# Patient Record
Sex: MALE | Race: BLACK OR AFRICAN AMERICAN | NOT HISPANIC OR LATINO | Employment: OTHER | URBAN - NONMETROPOLITAN AREA
[De-identification: names, ages, dates, MRNs, and addresses within clinical notes are randomized per-mention and may not be internally consistent; named-entity substitution may affect disease eponyms.]

---

## 2018-03-13 ENCOUNTER — OFFICE VISIT CONVERTED (OUTPATIENT)
Dept: FAMILY MEDICINE CLINIC | Age: 63
End: 2018-03-13
Attending: NURSE PRACTITIONER

## 2018-05-30 ENCOUNTER — OFFICE VISIT CONVERTED (OUTPATIENT)
Dept: FAMILY MEDICINE CLINIC | Age: 63
End: 2018-05-30
Attending: NURSE PRACTITIONER

## 2018-05-31 LAB
ALP SERPL-CCNC: 74 IU/L
ANA SER QL: NEGATIVE
ASO AB SERPL-ACNC: 73.5 IU/ML
CALCIUM SERPL-MCNC: 10.1 MG/DL
CRP SERPL-MCNC: 1.3 MG/L
PHOSPHATE SERPL-MCNC: 3.2 MG/DL
RA LATEX TURBID: <10 IU/ML
URATE SERPL-MCNC: 4.1 MG/DL

## 2018-06-12 ENCOUNTER — OFFICE VISIT CONVERTED (OUTPATIENT)
Dept: FAMILY MEDICINE CLINIC | Age: 63
End: 2018-06-12
Attending: NURSE PRACTITIONER

## 2018-06-19 ENCOUNTER — OFFICE VISIT CONVERTED (OUTPATIENT)
Dept: FAMILY MEDICINE CLINIC | Age: 63
End: 2018-06-19
Attending: NURSE PRACTITIONER

## 2018-09-04 ENCOUNTER — OFFICE VISIT CONVERTED (OUTPATIENT)
Dept: FAMILY MEDICINE CLINIC | Age: 63
End: 2018-09-04
Attending: NURSE PRACTITIONER

## 2018-12-14 ENCOUNTER — OFFICE VISIT (OUTPATIENT)
Dept: URBAN - METROPOLITAN AREA CLINIC 76 | Facility: CLINIC | Age: 63
End: 2018-12-14
Payer: COMMERCIAL

## 2018-12-14 PROCEDURE — 92020 GONIOSCOPY: CPT | Performed by: OPHTHALMOLOGY

## 2018-12-14 PROCEDURE — 99204 OFFICE O/P NEW MOD 45 MIN: CPT | Performed by: OPHTHALMOLOGY

## 2018-12-14 ASSESSMENT — INTRAOCULAR PRESSURE
OD: 10
OS: 12

## 2018-12-14 NOTE — IMPRESSION/PLAN
Impression: Open angle with borderline findings, high risk, bilateral: H40.023. OU. IOP good OU today. Reviewed previous records. Plan: Discussed condition. Continue Latanoprost QHS OU. Needs updated tests.

## 2019-02-06 ENCOUNTER — OFFICE VISIT (OUTPATIENT)
Dept: URBAN - METROPOLITAN AREA CLINIC 76 | Facility: CLINIC | Age: 64
End: 2019-02-06
Payer: COMMERCIAL

## 2019-02-06 PROCEDURE — 92083 EXTENDED VISUAL FIELD XM: CPT | Performed by: OPHTHALMOLOGY

## 2019-02-06 PROCEDURE — 76514 ECHO EXAM OF EYE THICKNESS: CPT | Performed by: OPHTHALMOLOGY

## 2019-02-06 PROCEDURE — 92133 CPTRZD OPH DX IMG PST SGM ON: CPT | Performed by: OPHTHALMOLOGY

## 2019-02-06 PROCEDURE — 99214 OFFICE O/P EST MOD 30 MIN: CPT | Performed by: OPHTHALMOLOGY

## 2019-02-06 RX ORDER — LATANOPROST 50 UG/ML
0.005 % SOLUTION OPHTHALMIC
Qty: 1 | Refills: 6 | Status: INACTIVE
Start: 2019-02-06 | End: 2019-10-21

## 2019-02-06 ASSESSMENT — INTRAOCULAR PRESSURE
OD: 15
OS: 16

## 2019-02-06 NOTE — IMPRESSION/PLAN
Impression: Open angle with borderline findings, high risk, bilateral: H40.023. OU. vs early OAG
IOP OU higher today, been off latanoprost QHS OU. 
VF and OCT reviewed today Target: Mid teens Plan: Discussed condition. Continue Latanoprost QHS OU. Advised medication compliance. 
will transfer care to Dr Theresa Singleton

## 2019-03-18 ENCOUNTER — HOSPITAL ENCOUNTER (OUTPATIENT)
Dept: OTHER | Facility: HOSPITAL | Age: 64
Discharge: HOME | End: 2019-03-18
Attending: NURSE PRACTITIONER

## 2019-03-18 ENCOUNTER — OFFICE VISIT CONVERTED (OUTPATIENT)
Dept: FAMILY MEDICINE CLINIC | Age: 64
End: 2019-03-18
Attending: NURSE PRACTITIONER

## 2019-03-18 LAB
ALBUMIN SERPL-MCNC: 4.3 G/DL (ref 3.5–5)
ALBUMIN/GLOB SERPL: 1.3 {RATIO} (ref 1.4–2.6)
ALP SERPL-CCNC: 69 U/L (ref 56–155)
ALT SERPL-CCNC: 20 U/L (ref 10–40)
ANION GAP SERPL CALC-SCNC: 21 MMOL/L (ref 8–19)
AST SERPL-CCNC: 20 U/L (ref 15–50)
BILIRUB SERPL-MCNC: 0.67 MG/DL (ref 0.2–1.3)
BUN SERPL-MCNC: 13 MG/DL (ref 5–25)
BUN/CREAT SERPL: 14 {RATIO} (ref 6–20)
CALCIUM SERPL-MCNC: 9.5 MG/DL (ref 8.7–10.4)
CHLORIDE SERPL-SCNC: 99 MMOL/L (ref 99–111)
CHOLEST SERPL-MCNC: 145 MG/DL (ref 107–200)
CHOLEST/HDLC SERPL: 2.7 {RATIO} (ref 3–6)
CONV CO2: 24 MMOL/L (ref 22–32)
CONV CREATININE URINE, RANDOM: 105.2 MG/DL (ref 10–300)
CONV MICROALBUM.,U,RANDOM: <12 MG/L (ref 0–20)
CONV TOTAL PROTEIN: 7.7 G/DL (ref 6.3–8.2)
CREAT UR-MCNC: 0.9 MG/DL (ref 0.7–1.2)
EST. AVERAGE GLUCOSE BLD GHB EST-MCNC: 171 MG/DL
GFR SERPLBLD BASED ON 1.73 SQ M-ARVRAT: >60 ML/MIN/{1.73_M2}
GLOBULIN UR ELPH-MCNC: 3.4 G/DL (ref 2–3.5)
GLUCOSE SERPL-MCNC: 179 MG/DL (ref 70–99)
HBA1C MFR BLD: 7.6 % (ref 3.5–5.7)
HDLC SERPL-MCNC: 54 MG/DL (ref 40–60)
LDLC SERPL CALC-MCNC: 62 MG/DL (ref 70–100)
MICROALBUMIN/CREAT UR: 11.4 MG/G{CRE} (ref 0–25)
OSMOLALITY SERPL CALC.SUM OF ELEC: 295 MOSM/KG (ref 273–304)
POTASSIUM SERPL-SCNC: 3.5 MMOL/L (ref 3.5–5.3)
PSA SERPL-MCNC: 0.46 NG/ML (ref 0–4)
SODIUM SERPL-SCNC: 140 MMOL/L (ref 135–147)
TRIGL SERPL-MCNC: 144 MG/DL (ref 40–150)
VLDLC SERPL-MCNC: 29 MG/DL (ref 5–37)

## 2019-04-13 ENCOUNTER — OFFICE VISIT CONVERTED (OUTPATIENT)
Dept: FAMILY MEDICINE CLINIC | Age: 64
End: 2019-04-13
Attending: FAMILY MEDICINE

## 2019-05-03 ENCOUNTER — HOSPITAL ENCOUNTER (OUTPATIENT)
Dept: OTHER | Facility: HOSPITAL | Age: 64
Discharge: HOME | End: 2019-05-03

## 2019-05-03 ENCOUNTER — OFFICE VISIT CONVERTED (OUTPATIENT)
Dept: FAMILY MEDICINE CLINIC | Age: 64
End: 2019-05-03
Attending: NURSE PRACTITIONER

## 2019-05-20 ENCOUNTER — OFFICE VISIT (OUTPATIENT)
Dept: URBAN - METROPOLITAN AREA CLINIC 71 | Facility: CLINIC | Age: 64
End: 2019-05-20
Payer: COMMERCIAL

## 2019-05-20 DIAGNOSIS — H02.051 TRICHIASIS WITHOUT ENTROPIAN RIGHT UPPER EYELID: ICD-10-CM

## 2019-05-20 PROCEDURE — 67820 REVISE EYELASHES: CPT | Performed by: OPTOMETRIST

## 2019-05-20 PROCEDURE — 99213 OFFICE O/P EST LOW 20 MIN: CPT | Performed by: OPTOMETRIST

## 2019-05-20 ASSESSMENT — INTRAOCULAR PRESSURE
OS: 11
OD: 12

## 2019-05-20 NOTE — IMPRESSION/PLAN
Impression: Open angle with borderline findings, high risk, bilateral: H40.023. OU. vs early OAG
IOP OU higher today, been off latanoprost QHS OU. Target: Mid teens Plan: Discussed condition. Continue Latanoprost QHS OU. Advised medication compliance.

## 2019-05-23 ENCOUNTER — OFFICE VISIT CONVERTED (OUTPATIENT)
Dept: FAMILY MEDICINE CLINIC | Age: 64
End: 2019-05-23
Attending: NURSE PRACTITIONER

## 2019-08-19 ENCOUNTER — HOSPITAL ENCOUNTER (OUTPATIENT)
Dept: OTHER | Facility: HOSPITAL | Age: 64
Discharge: HOME | End: 2019-08-19
Attending: NURSE PRACTITIONER

## 2019-08-19 ENCOUNTER — OFFICE VISIT CONVERTED (OUTPATIENT)
Dept: FAMILY MEDICINE CLINIC | Age: 64
End: 2019-08-19
Attending: NURSE PRACTITIONER

## 2019-08-19 ENCOUNTER — OFFICE VISIT (OUTPATIENT)
Dept: URBAN - METROPOLITAN AREA CLINIC 71 | Facility: CLINIC | Age: 64
End: 2019-08-19
Payer: COMMERCIAL

## 2019-08-19 DIAGNOSIS — H40.023 OPEN ANGLE WITH BORDERLINE FINDINGS, HIGH RISK, BILATERAL: Primary | ICD-10-CM

## 2019-08-19 LAB
ALBUMIN SERPL-MCNC: 4.5 G/DL (ref 3.5–5)
ALBUMIN/GLOB SERPL: 1.4 {RATIO} (ref 1.4–2.6)
ALP SERPL-CCNC: 73 U/L (ref 56–155)
ALT SERPL-CCNC: 18 U/L (ref 10–40)
ANION GAP SERPL CALC-SCNC: 20 MMOL/L (ref 8–19)
AST SERPL-CCNC: 17 U/L (ref 15–50)
BILIRUB SERPL-MCNC: 0.71 MG/DL (ref 0.2–1.3)
BUN SERPL-MCNC: 13 MG/DL (ref 5–25)
BUN/CREAT SERPL: 14 {RATIO} (ref 6–20)
CALCIUM SERPL-MCNC: 9.6 MG/DL (ref 8.7–10.4)
CHLORIDE SERPL-SCNC: 99 MMOL/L (ref 99–111)
CHOLEST SERPL-MCNC: 131 MG/DL (ref 107–200)
CHOLEST/HDLC SERPL: 2.5 {RATIO} (ref 3–6)
CONV CO2: 25 MMOL/L (ref 22–32)
CONV TOTAL PROTEIN: 7.7 G/DL (ref 6.3–8.2)
CREAT UR-MCNC: 0.93 MG/DL (ref 0.7–1.2)
EST. AVERAGE GLUCOSE BLD GHB EST-MCNC: 200 MG/DL
GFR SERPLBLD BASED ON 1.73 SQ M-ARVRAT: >60 ML/MIN/{1.73_M2}
GLOBULIN UR ELPH-MCNC: 3.2 G/DL (ref 2–3.5)
GLUCOSE SERPL-MCNC: 213 MG/DL (ref 70–99)
HBA1C MFR BLD: 8.6 % (ref 3.5–5.7)
HDLC SERPL-MCNC: 52 MG/DL (ref 40–60)
LDLC SERPL CALC-MCNC: 52 MG/DL (ref 70–100)
OSMOLALITY SERPL CALC.SUM OF ELEC: 296 MOSM/KG (ref 273–304)
POTASSIUM SERPL-SCNC: 3.9 MMOL/L (ref 3.5–5.3)
SODIUM SERPL-SCNC: 140 MMOL/L (ref 135–147)
TRIGL SERPL-MCNC: 137 MG/DL (ref 40–150)
VLDLC SERPL-MCNC: 27 MG/DL (ref 5–37)

## 2019-08-19 PROCEDURE — 99213 OFFICE O/P EST LOW 20 MIN: CPT | Performed by: OPTOMETRIST

## 2019-08-19 ASSESSMENT — INTRAOCULAR PRESSURE
OS: 12
OD: 13

## 2019-08-19 NOTE — IMPRESSION/PLAN
Impression: Open angle with borderline findings, high risk, bilateral: H40.023. OU. vs early OAG
IOP OU good today, been off latanoprost QHS OU. Target: Mid teens Plan: Discussed. Continue Latanoprost QHS OU. Advised medication compliance.  OCT/VF 2/2020

## 2019-09-10 ENCOUNTER — OFFICE VISIT CONVERTED (OUTPATIENT)
Dept: FAMILY MEDICINE CLINIC | Age: 64
End: 2019-09-10
Attending: NURSE PRACTITIONER

## 2019-11-22 ENCOUNTER — OFFICE VISIT (OUTPATIENT)
Dept: URBAN - METROPOLITAN AREA CLINIC 71 | Facility: CLINIC | Age: 64
End: 2019-11-22
Payer: COMMERCIAL

## 2019-11-22 DIAGNOSIS — H16.223 KERATOCONJUNCTIVITIS SICCA, NOT SPECIFIED AS SJÖGREN'S, BILATERAL: ICD-10-CM

## 2019-11-22 PROCEDURE — 99213 OFFICE O/P EST LOW 20 MIN: CPT | Performed by: OPTOMETRIST

## 2019-11-22 ASSESSMENT — INTRAOCULAR PRESSURE
OD: 12
OS: 13

## 2019-11-22 NOTE — IMPRESSION/PLAN
Impression: discussed. Open angle with borderline findings, high risk, bilateral: H40.023. OU. vs early OAG
IOP OU good today. Target: Mid teens Plan: Discussed. Continue Latanoprost QHS OU. Advised medication compliance.  OCT/VF 2/2020

## 2019-12-11 ENCOUNTER — OFFICE VISIT CONVERTED (OUTPATIENT)
Dept: FAMILY MEDICINE CLINIC | Age: 64
End: 2019-12-11
Attending: NURSE PRACTITIONER

## 2019-12-11 ENCOUNTER — HOSPITAL ENCOUNTER (OUTPATIENT)
Dept: OTHER | Facility: HOSPITAL | Age: 64
Discharge: HOME | End: 2019-12-11
Attending: NURSE PRACTITIONER

## 2019-12-11 LAB
ALBUMIN SERPL-MCNC: 4.6 G/DL (ref 3.5–5)
ALBUMIN/GLOB SERPL: 1.2 {RATIO} (ref 1.4–2.6)
ALP SERPL-CCNC: 63 U/L (ref 56–155)
ALT SERPL-CCNC: 13 U/L (ref 10–40)
ANION GAP SERPL CALC-SCNC: 18 MMOL/L (ref 8–19)
AST SERPL-CCNC: 20 U/L (ref 15–50)
BILIRUB SERPL-MCNC: 1.09 MG/DL (ref 0.2–1.3)
BUN SERPL-MCNC: 12 MG/DL (ref 5–25)
BUN/CREAT SERPL: 11 {RATIO} (ref 6–20)
CALCIUM SERPL-MCNC: 10.1 MG/DL (ref 8.7–10.4)
CHLORIDE SERPL-SCNC: 97 MMOL/L (ref 99–111)
CHOLEST SERPL-MCNC: 124 MG/DL (ref 107–200)
CHOLEST/HDLC SERPL: 3.2 {RATIO} (ref 3–6)
CONV CO2: 28 MMOL/L (ref 22–32)
CONV TOTAL PROTEIN: 8.3 G/DL (ref 6.3–8.2)
CREAT UR-MCNC: 1.14 MG/DL (ref 0.7–1.2)
EST. AVERAGE GLUCOSE BLD GHB EST-MCNC: 157 MG/DL
GFR SERPLBLD BASED ON 1.73 SQ M-ARVRAT: >60 ML/MIN/{1.73_M2}
GLOBULIN UR ELPH-MCNC: 3.7 G/DL (ref 2–3.5)
GLUCOSE SERPL-MCNC: 135 MG/DL (ref 70–99)
HBA1C MFR BLD: 7.1 % (ref 3.5–5.7)
HDLC SERPL-MCNC: 39 MG/DL (ref 40–60)
LDLC SERPL CALC-MCNC: 62 MG/DL (ref 70–100)
OSMOLALITY SERPL CALC.SUM OF ELEC: 292 MOSM/KG (ref 273–304)
POTASSIUM SERPL-SCNC: 3.2 MMOL/L (ref 3.5–5.3)
SODIUM SERPL-SCNC: 140 MMOL/L (ref 135–147)
TRIGL SERPL-MCNC: 116 MG/DL (ref 40–150)
VLDLC SERPL-MCNC: 23 MG/DL (ref 5–37)

## 2019-12-19 ENCOUNTER — HOSPITAL ENCOUNTER (OUTPATIENT)
Dept: OTHER | Facility: HOSPITAL | Age: 64
Discharge: HOME | End: 2019-12-19
Attending: NURSE PRACTITIONER

## 2019-12-19 LAB
ANION GAP SERPL CALC-SCNC: 16 MMOL/L (ref 8–19)
BUN SERPL-MCNC: 10 MG/DL (ref 5–25)
BUN/CREAT SERPL: 10 {RATIO} (ref 6–20)
CALCIUM SERPL-MCNC: 10.1 MG/DL (ref 8.7–10.4)
CHLORIDE SERPL-SCNC: 98 MMOL/L (ref 99–111)
CONV CO2: 30 MMOL/L (ref 22–32)
CREAT UR-MCNC: 0.97 MG/DL (ref 0.7–1.2)
GFR SERPLBLD BASED ON 1.73 SQ M-ARVRAT: >60 ML/MIN/{1.73_M2}
GLUCOSE SERPL-MCNC: 113 MG/DL (ref 70–99)
OSMOLALITY SERPL CALC.SUM OF ELEC: 292 MOSM/KG (ref 273–304)
POTASSIUM SERPL-SCNC: 3.3 MMOL/L (ref 3.5–5.3)
SODIUM SERPL-SCNC: 141 MMOL/L (ref 135–147)

## 2020-04-29 ENCOUNTER — OFFICE VISIT CONVERTED (OUTPATIENT)
Dept: FAMILY MEDICINE CLINIC | Age: 65
End: 2020-04-29
Attending: NURSE PRACTITIONER

## 2020-05-07 ENCOUNTER — HOSPITAL ENCOUNTER (OUTPATIENT)
Dept: OTHER | Facility: HOSPITAL | Age: 65
Discharge: HOME | End: 2020-05-07
Attending: NURSE PRACTITIONER

## 2020-05-07 LAB
ALBUMIN SERPL-MCNC: 4.3 G/DL (ref 3.5–5)
ALBUMIN/GLOB SERPL: 1.2 {RATIO} (ref 1.4–2.6)
ALP SERPL-CCNC: 63 U/L (ref 56–155)
ALT SERPL-CCNC: 14 U/L (ref 10–40)
ANION GAP SERPL CALC-SCNC: 17 MMOL/L (ref 8–19)
AST SERPL-CCNC: 16 U/L (ref 15–50)
BILIRUB SERPL-MCNC: 0.54 MG/DL (ref 0.2–1.3)
BUN SERPL-MCNC: 9 MG/DL (ref 5–25)
BUN/CREAT SERPL: 9 {RATIO} (ref 6–20)
CALCIUM SERPL-MCNC: 10.2 MG/DL (ref 8.7–10.4)
CHLORIDE SERPL-SCNC: 99 MMOL/L (ref 99–111)
CHOLEST SERPL-MCNC: 109 MG/DL (ref 107–200)
CHOLEST/HDLC SERPL: 2.2 {RATIO} (ref 3–6)
CONV CO2: 27 MMOL/L (ref 22–32)
CONV CREATININE URINE, RANDOM: 181.7 MG/DL (ref 10–300)
CONV MICROALBUM.,U,RANDOM: <12 MG/L (ref 0–20)
CONV TOTAL PROTEIN: 7.8 G/DL (ref 6.3–8.2)
CREAT UR-MCNC: 1.04 MG/DL (ref 0.7–1.2)
EST. AVERAGE GLUCOSE BLD GHB EST-MCNC: 151 MG/DL
GFR SERPLBLD BASED ON 1.73 SQ M-ARVRAT: >60 ML/MIN/{1.73_M2}
GLOBULIN UR ELPH-MCNC: 3.5 G/DL (ref 2–3.5)
GLUCOSE SERPL-MCNC: 138 MG/DL (ref 70–99)
HBA1C MFR BLD: 6.9 % (ref 3.5–5.7)
HDLC SERPL-MCNC: 49 MG/DL (ref 40–60)
LDLC SERPL CALC-MCNC: 44 MG/DL (ref 70–100)
MICROALBUMIN/CREAT UR: 6.6 MG/G{CRE} (ref 0–25)
OSMOLALITY SERPL CALC.SUM OF ELEC: 289 MOSM/KG (ref 273–304)
POTASSIUM SERPL-SCNC: 4.1 MMOL/L (ref 3.5–5.3)
SODIUM SERPL-SCNC: 139 MMOL/L (ref 135–147)
TRIGL SERPL-MCNC: 79 MG/DL (ref 40–150)
VLDLC SERPL-MCNC: 16 MG/DL (ref 5–37)

## 2020-11-10 ENCOUNTER — HOSPITAL ENCOUNTER (OUTPATIENT)
Dept: OTHER | Facility: HOSPITAL | Age: 65
Discharge: HOME | End: 2020-11-10
Attending: NURSE PRACTITIONER

## 2020-11-10 ENCOUNTER — OFFICE VISIT CONVERTED (OUTPATIENT)
Dept: FAMILY MEDICINE CLINIC | Age: 65
End: 2020-11-10
Attending: NURSE PRACTITIONER

## 2020-11-10 LAB
ALBUMIN SERPL-MCNC: 4.4 G/DL (ref 3.5–5)
ALBUMIN/GLOB SERPL: 1.5 {RATIO} (ref 1.4–2.6)
ALP SERPL-CCNC: 73 U/L (ref 56–155)
ALT SERPL-CCNC: 18 U/L (ref 10–40)
ANION GAP SERPL CALC-SCNC: 17 MMOL/L (ref 8–19)
AST SERPL-CCNC: 19 U/L (ref 15–50)
BILIRUB SERPL-MCNC: 0.73 MG/DL (ref 0.2–1.3)
BUN SERPL-MCNC: 14 MG/DL (ref 5–25)
BUN/CREAT SERPL: 13 {RATIO} (ref 6–20)
CALCIUM SERPL-MCNC: 10 MG/DL (ref 8.7–10.4)
CHLORIDE SERPL-SCNC: 99 MMOL/L (ref 99–111)
CHOLEST SERPL-MCNC: 127 MG/DL (ref 107–200)
CHOLEST/HDLC SERPL: 2.8 {RATIO} (ref 3–6)
CONV CO2: 26 MMOL/L (ref 22–32)
CONV TOTAL PROTEIN: 7.3 G/DL (ref 6.3–8.2)
CREAT UR-MCNC: 1.1 MG/DL (ref 0.7–1.2)
EST. AVERAGE GLUCOSE BLD GHB EST-MCNC: 275 MG/DL
GFR SERPLBLD BASED ON 1.73 SQ M-ARVRAT: >60 ML/MIN/{1.73_M2}
GLOBULIN UR ELPH-MCNC: 2.9 G/DL (ref 2–3.5)
GLUCOSE SERPL-MCNC: 235 MG/DL (ref 70–99)
HBA1C MFR BLD: 11.2 % (ref 3.5–5.7)
HDLC SERPL-MCNC: 45 MG/DL (ref 40–60)
LDLC SERPL CALC-MCNC: 52 MG/DL (ref 70–100)
OSMOLALITY SERPL CALC.SUM OF ELEC: 294 MOSM/KG (ref 273–304)
POTASSIUM SERPL-SCNC: 3.8 MMOL/L (ref 3.5–5.3)
SODIUM SERPL-SCNC: 138 MMOL/L (ref 135–147)
TRIGL SERPL-MCNC: 148 MG/DL (ref 40–150)
VLDLC SERPL-MCNC: 30 MG/DL (ref 5–37)

## 2021-02-01 ENCOUNTER — OFFICE VISIT (OUTPATIENT)
Dept: URBAN - METROPOLITAN AREA CLINIC 71 | Facility: CLINIC | Age: 66
End: 2021-02-01
Payer: COMMERCIAL

## 2021-02-01 DIAGNOSIS — H43.813 VITREOUS DEGENERATION, BILATERAL: ICD-10-CM

## 2021-02-01 PROCEDURE — 92014 COMPRE OPH EXAM EST PT 1/>: CPT | Performed by: OPTOMETRIST

## 2021-02-01 PROCEDURE — 92133 CPTRZD OPH DX IMG PST SGM ON: CPT | Performed by: OPTOMETRIST

## 2021-02-01 RX ORDER — LATANOPROST 50 UG/ML
0.005 % SOLUTION OPHTHALMIC
Qty: 2.5 | Refills: 2 | Status: INACTIVE
Start: 2021-02-01 | End: 2021-05-17

## 2021-02-01 ASSESSMENT — INTRAOCULAR PRESSURE
OS: 13
OD: 12

## 2021-02-01 NOTE — IMPRESSION/PLAN
Impression: discussed. Open angle with borderline findings, high risk, bilateral: H40.023. OU. vs early OAG. OCT 02/01/21: maybe worse superiorly OU. IOP OU good today. Target: Mid teens. Pt did not return for testing as advised (COVID) Plan: Discussed. Continue Latanoprost QHS OU. Next OCT due 02/2022. VF 24-2 next available.

## 2021-03-24 ENCOUNTER — HOSPITAL ENCOUNTER (OUTPATIENT)
Dept: OTHER | Facility: HOSPITAL | Age: 66
Discharge: HOME | End: 2021-03-24
Attending: NURSE PRACTITIONER

## 2021-03-24 ENCOUNTER — OFFICE VISIT CONVERTED (OUTPATIENT)
Dept: FAMILY MEDICINE CLINIC | Age: 66
End: 2021-03-24
Attending: NURSE PRACTITIONER

## 2021-03-24 LAB
ALBUMIN SERPL-MCNC: 4.4 G/DL (ref 3.5–5)
ALBUMIN/GLOB SERPL: 1.4 {RATIO} (ref 1.4–2.6)
ALP SERPL-CCNC: 73 U/L (ref 56–155)
ALT SERPL-CCNC: 22 U/L (ref 10–40)
ANION GAP SERPL CALC-SCNC: 22 MMOL/L (ref 8–19)
AST SERPL-CCNC: 19 U/L (ref 15–50)
BILIRUB SERPL-MCNC: 0.52 MG/DL (ref 0.2–1.3)
BUN SERPL-MCNC: 12 MG/DL (ref 5–25)
BUN/CREAT SERPL: 11 {RATIO} (ref 6–20)
CALCIUM SERPL-MCNC: 9.9 MG/DL (ref 8.7–10.4)
CHLORIDE SERPL-SCNC: 97 MMOL/L (ref 99–111)
CHOLEST SERPL-MCNC: 122 MG/DL (ref 107–200)
CHOLEST/HDLC SERPL: 3.1 {RATIO} (ref 3–6)
CONV CO2: 24 MMOL/L (ref 22–32)
CONV TOTAL PROTEIN: 7.5 G/DL (ref 6.3–8.2)
CREAT UR-MCNC: 1.06 MG/DL (ref 0.7–1.2)
EST. AVERAGE GLUCOSE BLD GHB EST-MCNC: 283 MG/DL
GFR SERPLBLD BASED ON 1.73 SQ M-ARVRAT: >60 ML/MIN/{1.73_M2}
GLOBULIN UR ELPH-MCNC: 3.1 G/DL (ref 2–3.5)
GLUCOSE SERPL-MCNC: 287 MG/DL (ref 70–99)
HBA1C MFR BLD: 11.5 % (ref 3.5–5.7)
HDLC SERPL-MCNC: 40 MG/DL (ref 40–60)
LDLC SERPL CALC-MCNC: 49 MG/DL (ref 70–100)
OSMOLALITY SERPL CALC.SUM OF ELEC: 298 MOSM/KG (ref 273–304)
POTASSIUM SERPL-SCNC: 4 MMOL/L (ref 3.5–5.3)
PSA SERPL-MCNC: 0.4 NG/ML (ref 0–4)
SODIUM SERPL-SCNC: 139 MMOL/L (ref 135–147)
TRIGL SERPL-MCNC: 163 MG/DL (ref 40–150)
VLDLC SERPL-MCNC: 33 MG/DL (ref 5–37)

## 2021-03-26 ENCOUNTER — OFFICE VISIT (OUTPATIENT)
Dept: URBAN - METROPOLITAN AREA CLINIC 71 | Facility: CLINIC | Age: 66
End: 2021-03-26
Payer: COMMERCIAL

## 2021-03-26 PROCEDURE — 99213 OFFICE O/P EST LOW 20 MIN: CPT | Performed by: OPTOMETRIST

## 2021-03-26 PROCEDURE — 92083 EXTENDED VISUAL FIELD XM: CPT | Performed by: OPTOMETRIST

## 2021-03-26 ASSESSMENT — INTRAOCULAR PRESSURE
OS: 11
OD: 18
OS: 17
OD: 13

## 2021-03-26 NOTE — IMPRESSION/PLAN
Impression: discussed. Open angle with borderline findings, high risk, bilateral: H40.023. OU. vs early OAG. OCT 02/01/21: maybe worse superiorly OU. VF 03/26/21: WNL/stable OU. IOP OU good today. Target: Mid teens. Plan: Discussed. Continue Latanoprost QHS OU. Next VF/OCT due 03/2022.

## 2021-04-02 ENCOUNTER — HOSPITAL ENCOUNTER (OUTPATIENT)
Dept: OTHER | Facility: HOSPITAL | Age: 66
Discharge: HOME | End: 2021-04-02
Attending: NURSE PRACTITIONER

## 2021-05-18 NOTE — PROGRESS NOTES
Maksim Acosta 1955     Office/Outpatient Visit    Visit Date: Tue, Jun 19, 2018 11:57 am    Provider: Yolanda Marie N.P. (Assistant: Velma Govea MA)    Location: [facility]        Electronically signed by Yolanda Marie N.P. on  06/19/2018 12:48:31 PM                             SUBJECTIVE:        CC:     Mr. Acosta is a 62 year old Black or  male.  Check up going to  and has a CT of his head Friday and derm appt on june 28         HPI:         Mr. Acosta presents with type 2 diabetes.  Current meds include an oral hypoglycemic ( Glucophage ( 1000mg bid ) and januvia ).  He reports home blood glucose readings have been fairly good, with average fasting glucoses running in the 120-150 mg/dL range.  Most recent lab results include HDL:  50 (mg/dL) (03/13/2018), Triglycerides:  62 (mg/dL) (11/13/2017),  162 (mg/dL) (03/13/2018), LDL:  50 (mg/dL) (03/13/2018), Hemoglobin A1c:  6.7 (%) (11/13/2017), Microalbuminuria:  6.6 (mg/g creat) (11/13/2017), Dilated Eye Exam by date:  02/23/2018 (03/13/2018), Foot Exam (Annual):  11/13/2017 (11/13/2017).          Concerning hyperlipidemia, current treatment includes Lipitor.  Compliance with treatment has been good; he takes his medication as directed.  He denies experiencing any hypercholesterolemia related symptoms.          Dx with hypertension; his current cardiac medication regimen includes a calcium channel blocker, an adrenergic inhibitor ( Catapres ), and a combination medication ( Zestoretic ).      ROS:     CONSTITUTIONAL:  Negative for chills, fatigue, fever, and weight change.      CARDIOVASCULAR:  Negative for chest pain, palpitations, tachycardia, orthopnea, and edema.      RESPIRATORY:  Negative for cough, dyspnea, and hemoptysis.      NEUROLOGICAL:  Negative for dizziness, headaches, paresthesias, and weakness.          PMH/FMH/SH:     Last Reviewed on 6/19/2018 12:08 PM by Yolanda Marie    Past Medical History:          Hyperlipidemia    Hypertension     Sleep Apnea: (+) sleep study; uses CPAP;     Chronic Pain: affecting the low back;     Type 2 Diabetes: dx'd in ;     Glaucoma: dx'd in ;         PAST MEDICAL HISTORY                 ADVANCED DIRECTIVES: None         PREVENTIVE HEALTH MAINTENANCE             COLORECTAL CANCER SCREENING:; 2014         Surgical History:         Procedures:    Colonoscopy ( 3-/Dany (has had polyps)due follow up in 2019 )         Family History:     Father:  at age 83;  Dementia;  Type 2 Diabetes     Mother:  at age 51; Cause of death was CVA     Brother(s): 3 brother(s) total; 2 ;  Myocardial Infarction (  71 );  cancer of pharynx     Sister(s): 2 sister(s) total; 2 ;   at birth         Social History:     Occupation: Disabled (due to low back pain)     Marital Status:      Children: 2 children         Tobacco/Alcohol/Supplements:     Last Reviewed on 2018 12:00 PM by eVlma Govea    Tobacco: He has never smoked.  Non-drinker             Immunizations:     Fluarix pf 9/10/2013     Fluarix pf 2015     Fluvirin (4 + years dose) 2016     Fluvirin (4 + years dose) 2017     Fluzone (3 + years dose) 9/10/2012     Fluzone pf (3+ years dose) 10/13/2011     PNEUMOVAX 23 (Pneumococcal PPV23) 10/13/2011     Zostavax (Zoster) 2015         Allergies:     Last Reviewed on 2018 12:00 PM by Velma Govea      No Known Drug Allergies.         Current Medications:     Last Reviewed on 2018 12:01 PM by Velma Govea    FreeStyle Lancets  Lancet Check BS 1time daily DX :E11.9     Glucose Reagent Blood Test Strips  Reagent Strips Freestyle Lite check BS QD     Trazodone HCl 100mg Tablet 1 tab HS     Amlodipine  10mg Tablet 1 tab daily     Clonidine HCl 0.2mg Tablets Take 1 tablet(s) by mouth bid     Januvia 100mg Tablet Take 1 tablet(s) by mouth daily     Lisinopril/Hydrochlorothiazide 20mg/12.5mg Tablet 1 bid      Metformin HCl 1,000mg Tablet One PO BID.     Potassium Chloride 10mEq Tablets, Extended Release Take 1 tablet(s) by mouth daily     Keflex 500mg Capsules Take 1 capsule 2 times daily x 10 days     Mobic 15mg Tablet 1 tab daily     Hydroxyzine HCl 25mg Tablet Take 1 tablet(s) by mouth tid PRN itching     Combigan 0.2%/0.5% Ophthalmic Solution 1 drop each eye BID     CPAP machine         OBJECTIVE:        Vitals:         Current: 6/19/2018 12:00:23 PM    Ht:  5 ft, 11 in;  Wt: 264 lbs;  BMI: 36.8    T: 97 F (oral);  BP: 139/97 mm Hg (right arm, sitting);  P: 81 bpm (right arm (BP Cuff), sitting);  sCr: 0.94 mg/dL;  GFR: 93.98        Repeat:     12:29:32 PM     BP:   136/88mm Hg (right arm, sitting)         Exams:     PHYSICAL EXAM:     GENERAL: Vitals recorded well developed, well nourished;  well groomed;  no apparent distress;     NECK: carotid exam reveals no bruits;     RESPIRATORY: normal respiratory rate and pattern with no distress; normal breath sounds with no rales, rhonchi, wheezes or rubs;     CARDIOVASCULAR: normal rate; rhythm is regular;  no systolic murmur; no edema;     PSYCHIATRIC:  appropriate affect and demeanor; normal speech pattern; grossly normal memory;         Procedures:     Vaccination against viral hepatitis     1. Hepatitis A (adult): 1.0 ml given IM in the left upper arm; administered by kh             ASSESSMENT           250.00   E11.9  Type 2 diabetes              DDx:     272.4   E78.2  Hyperlipidemia              DDx:     401.1   I10  Hypertension              DDx:     V05.3   Z23  Vaccination against viral hepatitis              DDx:         ORDERS:         Meds Prescribed:       Refill of: Januvia (Sitagliptin Phosphate) 100mg Tablet Take 1 tablet(s) by mouth daily  #90 (Ninety) tablet(s) Refills: 1       Refill of: Lisinopril/Hydrochlorothiazide 20mg/12.5mg Tablet 1 bid  #180 (One [place] and Eighty) tablet(s) Refills: 1       Refill of: Metformin HCl 1,000mg Tablet One PO BID.   #180 (One [place] and Eighty) tablet(s) Refills: 1       Refill of: Lipitor (Atorvastatin Calcium)  20mg Tablet 1 tab daily  #90 (Ninety) tablet(s) Refills: 1       Refill of: Amlodipine  10mg Tablet 1 tab daily  #90 (Ninety) tablet(s) Refills: 1       Refill of: Clonidine HCl 0.2mg Tablets Take 1 tablet(s) by mouth bid  #180 (One [place] and [place]y) tablet(s) Refills: 1       Refill of: Potassium Chloride 10mEq Tablets, Extended Release Take 1 tablet(s) by mouth daily  #90 (Ninety) tablet(s) Refills: 1         Lab Orders:       [id number]  DIAB - [facility] LIPID,CMP, A1C: 67371, 91313, 81447  (Send-Out)           Procedures Ordered:       [id number]  Hepatitis A vaccine, adult dosage, for intramuscular use  (In-House)         68250  Immunization administration; one vaccine  (In-House)                   PLAN:          Type 2 diabetes reviewed diabetes flow sheet, send for last eye exam done by Dr. Bowling     LABORATORY:  Labs ordered to be performed today include Diabetes Panel 1; CMP, Lipid, A1C.            Prescriptions:       Refill of: Januvia (Sitagliptin Phosphate) 100mg Tablet Take 1 tablet(s) by mouth daily  #90 (Ninety) tablet(s) Refills: 1       Refill of: Metformin HCl 1,000mg Tablet One PO BID.  #180 (One [place] and [place]y) tablet(s) Refills: 1           Orders:       [id number]  DIAB - [facility] LIPID,CMP, A1C: 01828, 63295, 32442  (Send-Out)            Hyperlipidemia           Prescriptions:       Refill of: Lipitor (Atorvastatin Calcium)  20mg Tablet 1 tab daily  #90 (Ninety) tablet(s) Refills: 1          Hypertension         RECOMMENDATIONS given include: perform routine monitoring of blood pressure with home blood pressure cuff and reduction of dietary salt intake.      FOLLOW-UP: Schedule a follow-up visit in 6 months.            Prescriptions:       Refill of: Lisinopril/Hydrochlorothiazide 20mg/12.5mg Tablet 1 bid  #180 (One [place] and [place]y) tablet(s) Refills: 1       Refill of: Amlodipine  10mg Tablet 1 tab daily   #90 (Ninety) tablet(s) Refills: 1       Refill of: Clonidine HCl 0.2mg Tablets Take 1 tablet(s) by mouth bid  #180 (One [place] and Eighty) tablet(s) Refills: 1       Refill of: Potassium Chloride 10mEq Tablets, Extended Release Take 1 tablet(s) by mouth daily  #90 (Ninety) tablet(s) Refills: 1          Vaccination against viral hepatitis             FOLLOW-UP: 6 months for second hep a vaccine           Orders:       [id number]  Hepatitis A vaccine, adult dosage, for intramuscular use  (In-House)         38841  Immunization administration; one vaccine  (In-House)             Patient Education Handouts:       Hepatitis A              Patient Recommendations:        For  Hypertension:     Begin monitoring your blood pressure by brief nurse visits at our office, a home blood pressure monitor, or by checking on the machines in pharmacies or stores.  Keep a log of the readings. Reduce the amount of salt in your diet.  Schedule a follow-up visit in 6 months.          For  Vaccination against viral hepatitis:                 FOLLOW-UP:             CHARGE CAPTURE           **Please note: ICD descriptions below are intended for billing purposes only and may not represent clinical diagnoses**        Primary Diagnosis:         250.00 Type 2 diabetes            E11.9    Type 2 diabetes mellitus without complications              Orders:          [id number]   Office/outpatient visit; established patient, level 4  (In-House)           272.4 Hyperlipidemia            E78.2    Mixed hyperlipidemia    401.1 Hypertension            I10    Essential (primary) hypertension    V05.3 Vaccination against viral hepatitis            Z23    Encounter for immunization              Orders:          [id number]   Hepatitis A vaccine, adult dosage, for intramuscular use  (In-House)             28824   Immunization administration; one vaccine  (In-House)

## 2021-05-18 NOTE — PROGRESS NOTES
Maksim Acosta 1955     Office/Outpatient Visit    Visit Date: Fri, May 3, 2019 01:58 pm    Provider: Obdulia Hoyos N.P. (Assistant: Melissa Cade MA)    Location: [facility]        Electronically signed by Obdulia Hoyos N.P. on  05/06/2019 06:59:58 AM                             SUBJECTIVE:        CC:     Mr. Acosta is a 63 year old Black or  male.  presents today due to complaints of cough and congestion X 1 month         HPI:         Patient complains of cough.  Pt states productive cough with brown musous. States blowing nose with clear mucous. Headaches, sore throat, feels he is coughing so much his lung hurt. Drinking water, using cough drops, robitussin with no relief.  He was seen 2 weeks ago with meds not helping at all.     ROS:     CONSTITUTIONAL:  Negative for chills, fatigue and fever.      CARDIOVASCULAR:  Negative for chest pain, orthopnea, paroxysmal nocturnal dyspnea and pedal edema.      RESPIRATORY:  Positive for recent cough.   Negative for dyspnea or cough.      GASTROINTESTINAL:  Negative for abdominal pain, heartburn, constipation, diarrhea, and stool changes.      MUSCULOSKELETAL:  Negative for arthralgias, back pain, and myalgias.      NEUROLOGICAL:  Negative for dizziness, headaches, paresthesias, and weakness.      PSYCHIATRIC:  Negative for anxiety and depression.          PMH/FMH/SH:     Last Reviewed on 5/03/2019 02:19 PM by Obdulia Hoyos    Past Medical History:         Hyperlipidemia    Hypertension     Sleep Apnea: (+) sleep study; uses CPAP;     Chronic Pain: affecting the low back;     Type 2 Diabetes: dx'd in 2010;     Glaucoma: dx'd in 2009;         PAST MEDICAL HISTORY                 ADVANCED DIRECTIVES: None         PREVENTIVE HEALTH MAINTENANCE             COLORECTAL CANCER SCREENING:; 03-, pt. has consultation scheduled in May     Hepatitis C Medicare Screening: was last done 5-16-17; negative     PSA: was last done  2018 with normal results         PAST MEDICAL HISTORY             CURRENT MEDICAL PROVIDERS:    Dentist: Dr. Jacobo    Ophthalmologist: Dr. Bowling    Urologist: Dr. Wiley         Surgical History:         Procedures:    Colonoscopy ( 3-/Dayn (has had polyps)due follow up in 2019 )         Family History:     Father:  at age 83;  Dementia;  Type 2 Diabetes     Mother:  at age 51; Cause of death was CVA     Brother(s): 3 brother(s) total; 2 ;  Myocardial Infarction (  71 );  cancer of pharynx     Sister(s): 2 sister(s) total; 2 ;   at birth     Daughter(s): 2 daughter(s) total         Social History:     Occupation: Disabled (due to low back pain)     Marital Status:      Children: 2 children         Tobacco/Alcohol/Supplements:     Last Reviewed on 2019 02:19 PM by Obdulia Hoyos    Tobacco: He has never smoked.  Non-drinker         Substance Abuse History:     Last Reviewed on 2019 02:19 PM by Obdulia Hoyos        Marijuana: Current use. couple times  a month         Mental Health History:     Last Reviewed on 2019 02:19 PM by Obdulia Hoyos        Communicable Diseases (eg STDs):     Last Reviewed on 2019 02:19 PM by Obdulia Hoyos            Current Problems:     Last Reviewed on 2019 02:19 PM by Obdulia Hoyos    History of colonic polyps     Joint pain, other specified site     Memory loss NOS     Psoriasis     Acanthosis nigricans     Low back pain     Hyperlipidemia     Hypertension     Type 2 diabetes     Cough     Tinea cruris     URI     Screening for prostate cancer     Screening for depression     Insomnia         Immunizations:     Havrix -adult dose (HepA) 2018     Hep A, adult dose 3/19/2019     Fluarix pf 9/10/2013     Fluarix pf 2015     Fluvirin (4 + years dose) 2016     Fluvirin (4 + years dose) 2017     Fluzone (3 + years dose) 9/10/2012     Fluzone (3 + years dose) 2018      Fluzone pf (3+ years dose) 10/13/2011     PNEUMOVAX 23 (Pneumococcal PPV23) 10/13/2011     Adacel (Tdap) 3/19/2019     Shingrix (Zoster recombinant) 3/19/2019     Zostavax (Zoster live) 9/19/2015         Allergies:     Last Reviewed on 5/03/2019 02:19 PM by Obdulia Hoyos      No Known Drug Allergies.         Current Medications:     Last Reviewed on 5/03/2019 02:19 PM by Obdulia Hoyos    Clonidine HCl 0.2mg Tablets Take 1 tablet(s) by mouth bid     Ventolin HFA 90mcg/1actuation Oral Inhaler Inhale 2 puff(s) by mouth 4 times a day as needed     Atorvastatin Calcium 20mg Tablet 1 tab daily     Trazodone HCl 100mg Tablet 1 tab HS     FreeStyle Lancets  Lancet Check BS 1time daily DX :E11.9     Glucose Reagent Blood Test Strips  Reagent Strips Freestyle Lite check BS QD     Amlodipine  10mg Tablet 1 tab daily     Januvia 100mg Tablet Take 1 tablet(s) by mouth daily     Lisinopril/Hydrochlorothiazide 20mg/12.5mg Tablet 1 bid     Metformin HCl 1,000mg Tablet One PO BID.     Potassium Chloride 10mEq Tablets, Extended Release Take 1 tablet(s) by mouth daily     Mobic 15mg Tablet 1 tab daily     Combigan 0.2%/0.5% Ophthalmic Solution 1 drop each eye BID     CPAP machine         OBJECTIVE:        Vitals:         Current: 5/3/2019 2:03:12 PM    Ht:  5 ft, 11 in;  Wt: 251.4 lbs;  BMI: 35.1    T: 98.2 F (oral);  BP: 120/79 mm Hg (right arm, sitting);  P: 90 bpm (right arm (BP Cuff), sitting);  sCr: 0.9 mg/dL;  GFR: 94.94        Exams:     PHYSICAL EXAM:     GENERAL: Vitals recorded well developed, well nourished;  well groomed;  no apparent distress;     EYES: lids and lacrimal system are normal in appearance; extraocular movements intact; conjunctiva and cornea are normal; PERRLA;     E/N/T:  normal EACs, TMs, nasal/oral mucosa, teeth, gingiva, and oropharynx;     NECK:  supple, full ROM; no thyromegaly; no carotid bruits;     RESPIRATORY: normal respiratory rate and pattern with no distress; decreased breath sounds  throughout;     CARDIOVASCULAR: normal rate; rhythm is regular;  normal S1; normal S2; no systolic murmur; no cyanosis; no edema;     GASTROINTESTINAL: nontender, nondistended; no hepatosplenomegaly or masses; no bruits;     SKIN:  no significant rashes or lesions; no suspicious moles;     MUSCULOSKELETAL:  Normal range of motion, strength and tone;     NEUROLOGICAL:  cranial nerves, motor and sensory function, reflexes, gait and coordination are all intact;     PSYCHIATRIC:  appropriate affect and demeanor; normal speech pattern; grossly normal memory;         ASSESSMENT:           466.0   J20.8  Acute bronchitis              DDx:         ORDERS:         Meds Prescribed:       Levaquin (Levofloxacin ) 500mg Tablet Take 1 tablet(s) by mouth daily for 10 days  #10 (Ten) tablet(s) Refills: 0       Prednisone 20mg Tablet 2 PO daily x 5 days  #10 (Ten) tablet(s) Refills: 0         Radiology/Test Orders:       [id number]  Radiologic exam chest 2 views  (Send-Out)                   PLAN:          Acute bronchitis Monitor BS as discussed.         RADIOLOGY:  I have ordered a chest x-ray (PA and lateral) to be done today.            Prescriptions:       Levaquin (Levofloxacin ) 500mg Tablet Take 1 tablet(s) by mouth daily for 10 days  #10 (Ten) tablet(s) Refills: 0       Prednisone 20mg Tablet 2 PO daily x 5 days  #10 (Ten) tablet(s) Refills: 0           Orders:       [id number]  Radiologic exam chest 2 views  (Send-Out)               CHARGE CAPTURE:           Primary Diagnosis:     466.0 Acute bronchitis            J20.8    Acute bronchitis due to other specified organisms              Orders:          [id number]   Office/outpatient visit; established patient, level 3  (In-House)

## 2021-05-18 NOTE — PROGRESS NOTES
Maksim Acosta 1955     Office/Outpatient Visit    Visit Date: Mon, Mar 18, 2019 08:36 am    Provider: Yolanda Marie N.P. (Assistant: Cathy Ramirez LPN)    Location: [facility]        Electronically signed by Yolanda Marie N.P. on  03/18/2019 10:10:52 AM                             SUBJECTIVE:        CC:     Mr. Acosta is a 63 year old Black or  male.  He is here for Medicare wellness.          HPI:         Mr. Acosta is here for a Medicare wellness visit.  ADVANCED DIRECTIVES: None     Returning to health checkup, the required HRA questions are integrated within this visit note. Family medical history and individual medical/surgical history were reviewed and updated.  A current height, weight, BMI, blood pressure, and pulse were recorded in the vitals section of the note and have been reviewed. Patient's medications, including supplements, were recorded in the chart and reviewed.  Current providers and suppliers were reviewed and updated.          Self-Assessment of Health: He rates his health as good. He rates his confidence of being able to control/manage most of his health problems as very confident. His physical/emotional health has limited his social activites not at all.  A review of cognitive impairment was performed, including ability to drive a car, manage finances, and any memory changes, and was found to be negative.  A review of functional ability, including bathing, dressing, walking, and urine/bowel continence as well as level of safety was performed and was found to be negative.  Falls Risk: Has not had any falls or only one fall without injury in the past year.  He denies having trouble hearing the TV/radio when others do not, having to strain to hear or understand conversations and wearing hearing aid(s).  Concerning home safety, he reports that at home he DOES have adequate lighting, grab bars in the bath, handrails on stairs and functioning smoke alarms,  but not a skid resistant shower/tub or absence of throw rugs.  Physical Activity: He exercises for at least 20 minutes 3 or more days/week.; Type of diet patient normally eats is described as well-balanced with fruits and vegetables Tobacco: He has never smoked.  Preventative Health updated today.          PHQ-9 Depression Screening: Completed form scanned and in chart; Total Score 6 Alcohol Consumption Screening: Completed form scanned and in chart; Total Score 0         Concerning type 2 diabetes, current meds include an oral hypoglycemic ( Glucophage ( 1000mg bid ) and januvia ).  He reports home blood glucose readings have averaged fasting readings in the 140-150's mg/dL range.  Most recent lab results include Hemoglobin A1c:  8.1 (09/28/2018), LDL:  58 (mg/dL) (06/19/2018), HDL:  48 (mg/dL) (06/19/2018), Triglycerides:  122 (mg/dL) (06/19/2018), Microalbuminuria:  6.6 (mg/g creat) (11/13/2017), Dilated Eye Exam by date:  02/23/2018 (03/13/2018), Foot Exam (Annual):  09/04/2018 (09/04/2018).      ROS:     CONSTITUTIONAL:  Negative for chills and fever.      EYES:  Negative for blurred vision.      E/N/T:  Negative for nasal congestion and sore throat.      CARDIOVASCULAR:  Negative for chest pain and palpitations.      RESPIRATORY:  Negative for recent cough and dyspnea.      GASTROINTESTINAL:  Negative for abdominal pain, nausea and vomiting.      MUSCULOSKELETAL:  Negative for myalgias.      INTEGUMENTARY:  Negative for atypical mole(s) and rash.      NEUROLOGICAL:  Positive for dizziness ( occ ).   Negative for paresthesias or weakness.      PSYCHIATRIC:  Negative for anxiety and depression.          PMH/FMH/SH:     Last Reviewed on 3/18/2019 10:04 AM by Yolanda Marie    Past Medical History:         Hyperlipidemia    Hypertension     Sleep Apnea: (+) sleep study; uses CPAP;     Chronic Pain: affecting the low back;     Type 2 Diabetes: dx'd in 2010;     Glaucoma: dx'd in 2009;         PAST MEDICAL  HISTORY                 ADVANCED DIRECTIVES: None         PREVENTIVE HEALTH MAINTENANCE             COLORECTAL CANCER SCREENING:; 2014, pt. has consultation scheduled in May     Hepatitis C Medicare Screening: was last done 17; negative     PSA: was last done 2018 with normal results         PAST MEDICAL HISTORY             CURRENT MEDICAL PROVIDERS:    Dentist: Dr. Jacobo    Ophthalmologist: Dr. Bowling    Urologist: Dr. Wiley         Surgical History:         Procedures:    Colonoscopy ( 3-/Saenz (has had polyps)due follow up in  )         Family History:     Father:  at age 83;  Dementia;  Type 2 Diabetes     Mother:  at age 51; Cause of death was CVA     Brother(s): 3 brother(s) total; 2 ;  Myocardial Infarction (  71 );  cancer of pharynx     Sister(s): 2 sister(s) total; 2 ;   at birth     Daughter(s): 2 daughter(s) total         Social History:     Occupation: Disabled (due to low back pain)     Marital Status:      Children: 2 children         Tobacco/Alcohol/Supplements:     Last Reviewed on 3/18/2019 08:48 AM by Cathy Ramirez    Tobacco: He has never smoked.  Non-drinker             Immunizations:     Havrix -adult dose (HepA) 2018     Fluarix pf 9/10/2013     Fluarix pf 2015     Fluvirin (4 + years dose) 2016     Fluvirin (4 + years dose) 2017     Fluzone (3 + years dose) 9/10/2012     Fluzone pf (3+ years dose) 10/13/2011     PNEUMOVAX 23 (Pneumococcal PPV23) 10/13/2011     Zostavax (Zoster live) 2015         Allergies:     Last Reviewed on 3/18/2019 08:48 AM by Cathy Ramirez      No Known Drug Allergies.         Current Medications:     Last Reviewed on 3/18/2019 08:51 AM by Cathy Ramirez    Trazodone HCl 100mg Tablet 1 tab HS     FreeStyle Lancets  Lancet Check BS 1time daily DX :E11.9     Glucose Reagent Blood Test Strips  Reagent Strips Freestyle Lite check BS QD     Amlodipine  10mg Tablet 1 tab daily     Clonidine  HCl 0.2mg Tablets Take 1 tablet(s) by mouth bid     Januvia 100mg Tablet Take 1 tablet(s) by mouth daily     Lisinopril/Hydrochlorothiazide 20mg/12.5mg Tablet 1 bid     Metformin HCl 1,000mg Tablet One PO BID.     Potassium Chloride 10mEq Tablets, Extended Release Take 1 tablet(s) by mouth daily     Mobic 15mg Tablet 1 tab daily     Atorvastatin Calcium 20mg Tablet 1 tab daily     Combigan 0.2%/0.5% Ophthalmic Solution 1 drop each eye BID     CPAP machine         OBJECTIVE:        Vitals:         Current: 3/18/2019 8:47:29 AM    Ht:  5 ft, 11 in;  Wt: 259.4 lbs;  BMI: 36.2    T: 97.8 F (oral);  BP: 142/95 mm Hg (right arm, sitting);  P: 89 bpm (right arm (BP Cuff), sitting);  sCr: 1.02 mg/dL;  GFR: 84.89    VA: 20/30 OD, 20/40 OS (near, with correction)        Repeat:     8:47:45 AM     BP:   147/98mm Hg (left arm, sitting)     9:38:55 AM     BP:   117/84mm Hg (left arm, sitting)     8:48:00 AM     P:   84bpm (left arm (BP Cuff), sitting)         Exams:     PHYSICAL EXAM:     GENERAL: Vitals recorded well developed, well nourished;  well groomed;  no apparent distress;     EYES: lids and lacrimal system are normal in appearance; extraocular movements intact; conjunctiva and cornea are normal; PERRLA;     E/N/T:  normal EACs, TMs, nasal/oral mucosa, teeth, gingiva, and oropharynx;     NECK:  supple, full ROM; no thyromegaly; no carotid bruits;     RESPIRATORY: normal respiratory rate and pattern with no distress; normal breath sounds with no rales, rhonchi, wheezes or rubs;     CARDIOVASCULAR: normal rate; rhythm is regular;  no systolic murmur; no edema;     GASTROINTESTINAL: nontender; normal bowel sounds; no organomegaly;     LYMPHATICS:  no adenopathy in cervical, supraclavicular, axillary, or inguinal regions;     SKIN: skin of feet intact;     MUSCULOSKELETAL:  Normal range of motion, strength and tone;     NEUROLOGIC: sensation intact to feet bilaterally; GROSSLY INTACT     PSYCHIATRIC:  appropriate affect and  demeanor; normal speech pattern; grossly normal memory;         ASSESSMENT           V70.0   Z00.01  Health checkup              DDx:     V79.0   Z13.89  Screening for depression              DDx:     250.00   E11.9  Type 2 diabetes              DDx:     V76.44   Z12.5  Screening for prostate cancer              DDx:     401.1   I10  Hypertension              DDx:     V12.72   Z86.010  History of colonic polyps              DDx:         ORDERS:         Lab Orders:       [id number]  DIAB2 - [facility] CMP A1C LIPID AND MICRO ALBUM CR RATIO: [id number]  (Send-Out)         *  PRSAS Medicare screening PSA  (Send-Out)           Procedures Ordered:         Annual wellness visit, includes a PPPS, subsequent visit  (In-House)           Other Orders:         Negative EtOH screen  (In-House)           Calculated BMI above the upper parameter and a follow-up plan was documented in the medical record  (In-House)                   PLAN:          Health checkup recommend adacel, new shingles and second hep a vaccines, prevnar 13 at 65     MIPS     BMI Elevated - Follow-Up Plan: He was provided education on weight loss strategies     COUNSELING was provided today regarding the following topics: healthy eating habits, low salt diet, regular exercise, use of seat belts, Advanced Directives information given, ADVISED TO SEE AN EYE DOCTOR AND A DENTIST REGULARLY, and Given Home Safety Handout.            Orders:         Calculated BMI above the upper parameter and a follow-up plan was documented in the medical record  (In-House)           Annual wellness visit, includes a PPPS, subsequent visit  (In-House)             Patient Education Handouts:       Physical Exam 60+ year, Male           Screening for depression     MIPS PHQ-9 Depression Screening Completed form scanned and in chart; Total Score 6 Negative alcohol screen           Orders:         Negative EtOH screen  (In-House)             Type 2 diabetes updated diabetes flow sheet, send for last eye exam     LABORATORY:  Labs ordered to be performed today include Diabetes Panel 2;CMP, A1C, Lipid, Microalbumin:Creatinine Ratio.            Orders:       [id number]  DIAB - [facility] CMP A1C LIPID AND MICRO ALBUM CR RATIO: [id number]  (Send-Out)            Screening for prostate cancer     LABORATORY:  Labs ordered to be performed today include PSA Screening Medicare patients.            Orders:       *  PRSAS Medicare screening PSA  (Send-Out)            Hypertension         RECOMMENDATIONS given include: perform routine monitoring of blood pressure with home blood pressure cuff and reduction of dietary salt intake.           History of colonic polyps keep appt in May for his follow up colon screening             Patient Recommendations:        For  Health checkup:     Limit dietary intake of fat (especially saturated fat) and cholesterol.  Eat a variety of foods, including plenty of fruits, vegetables, and grain containg fiber, limit fat intake to 30% of total calories. Balance caloric intake with energy expended. Maintaining regular physical activity is advised to help prevent heart disease, hypertension, diabetes, and obesity. Always use shoulder/lap restraints when driving or riding in a vehicle, even those equipped with air bags.          For  Hypertension:     Begin monitoring your blood pressure by brief nurse visits at our office, a home blood pressure monitor, or by checking on the machines in pharmacies or stores.  Keep a log of the readings. Reduce the amount of salt in your diet.              CHARGE CAPTURE           **Please note: ICD descriptions below are intended for billing purposes only and may not represent clinical diagnoses**        Primary Diagnosis:         V70.0 Health checkup            Z00.01    Encounter for general adult medical exam w abnormal findings              Orders:             Calculated BMI above  the upper parameter and a follow-up plan was documented in the medical record  (In-House)                Annual wellness visit, includes a PPPS, subsequent visit  (In-House)           V79.0 Screening for depression            Z13.89    Encounter for screening for other disorder              Orders:             Negative EtOH screen  (In-House)           250.00 Type 2 diabetes            E11.9    Type 2 diabetes mellitus without complications    V76.44 Screening for prostate cancer            Z12.5    Encounter for screening for malignant neoplasm of prostate    401.1 Hypertension            I10    Essential (primary) hypertension    V12.72 History of colonic polyps            Z86.010    Personal history of colonic polyps
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
## 2021-05-18 NOTE — PROGRESS NOTES
Maksim Acosta  1955     Office/Outpatient Visit    Visit Date: Tue, Nov 10, 2020 08:34 am    Provider: Yolanda Marie N.P. (Assistant: Alejandra Rodriguez RN)    Location: [facility]        Electronically signed by Yolanda Marie N.P. on  11/10/2020 10:33:39 AM                             Subjective:        CC: Mr. Acosta is a 65 year old Black or  male.  This is a follow-up visit.  Medication refills         HPI:           PHQ-9 Depression Screening: Completed form scanned and in chart; Total Score enter total score:  5           With regard to the type 2 diabetes mellitus without complications, current meds include an oral hypoglycemic ( Glucophage ( 1000mg bid ) and Januvia ( 100 mg QD ) ) and insulin/injectable ( Trulicity- 1.5 ).  He reports home blood glucose readings have averaged fasting readings in the 140-190 mg/dL range.  Most recent lab results include Hemoglobin A1c:  6.9 (%) (05/07/2020), HDL:  49 (mg/dL) (05/07/2020), Triglycerides:  79 (mg/dL) (05/07/2020), LDL:  44 (mg/dL) (05/07/2020), Weight (lb):  249.8 (05/07/2020), Microalbuminuria:  6.6 (mg/g creat) (05/07/2020), Dilated Eye Exam by date:  11/20/2019 (09/10/2019), Foot Exam (Annual):  03/18/2019 (03/18/2019).            Hyperlipidemia, unspecified details; current treatment includes Lipitor.  Compliance with treatment has been good; he takes his medication as directed.  He denies experiencing any hypercholesterolemia related symptoms.            Dx with insomnia, unspecified; better with Trazodone           In regard to the essential (primary) hypertension, his current cardiac medication regimen includes a calcium channel blocker ( Norvasc ), an adrenergic inhibitor ( Catapres ), and a combination medication ( Zestoretic ).  Review of his blood pressure log reveals systolics in the 117-138 and diastolics in the 76-91.  He is tolerating the medication well without side effects.  Compliance  with treatment has been good; he takes his medication as directed.            Additionally, he presents with history of low back pain.  he notes some pain relief with Mobic.      ROS:     CONSTITUTIONAL:  Negative for fever.  not eating healthy and not exercising     CARDIOVASCULAR:  Negative for chest pain, palpitations, tachycardia, orthopnea, and edema.      RESPIRATORY:  Negative for recent cough and dyspnea.      MUSCULOSKELETAL:  Positive for neck and knee pain.      NEUROLOGICAL:  Negative for dizziness, headaches, paresthesias, and weakness.          Past Medical History / Family History / Social History:         Last Reviewed on 11/10/2020 08:54 AM by Yolanda Marie    Past Medical History:         Hyperlipidemia    Hypertension     Sleep Apnea: (+) sleep study; uses CPAP;     Chronic Pain: affecting the low back;     Type 2 Diabetes: dx'd in ;     Glaucoma: dx'd in ;         PREVENTIVE HEALTH MAINTENANCE             COLORECTAL CANCER SCREENING: Up to date (colonoscopy q10y; sigmoidoscopy q5y; Cologuard q3y) was last done 19, Results are in chart; colonoscopy with the following abnormalities noted-- Diverticulosis; The next colonoscopy is due  ; 2014, pt. has consultation scheduled in May     EYE EXAM: Diabetic Eye Exam during this calendar year and results are in chart was last done 19     Hepatitis C Medicare Screening: was last done 17; negative     PSA: was last done 3/18/19 with normal results         PAST MEDICAL HISTORY             CURRENT MEDICAL PROVIDERS:    Dentist: Dr. Cabrera    Neurologist: Sushila Barclay    Ophthalmologist: Dr. Bowling    Pulmonologist: Dr. Fischer - [place]             ADVANCED DIRECTIVES: None         Surgical History:         Procedures:    Colonoscopy         Family History:     Father:  at age 83;  Dementia;  Type 2 Diabetes     Mother:  at age 51; Cause of death was CVA     Brother(s): 3 brother(s) total; 2 ;   Myocardial Infarction (  71 );  cancer of pharynx     Sister(s): 2 sister(s) total; 2 ;   at birth     Daughter(s): 2 daughter(s) total         Social History:     Occupation: Disabled (due to low back pain)     Marital Status:      Children: 2 children         Tobacco/Alcohol/Supplements:     Last Reviewed on 11/10/2020 08:34 AM by Alejandra Rodriguez    Tobacco: He has a past history of cigarette smoking; quit date:  .  Non-drinker         Immunizations:     influenza, trivalent, adjuvanted 2020    Havrix -adult dose (HepA) 2018    Hep A, adult dose 3/19/2019    Fluarix pf 9/10/2013    Fluarix pf 2015    Fluarix pf 2019    Fluvirin (4 + years dose) 2016    Fluvirin (4 + years dose) 2017    Fluzone (3 + years dose) 9/10/2012    Fluzone (3 + years dose) 2018    Fluzone pf (3+ years dose) 10/13/2011    PNEUMOVAX 23 (Pneumococcal PPV23) 10/13/2011    Adacel (Tdap) 3/19/2019    Shingrix (Zoster recombinant) 3/19/2019    Shingrix (Zoster recombinant) 2019    Zostavax (Zoster live) 2015        Allergies:     Last Reviewed on 11/10/2020 08:34 AM by Alejandra Rodriguez    No Known Allergies.        Current Medications:     Last Reviewed on 11/10/2020 08:55 AM by Yolanda Marie    CPAP machine     lisinopriL-hydrochlorothiazide 20-12.5 mg oral tablet [Take 1 tablet by mouth twice daily]    amLODIPine 10 mg oral tablet [Take 1 tablet by mouth once daily]    metFORMIN 1,000 mg oral tablet [One PO BID.]    traZODone 100 mg oral tablet [TAKE 1 TABLET BY MOUTH AT BEDTIME]    Januvia 100 mg oral tablet [Take 1 tablet by mouth once daily]    FreeStyle Lancets  Lancet [Check BS 1time daily DX :E11.9]    Combigan 0.2-0.5 % ophthalmic (eye) Drops [1 drop each eye BID]    Mobic 15 mg oral tablet [Take 1 tablet by mouth once daily with food]    cloNIDine HCl 0.2 mg oral tablet [Take 1 tablet by mouth twice daily]    aspirin 81 mg oral tablet, delayed release  (enteric coated) [Take 1 tablet by mouth once daily]    blood sugar diagnostic strips  [USE TO CHECK GLUCOSE ONCE OR TWICE DAILY  DX: E11.9]    potassium chloride 10 mEq oral tablet, extended release [Take 1 tablet by mouth twice daily]    atorvastatin 20 mg oral tablet [Take 1 tablet by mouth once daily]    multivitamin     Trulicity 1.5 mg/0.5 mL subcutaneous Pen Injector [INJECT 1.5MG (0.5ML) SUBCUTANEOUSLY ONCE WEEKLY]    lancets  [Check blood sugar one to two times daily. Dx: E11.9]        Objective:        Vitals:         Current: 11/10/2020 8:38:02 AM    Ht:  5 ft, 11 in;  Wt: 261.4 lbs;  BMI: 36.5T: 97.5 F (oral);  BP: 118/84 mm Hg (right arm, sitting);  P: 88 bpm (right arm (BP Cuff), sitting);  sCr: 1.04 mg/dL;  GFR: 81.43        Exams:     PHYSICAL EXAM:     GENERAL: Vitals recorded well developed, well nourished;  well groomed;  no apparent distress;     NECK: carotid exam reveals no bruits;     RESPIRATORY: normal respiratory rate and pattern with no distress; normal breath sounds with no rales, rhonchi, wheezes or rubs;     CARDIOVASCULAR: normal rate; rhythm is regular;  no systolic murmur;    Peripheral Pulses: posterior tibial: equal bilaterally;  trace pedal edema;     SKIN: skin of feet and toenails intact bilaterally;     NEUROLOGIC: sensation intact to monofilament bilaterally;     PSYCHIATRIC:  appropriate affect and demeanor; normal speech pattern; grossly normal memory;         Assessment:         Z13.31   Encounter for screening for depression       E11.9   Type 2 diabetes mellitus without complications       E78.5   Hyperlipidemia, unspecified       G47.00   Insomnia, unspecified       I10   Essential (primary) hypertension       M54.5   Low back pain           ORDERS:         Meds Prescribed:       [Refilled] traZODone 100 mg oral tablet [TAKE 1 TABLET BY MOUTH AT BEDTIME], #90 (ninety) each, Refills: 1 (one)       [Refilled] aspirin 81 mg oral tablet, delayed release (enteric coated)  [Take 1 tablet by mouth once daily], #90 (ninety) tablets, Refills: 1 (one)       [Refilled] cloNIDine HCl 0.2 mg oral tablet [Take 1 tablet by mouth twice daily], #180 (one hundred and eighty) tablets, Refills: 0 (zero)       [Refilled] lisinopriL-hydrochlorothiazide 20-12.5 mg oral tablet [Take 1 tablet by mouth twice daily], #180 (one hundred and eighty) tablets, Refills: 1 (one)         Lab Orders:       [id number]  DIAB1 - [facility] LIPID,CMP, A1C: 05957, 12119, [id number]  (Send-Out)              Other Orders:         Depression screen negative  (In-House)                      Plan:         Encounter for screening for depression    MIPS PHQ-9 Depression Screening: Completed form scanned and in chart; Total Score 0; Negative Depression Screen           Orders:         Depression screen negative  (In-House)              Type 2 diabetes mellitus without complicationsupdated diabetes flow sheet/ send for recent eye exam from [facility] /let me know when he needs his refills    LABORATORY:  Labs ordered to be performed today include Diabetes Panel 1; CMP, Lipid, A1C.      RECOMMENDATIONS: continue to monitor sugars, work on diet and exercise, take rx as directed.      FOLLOW-UP: pending labs           Orders:       [id number]  DIAB - [facility] LIPID,CMP, A1C: 47327, 10726, [id number]  (Send-Out)              Hyperlipidemia, unspecifiedcontinue statin         Insomnia, unspecified          Prescriptions:       [Refilled] traZODone 100 mg oral tablet [TAKE 1 TABLET BY MOUTH AT BEDTIME], #90 (ninety) each, Refills: 1 (one)         Essential (primary) hypertensionhe has not GI issues, takes his ASA low dose daily and wants to continue taking         RECOMMENDATIONS given include: perform routine monitoring of blood pressure with home blood pressure cuff.            Prescriptions:       [Refilled] aspirin 81 mg oral tablet, delayed release (enteric coated) [Take 1 tablet by mouth once daily], #90 (ninety) tablets, Refills: 1 (one)        [Refilled] cloNIDine HCl 0.2 mg oral tablet [Take 1 tablet by mouth twice daily], #180 (one hundred and eighty) tablets, Refills: 0 (zero)       [Refilled] lisinopriL-hydrochlorothiazide 20-12.5 mg oral tablet [Take 1 tablet by mouth twice daily], #180 (one hundred and eighty) tablets, Refills: 1 (one)         Low back paincontinue mobic             Patient Recommendations:        For  Essential (primary) hypertension:    Begin monitoring your blood pressure by brief nurse visits at our office, a home blood pressure monitor, or by checking on the machines in pharmacies or stores.  Keep a log of the readings.              Charge Capture:         Primary Diagnosis:     Z13.31  Encounter for screening for depression           Orders:      [id number]  Office/outpatient visit; established patient, level 4  (In-House)              Depression screen negative  (In-House)              E11.9  Type 2 diabetes mellitus without complications     E78.5  Hyperlipidemia, unspecified     G47.00  Insomnia, unspecified     I10  Essential (primary) hypertension     M54.5  Low back pain
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
## 2021-05-18 NOTE — PROGRESS NOTES
Maksim Acosta 1955     Office/Outpatient Visit    Visit Date: Mon, Aug 19, 2019 09:54 am    Provider: Yolanda Marie N.P. (Assistant: Sherri Stephen MA)    Location: [facility]        Electronically signed by Yolanda Marie N.P. on  08/19/2019 12:17:44 PM                             SUBJECTIVE:        CC:     Mr. Acosta is a 64 year old Black or  male.  This is a follow-up visit.  He had his flu and second hep a vaccine already this year at [facility].          HPI:         Patient to be evaluated for type 2 diabetes.  Current meds include an oral hypoglycemic ( Glucophage ( 1000mg bid ) and Januvia ).  He does not perform home blood glucose monitoring.  Most recent lab results include Hemoglobin A1c:  7.6 (%) (03/18/2019), HDL:  54 (mg/dL) (03/18/2019), Triglycerides:  144 (mg/dL) (03/18/2019), LDL:  62 (mg/dL) (03/18/2019), Systolic BP:  133 (08/19/2019), Diastolic BP:  92 (08/19/2019), Microalbuminuria:  11.4 (mg/g creat) (03/18/2019), Dilated Eye Exam by date:  03/01/2019 (03/18/2019), Foot Exam (Annual):  03/18/2019 (03/18/2019).          Hyperlipidemia details; current treatment includes Lipitor.  Compliance with treatment has been good; he takes his medication as directed.  He denies experiencing any hypercholesterolemia related symptoms.          Concerning hypertension, his current cardiac medication regimen includes a calcium channel blocker ( Norvasc ), an adrenergic inhibitor ( Catapres ), and a combination medication ( Zestoretic ).  He is tolerating the medication well without side effects.  Compliance with treatment has been good; he takes his medication as directed.          PHQ-9 Depression Screening: Completed form scanned and in chart; Total Score 8 Alcohol Consumption Screening: Completed form scanned and in chart; Total Score 0     ROS:     CONSTITUTIONAL:  Negative for chills, fatigue, fever, and weight change.      CARDIOVASCULAR:  Negative for  chest pain, palpitations, tachycardia, orthopnea, and edema.      RESPIRATORY:  Negative for cough, dyspnea, and hemoptysis.      MUSCULOSKELETAL:  Positive for takes mobic, does not need refills.      NEUROLOGICAL:  Negative for dizziness, headaches, paresthesias, and weakness.          PMH/FMH/SH:     Last Reviewed on 2019 10:16 AM by Yolanda Marie    Past Medical History:         Hyperlipidemia    Hypertension     Sleep Apnea: (+) sleep study; uses CPAP;     Chronic Pain: affecting the low back;     Type 2 Diabetes: dx'd in ;     Glaucoma: dx'd in ;         PAST MEDICAL HISTORY                 ADVANCED DIRECTIVES: None         PREVENTIVE HEALTH MAINTENANCE             COLORECTAL CANCER SCREENING: Up to date (colonoscopy q10y; sigmoidoscopy q5y; Cologuard q3y) was last done 19, Results are in chart; colonoscopy with the following abnormalities noted-- Diverticulosis; 2014, pt. has consultation scheduled in May     Hepatitis C Medicare Screening: was last done 17; negative     PSA: was last done 2018 with normal results         PAST MEDICAL HISTORY             CURRENT MEDICAL PROVIDERS:    Dentist: Dr. Jacobo    Ophthalmologist: Dr. Bowling    Urologist: Dr. Wiley         Surgical History:         Procedures:    Colonoscopy ( 3-/Dany (has had polyps)due follow up in  )         Family History:     Father:  at age 83;  Dementia;  Type 2 Diabetes     Mother:  at age 51; Cause of death was CVA     Brother(s): 3 brother(s) total; 2 ;  Myocardial Infarction (  71 );  cancer of pharynx     Sister(s): 2 sister(s) total; 2 ;   at birth     Daughter(s): 2 daughter(s) total         Social History:     Occupation: Disabled (due to low back pain)     Marital Status:      Children: 2 children         Tobacco/Alcohol/Supplements:     Last Reviewed on 2019 09:57 AM by Sherri Stephen    Tobacco: He has a past history of cigarette  smoking; quit date:  2012.  Non-drinker             Immunizations:     Havrix -adult dose (HepA) 6/19/2018     Hep A, adult dose 3/19/2019     Fluarix pf 9/10/2013     Fluarix pf 8/27/2015     Fluarix pf 8/8/2019     Fluvirin (4 + years dose) 9/6/2016     Fluvirin (4 + years dose) 8/29/2017     Fluzone (3 + years dose) 9/10/2012     Fluzone (3 + years dose) 8/17/2018     Fluzone pf (3+ years dose) 10/13/2011     PNEUMOVAX 23 (Pneumococcal PPV23) 10/13/2011     Adacel (Tdap) 3/19/2019     Shingrix (Zoster recombinant) 3/19/2019     Shingrix (Zoster recombinant) 8/9/2019     Zostavax (Zoster live) 9/19/2015         Allergies:     Last Reviewed on 8/19/2019 09:57 AM by Sherri Stephen      No Known Drug Allergies.         Current Medications:     Last Reviewed on 8/19/2019 09:58 AM by Sherri Stephen    Lisinopril/Hydrochlorothiazide 20mg/12.5mg Tablet 1 bid     Metformin HCl 1,000mg Tablet One PO BID.     Potassium Chloride 10mEq Tablets, Extended Release Take 1 tablet(s) by mouth daily     Atorvastatin Calcium 20mg Tablet 1 tab daily     Januvia 100mg Tablet Take 1 tablet(s) by mouth daily     Mobic 15mg Tablet 1 tab daily     Amlodipine  10mg Tablet 1 tab daily     Trazodone HCl 100mg Tablet 1 tab HS     FreeStyle Lancets  Lancet Check BS 1time daily DX :E11.9     Glucose Reagent Blood Test Strips  Reagent Strips Freestyle Lite check BS QD     Clonidine HCl 0.2mg Tablets Take 1 tablet(s) by mouth bid     Combigan 0.2%/0.5% Ophthalmic Solution 1 drop each eye BID     CPAP machine     multivitamin         OBJECTIVE:        Vitals:         Current: 8/19/2019 10:01:05 AM    Ht:  5 ft, 11 in;  Wt: 263.4 lbs;  BMI: 36.7    T: 97.8 F (oral);  BP: 134/92 mm Hg (left arm, sitting);  P: 86 bpm (left arm (BP Cuff), sitting);  sCr: 0.9 mg/dL;  GFR: 95.62        Repeat:     10:01:38 AM     BP:   133/92mm Hg (right arm, sitting)         Exams:     PHYSICAL EXAM:     GENERAL: Vitals recorded well developed, well nourished;   well groomed;  no apparent distress;     NECK: carotid exam reveals no bruits;     RESPIRATORY: normal respiratory rate and pattern with no distress; normal breath sounds with no rales, rhonchi, wheezes or rubs;     CARDIOVASCULAR: normal rate; rhythm is regular;  no systolic murmur; no edema;     PSYCHIATRIC:  appropriate affect and demeanor; normal speech pattern; grossly normal memory;         ASSESSMENT           250.00   E11.9  Type 2 diabetes              DDx:     272.4   E78.5  Hyperlipidemia              DDx:     401.1   I10  Hypertension              DDx:     V79.0   Z13.89  Screening for depression              DDx:         ORDERS:         Meds Prescribed:       Refill of: Lisinopril/Hydrochlorothiazide 20mg/12.5mg Tablet 1 bid  #180 (One [place] [address]) tablet(s) Refills: 1       Refill of: Metformin HCl 1,000mg Tablet One PO BID.  #180 (One [place] [address]) tablet(s) Refills: 1       Refill of: Potassium Chloride 10mEq Tablets, Extended Release Take 1 tablet(s) by mouth daily  #90 (Ninety) tablet(s) Refills: 1       Refill of: Atorvastatin Calcium 20mg Tablet 1 tab daily  #90 (Ninety) tablet(s) Refills: 1       Refill of: Januvia (Sitagliptin Phosphate) 100mg Tablet Take 1 tablet(s) by mouth daily  #90 (Ninety) tablet(s) Refills: 1       Refill of: Amlodipine  10mg Tablet 1 tab daily  #90 (Ninety) tablet(s) Refills: 1       Refill of: Clonidine HCl 0.2mg Tablets Take 1 tablet(s) by mouth bid  #180 (One [place] [address]) tablet(s) Refills: 1         Lab Orders:       [id number]  DIAB - [facility] LIPID,CMP, A1C: 74290, 05451, 40580  (Send-Out)           Other Orders:         Depression screen negative  (In-House)           Negative EtOH screen  (In-House)                   PLAN:          Type 2 diabetes needs a new meter, order written/ [facility] is his pharmacy     LABORATORY:  Labs ordered to be performed today include Diabetes Panel 1; CMP, Lipid, A1C.            Prescriptions:       Refill of:  Metformin HCl 1,000mg Tablet One PO BID.  #180 (One [place] [address]) tablet(s) Refills: 1       Refill of: Januvia (Sitagliptin Phosphate) 100mg Tablet Take 1 tablet(s) by mouth daily  #90 (Ninety) tablet(s) Refills: 1           Orders:       [id number]  [address] LIPID,CMP, A1C: 59087, 98853, 91035  (Send-Out)            Hyperlipidemia         RECOMMENDATIONS given include: exercise and low cholesterol/low fat diet.      FOLLOW-UP: pending lab results           Prescriptions:       Refill of: Atorvastatin Calcium 20mg Tablet 1 tab daily  #90 (Ninety) tablet(s) Refills: 1          Hypertension         RECOMMENDATIONS given include: reduction of dietary salt intake and Advised about free BP checks on the last Saturday of each month.            Prescriptions:       Refill of: Lisinopril/Hydrochlorothiazide 20mg/12.5mg Tablet 1 bid  #180 (One [place] [address]) tablet(s) Refills: 1       Refill of: Potassium Chloride 10mEq Tablets, Extended Release Take 1 tablet(s) by mouth daily  #90 (Ninety) tablet(s) Refills: 1       Refill of: Amlodipine  10mg Tablet 1 tab daily  #90 (Ninety) tablet(s) Refills: 1       Refill of: Clonidine HCl 0.2mg Tablets Take 1 tablet(s) by mouth bid  #180 (One [place] [address]) tablet(s) Refills: 1          Screening for depression     MIPS PHQ-9 Depression Screening: Completed form scanned and in chart; Total Score 8; Negative Depression Screen Negative alcohol screen           Orders:         Depression screen negative  (In-House)           Negative EtOH screen  (In-House)               Patient Recommendations:        For  Hyperlipidemia:     Maintain a regular exercise program. Reduce the amount of cholesterol and saturated fat in your diet.          For  Hypertension:     Reduce the amount of salt in your diet.              CHARGE CAPTURE           **Please note: ICD descriptions below are intended for billing purposes only and may not represent clinical diagnoses**         Primary Diagnosis:         250.00 Type 2 diabetes            E11.9    Type 2 diabetes mellitus without complications              Orders:          [id number]   Office/outpatient visit; established patient, level 4  (In-House)           272.4 Hyperlipidemia            E78.5    Hyperlipidemia, unspecified    401.1 Hypertension            I10    Essential (primary) hypertension    V79.0 Screening for depression            Z13.89    Encounter for screening for other disorder              Orders:             Depression screen negative  (In-House)                Negative EtOH screen  (In-House)

## 2021-05-18 NOTE — PROGRESS NOTES
Maksim Acosta  1955     Office/Outpatient Visit    Visit Date: Wed, Dec 11, 2019 10:18 am    Provider: Yolanda Marie N.P. (Assistant: Pamela Keller, )    Location: [facility]        Electronically signed by Yolanda Marie N.P. on  12/11/2019 12:51:11 PM                             Subjective:        CC: Mr. Acosta is a 64 year old Black or  male.  This is a follow-up visit.          HPI:           Mr. Acosta presents with type 2 diabetes mellitus without complications.  Current meds include an oral hypoglycemic ( Glucophage ( 1000mg bid ) and januvia ) and insulin/injectable ( trulicity ).  He reports home blood glucose readings have averaged fasting readings in the 130-150's mg/dL range.  Most recent lab results include Hemoglobin A1c:  8.6 (%) (08/19/2019), HDL:  52 (mg/dL) (08/19/2019), Triglycerides:  137 (mg/dL) (08/19/2019), LDL:  52 (mg/dL) (08/19/2019), Weight (lb):  252.2 (12/11/2019), Systolic BP:  125 (12/11/2019), Diastolic BP:  87 (12/11/2019), Microalbuminuria:  11.4 (mg/g creat) (03/18/2019), Dilated Eye Exam by date:  11/20/2019 (09/10/2019), Foot Exam (Annual):  03/18/2019 (03/18/2019).            Hyperlipidemia, unspecified details; current treatment includes Lipitor.  Compliance with treatment has been good; he takes his medication as directed.  He denies experiencing any hypercholesterolemia related symptoms.            In regard to the essential (primary) hypertension, his current cardiac medication regimen includes a calcium channel blocker ( Norvasc ), an adrenergic inhibitor ( Catapres ), and a combination medication ( Zestoretic ).  He is tolerating the medication well without side effects.  Compliance with treatment has been good; he takes his medication as directed.      ROS:     CONSTITUTIONAL:  Positive for intentional weight loss.      CARDIOVASCULAR:  Negative for chest pain, palpitations, tachycardia, orthopnea, and edema.       RESPIRATORY:  Negative for cough, dyspnea, and hemoptysis.      NEUROLOGICAL:  Negative for dizziness, headaches, paresthesias, and weakness.          Past Medical History / Family History / Social History:         Last Reviewed on 2019 12:47 PM by Yolanda Marie    Past Medical History:         Hyperlipidemia    Hypertension     Sleep Apnea: (+) sleep study; uses CPAP;     Chronic Pain: affecting the low back;     Type 2 Diabetes: dx'd in ;     Glaucoma: dx'd in ;         PREVENTIVE HEALTH MAINTENANCE             COLORECTAL CANCER SCREENING: Up to date (colonoscopy q10y; sigmoidoscopy q5y; Cologuard q3y) was last done 19, Results are in chart; colonoscopy with the following abnormalities noted-- Diverticulosis; 2014, pt. has consultation scheduled in May     EYE EXAM: Diabetic Eye Exam during this calendar year and results are in chart was last done 19     Hepatitis C Medicare Screening: was last done 17; negative     PSA: was last done 3/18/19 with normal results         PAST MEDICAL HISTORY             CURRENT MEDICAL PROVIDERS:    Dentist: Dr. Cabrera    Neurologist: Sushila Barclay    Ophthalmologist: Dr. Bowling             ADVANCED DIRECTIVES: None         Family History:     Father:  at age 83;  Dementia;  Type 2 Diabetes     Mother:  at age 51; Cause of death was CVA     Brother(s): 3 brother(s) total; 2 ;  Myocardial Infarction (  71 );  cancer of pharynx     Sister(s): 2 sister(s) total; 2 ;   at birth     Daughter(s): 2 daughter(s) total         Social History:     Occupation: Disabled (due to low back pain)     Marital Status:      Children: 2 children         Tobacco/Alcohol/Supplements:     Last Reviewed on 2019 10:21 AM by Pamela Keller    Tobacco: He has a past history of cigarette smoking; quit date:  .  Non-drinker         Immunizations:     Havrix -adult dose (HepA) 2018    Hep A, adult dose  3/19/2019    Fluarix pf 9/10/2013    Fluarix pf 8/27/2015    Fluarix pf 8/9/2019    Fluvirin (4 + years dose) 9/6/2016    Fluvirin (4 + years dose) 8/29/2017    Fluzone (3 + years dose) 9/10/2012    Fluzone (3 + years dose) 8/17/2018    Fluzone pf (3+ years dose) 10/13/2011    PNEUMOVAX 23 (Pneumococcal PPV23) 10/13/2011    Adacel (Tdap) 3/19/2019    Shingrix (Zoster recombinant) 3/19/2019    Shingrix (Zoster recombinant) 8/9/2019    Zostavax (Zoster live) 9/19/2015        Allergies:     Last Reviewed on 12/11/2019 10:21 AM by Pamela Keller    No Known Allergies.        Current Medications:     Last Reviewed on 12/11/2019 10:21 AM by Pamela Keller    CPAP machine     lisinopril-hydrochlorothiazide 20-12.5 mg oral tablet [1 bid]    metFORMIN 1,000 mg oral tablet [One PO BID.]    Januvia 100mg Tablet [Take 1 tablet(s) by mouth daily]    Amlodipine  10 mg oral tablet [1 tab daily]    traZODone 100 mg oral tablet [TAKE 1 TABLET BY MOUTH AT BEDTIME]    FreeStyle Lancets  Lancet [Check BS 1time daily DX :E11.9]    Combigan 0.2-0.5 % ophthalmic (eye) Drops [1 drop each eye BID]    Mobic 15mg Tablet [1 tab daily]    Clonidine HCl 0.2mg Tablets [Take 1 tablet(s) by mouth bid]    Accu-Chek Marisela Plus test strips  [USE TO CHECK GLUCOSE ONCE OR TWICE DAILY]    Potassium Chloride 10 mEq oral tablet, extended release [Take 1 tablet(s) by mouth daily]    atorvastatin 20 mg oral tablet [1 tab daily]    multivitamin     Trulicity 1.5 mg/0.5 mL subcutaneous Pen Injector [inject 0.5 milliliter (1.5 mg) by subcutaneous route every 7 days in the abdomen, thigh, or upper arm rotating injection sites]        Objective:        Vitals:         Current: 12/11/2019 10:23:23 AM    Ht:  5 ft, 11 in;  Wt: 252.2 lbs;  BMI: 35.2T: 97.8 F (oral);  BP: 125/87 mm Hg (left arm, sitting);  P: 105 bpm (left arm (BP Cuff), sitting);  sCr: 0.93 mg/dL;  GFR: 90.84        Exams:     PHYSICAL EXAM:     GENERAL: Vitals recorded well developed,  well nourished;  well groomed;  no apparent distress;     NECK: carotid exam reveals no bruits;     RESPIRATORY: normal respiratory rate and pattern with no distress; normal breath sounds with no rales, rhonchi, wheezes or rubs;     CARDIOVASCULAR: normal rate; rhythm is regular;  no systolic murmur; no edema;     PSYCHIATRIC:  appropriate affect and demeanor; normal speech pattern; grossly normal memory;         Assessment:         E11.9   Type 2 diabetes mellitus without complications       E78.5   Hyperlipidemia, unspecified       I10   Essential (primary) hypertension       780.93   Memory loss NOS   (Mild)     R41.89   Other symptoms and signs involving cognitive functions and awareness           ORDERS:         Meds Prescribed:       [Refilled] metFORMIN 1,000 mg oral tablet [One PO BID.], #180 (one hundred and eighty) tablets, Refills: 1 (one)         Lab Orders:       [id number]  DIAB - [facility] LIPID,CMP, A1C: 27664, 48766, 05813  (Send-Out)                      Plan:         Type 2 diabetes mellitus without complicationsreviewed his diabetes flow sheet, recheck labs today    LABORATORY:  Labs ordered to be performed today include Diabetes Panel 1; CMP, Lipid, A1C.      FOLLOW-UP: pending labs           Prescriptions:       [Refilled] metFORMIN 1,000 mg oral tablet [One PO BID.], #180 (one hundred and eighty) tablets, Refills: 1 (one)           Orders:       [id number]  DIAB - [facility] LIPID,CMP, A1C: 50104, 18481, 80482  (Send-Out)              Hyperlipidemia, unspecified        RECOMMENDATIONS given include: exercise, low cholesterol/low fat diet, and weight loss.          Essential (primary) hypertensioncontinue rx's         Other symptoms and signs involving cognitive functions and awarenessreviewed note from neurologist, due follow up in 9-2020            Patient Recommendations:        For  Hyperlipidemia, unspecified:    Maintain a regular exercise program. Reduce the amount of cholesterol and saturated fat in your  diet. Try to lose some weight; even modest weight reduction can improve your blood pressure.              Charge Capture:         Primary Diagnosis:     E11.9  Type 2 diabetes mellitus without complications           Orders:      [id number]  Office/outpatient visit; established patient, level 4  (In-House)              E78.5  Hyperlipidemia, unspecified     I10  Essential (primary) hypertension     780.93  Memory loss NOS     R41.89  Other symptoms and signs involving cognitive functions and awareness
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
## 2021-05-18 NOTE — PROGRESS NOTES
Maksim Acosta 1955     Office/Outpatient Visit    Visit Date: Sat, Apr 13, 2019 12:18 pm    Provider: Sonny Head MD (Assistant: Sarah Spurling, MA)    Location: [facility]        Electronically signed by Sonny Head MD on  04/13/2019 12:51:52 PM                             SUBJECTIVE:        CC:     Mr. Acosta is a 63 year old Black or  male.  cough for two weeks.          HPI:     little clear brown hear rattle at night Mr. Acosta in stating that he has had a cough for couple days with a little bit of clear brown sputum.  No fever or chills.  Does not feel short of breath.  He lives in [place] so is exposed to lots of different folks.  Says some people of had pneumonia recently.  He is not aware of any influenza.     The other issue is developed jock itch been present for quite a while he has been through several cans of Lotrimin spray which she says helps but then it comes right back.  He does have diabetes and the recent lab work has shown improvement in his hemoglobin A1c.  He is not on Jardiance or other SGL T-2 type medication.     ROS:     CONSTITUTIONAL:  Negative for chills, fatigue and fever.      CARDIOVASCULAR:  Negative for chest pain, orthopnea, paroxysmal nocturnal dyspnea and pedal edema.      RESPIRATORY:  Negative for dyspnea and hemoptysis.      GASTROINTESTINAL:  Negative for abdominal pain, heartburn, constipation, diarrhea, and stool changes.      GENITOURINARY:  Negative for dysuria and polyuria.      PSYCHIATRIC:  Negative for anxiety and depression.          PM/FM/SH:     Last Reviewed on 3/18/2019 10:04 AM by Yolanda Marie    Past Medical History:         Hyperlipidemia    Hypertension     Sleep Apnea: (+) sleep study; uses CPAP;     Chronic Pain: affecting the low back;     Type 2 Diabetes: dx'd in 2010;     Glaucoma: dx'd in 2009;         PAST MEDICAL HISTORY                 ADVANCED DIRECTIVES: None         PREVENTIVE HEALTH  MAINTENANCE             COLORECTAL CANCER SCREENING:; 2014, pt. has consultation scheduled in May     Hepatitis C Medicare Screening: was last done 17; negative     PSA: was last done 2018 with normal results         PAST MEDICAL HISTORY             CURRENT MEDICAL PROVIDERS:    Dentist: Dr. Jacobo    Ophthalmologist: Dr. Bowling    Urologist: Dr. Wiley         Surgical History:         Procedures:    Colonoscopy ( 3-/Dany (has had polyps)due follow up in  )         Family History:     Father:  at age 83;  Dementia;  Type 2 Diabetes     Mother:  at age 51; Cause of death was CVA     Brother(s): 3 brother(s) total; 2 ;  Myocardial Infarction (  71 );  cancer of pharynx     Sister(s): 2 sister(s) total; 2 ;   at birth     Daughter(s): 2 daughter(s) total         Social History:     Occupation: Disabled (due to low back pain)     Marital Status:      Children: 2 children         Tobacco/Alcohol/Supplements:     Last Reviewed on 3/18/2019 08:48 AM by Cathy Ramirez    Tobacco: He has never smoked.  Non-drinker         Substance Abuse History:     Last Reviewed on 2018 02:35 PM by Deloris Guerrier        Marijuana: Current use. couple times  a month         Mental Health History:     Last Reviewed on 2018 02:35 PM by Deloris Guerrier        Communicable Diseases (eg STDs):     Last Reviewed on 2018 02:35 PM by Deloris Guerrier            Allergies:     Last Reviewed on 3/18/2019 08:48 AM by Cathy Ramirez      No Known Drug Allergies.         Current Medications:     Last Reviewed on 3/18/2019 08:51 AM by Cathy Ramirez    Atorvastatin Calcium 20mg Tablet 1 tab daily     Trazodone HCl 100mg Tablet 1 tab HS     FreeStyle Lancets  Lancet Check BS 1time daily DX :E11.9     Glucose Reagent Blood Test Strips  Reagent Strips Freestyle Lite check BS QD     Amlodipine  10mg Tablet 1 tab daily     Clonidine HCl 0.2mg Tablets Take 1 tablet(s) by mouth  "bid     Januvia 100mg Tablet Take 1 tablet(s) by mouth daily     Lisinopril/Hydrochlorothiazide 20mg/12.5mg Tablet 1 bid     Metformin HCl 1,000mg Tablet One PO BID.     Potassium Chloride 10mEq Tablets, Extended Release Take 1 tablet(s) by mouth daily     Mobic 15mg Tablet 1 tab daily     Combigan 0.2%/0.5% Ophthalmic Solution 1 drop each eye BID     CPAP machine         OBJECTIVE:        Vitals:         Current: 4/13/2019 12:21:22 PM    Ht:  5 ft, 11 in;  Wt: 255 lbs;  BMI: 35.6    T: 97.8 F (oral);  BP: 135/90 mm Hg (right arm, sitting);  P: 102 bpm (right arm (BP Cuff), sitting);  sCr: 0.9 mg/dL;  GFR: 95.51        Exams:     PHYSICAL EXAM:     GENERAL:  well developed and nourished; appropriately groomed; in no apparent distress;     EYES: Nonicteric and with unremarkable lids, iris and pupils;     E/N/T:  normal EACs, TMs, nasal/oral mucosa, teeth, gingiva, and oropharynx; Small pharyngeal opening He says he does have sleep apnea     NECK: carotid exam reveals no bruits;     RESPIRATORY: normal respiratory rate and pattern with no distress; no rales (\"crackles\") present; expiratory wheezes in the End of expiration.  Mild.;     CARDIOVASCULAR: normal rate; rhythm is regular;  no systolic murmur;     LYMPHATIC: no enlargement of cervical or facial nodes;     MUSCULOSKELETAL: normal overall tone No pedal edema.;     NEUROLOGIC: No lateralizing deficit.;     PSYCHIATRIC:  appropriate affect and demeanor; normal speech pattern; grossly normal memory;         ASSESSMENT           465.8   J06.9  URI              DDx:     110.3   B35.6  Tinea cruris              DDx:     250.00   E11.9  Type 2 diabetes              DDx:         ORDERS:         Meds Prescribed:       Ventolin HFA (Albuterol) 90mcg/1actuation Oral Inhaler Inhale 2 puff(s) by mouth 4 times a day as needed  #1 (One) inhaler(s) Refills: 0       Cefuroxime Axetil 500mg Tablet Take 1 tablet(s) by mouth bid  #14 (Fourteen) tablet(s) Refills: 0       Diflucan " (Fluconazole) 100mg Tablet Take 1 tablet(s) by mouth daily  #14 (Fourteen) tablet(s) Refills: 0                 PLAN:          URI           Prescriptions:       Ventolin HFA (Albuterol) 90mcg/1actuation Oral Inhaler Inhale 2 puff(s) by mouth 4 times a day as needed  #1 (One) inhaler(s) Refills: 0       Cefuroxime Axetil 500mg Tablet Take 1 tablet(s) by mouth bid  #14 (Fourteen) tablet(s) Refills: 0          Tinea cruris Cautioned him about the drug interaction between Diflucan and atorvastatin           Prescriptions:       Diflucan (Fluconazole) 100mg Tablet Take 1 tablet(s) by mouth daily  #14 (Fourteen) tablet(s) Refills: 0             Patient Recommendations:    Dragon transcription disclaimer:        Much of this encounter note is an electronic transcription/translation of spoken language to printed text.  The electronic translation of spoken language may permit erroneous, or at times, nonsensical words or phrases to be inadvertently transcribed.  Although I have reviewed the note for such errors, some may still exist.             CHARGE CAPTURE           **Please note: ICD descriptions below are intended for billing purposes only and may not represent clinical diagnoses**        Primary Diagnosis:         465.8 URI            J06.9    Acute upper respiratory infection, unspecified              Orders:          52178   Office/outpatient visit; established patient, level 4  (In-House)           110.3 Tinea cruris            B35.6    Tinea cruris    250.00 Type 2 diabetes            E11.9    Type 2 diabetes mellitus without complications

## 2021-05-18 NOTE — PROGRESS NOTES
Maksim Acosta 1955     Office/Outpatient Visit    Visit Date: Tue, Sep 10, 2019 10:14 am    Provider: Yolanda Marie N.P. (Assistant: Sherri Stephen MA)    Location: [facility]        Electronically signed by Yolanda Marie N.P. on  09/18/2019 04:46:59 PM                             SUBJECTIVE:        CC:     Mr. Acosta is a 64 year old Black or  male.  This is a follow-up visit.          HPI:         Patient presents with type 2 diabetes.  Current meds include an oral hypoglycemic ( Glucophage ( 1000mg bid ) and Januvia ).  He reports home blood glucose readings have averaged fasting readings in the 189-274 mg/dL range.  Most recent lab results include Hemoglobin A1c:  8.6 (%) (08/19/2019), LDL:  52 (mg/dL) (08/19/2019), HDL:  52 (mg/dL) (08/19/2019), Triglycerides:  137 (mg/dL) (08/19/2019), Microalbuminuria:  11.4 (mg/g creat) (03/18/2019), Dilated Eye Exam by date:  08/30/2019 (08/19/2019), Foot Exam (Annual):  03/18/2019 (03/18/2019).      ROS:     CONSTITUTIONAL:  Positive for unintentional weight gain.      EYES:  Negative for blurred vision.      CARDIOVASCULAR:  Negative for chest pain, palpitations, tachycardia, orthopnea, and edema.      RESPIRATORY:  Positive for wheezing ( occ ).  has had bronchitis earlier this year, did smoke in the past     GENITOURINARY:  Positive for urinary frequency.   Negative for hematuria.      INTEGUMENTARY:  Positive for rash on his hands has flared.      NEUROLOGICAL:  Negative for dizziness, headaches, paresthesias, and weakness.          PMH/FMH/SH:     Last Reviewed on 9/10/2019 10:36 AM by Yolanda Marie    Past Medical History:         Hyperlipidemia    Hypertension     Sleep Apnea: (+) sleep study; uses CPAP;     Chronic Pain: affecting the low back;     Type 2 Diabetes: dx'd in 2010;     Glaucoma: dx'd in 2009;         PREVENTIVE HEALTH MAINTENANCE             COLORECTAL CANCER SCREENING: Up to date (colonoscopy q10y;  sigmoidoscopy q5y; Cologuard q3y) was last done 19, Results are in chart; colonoscopy with the following abnormalities noted-- Diverticulosis; 2014, pt. has consultation scheduled in May     EYE EXAM: Diabetic Eye Exam during this calendar year and results are in chart was last done 19     Hepatitis C Medicare Screening: was last done 17; negative     PSA: was last done 3/18/19 with normal results         PAST MEDICAL HISTORY             CURRENT MEDICAL PROVIDERS:    Dentist: Dr. Cabrera    Neurologist: Sushila Barclay    Ophthalmologist: Dr. Bowling             ADVANCED DIRECTIVES: None         Family History:     Father:  at age 83;  Dementia;  Type 2 Diabetes     Mother:  at age 51; Cause of death was CVA     Brother(s): 3 brother(s) total; 2 ;  Myocardial Infarction (  71 );  cancer of pharynx     Sister(s): 2 sister(s) total; 2 ;   at birth     Daughter(s): 2 daughter(s) total         Social History:     Occupation: Disabled (due to low back pain)     Marital Status:      Children: 2 children         Tobacco/Alcohol/Supplements:     Last Reviewed on 9/10/2019 10:17 AM by Sherri Stephen    Tobacco: He has a past history of cigarette smoking; quit date:  .  Non-drinker             Immunizations:     Havrix -adult dose (HepA) 2018     Hep A, adult dose 3/19/2019     Fluarix pf 9/10/2013     Fluarix pf 2015     Fluarix pf 2019     Fluvirin (4 + years dose) 2016     Fluvirin (4 + years dose) 2017     Fluzone (3 + years dose) 9/10/2012     Fluzone (3 + years dose) 2018     Fluzone pf (3+ years dose) 10/13/2011     PNEUMOVAX 23 (Pneumococcal PPV23) 10/13/2011     Adacel (Tdap) 3/19/2019     Shingrix (Zoster recombinant) 3/19/2019     Shingrix (Zoster recombinant) 2019     Zostavax (Zoster live) 2015         Allergies:     Last Reviewed on 9/10/2019 10:17 AM by Sherri Stephen      No Known Drug Allergies.          Current Medications:     Last Reviewed on 9/10/2019 10:18 AM by Sherri Stephen    Trazodone HCl 100mg Tablet 1 tab HS     Amlodipine  10mg Tablet 1 tab daily     Atorvastatin Calcium 20mg Tablet 1 tab daily     Clonidine HCl 0.2mg Tablets Take 1 tablet(s) by mouth bid     Januvia 100mg Tablet Take 1 tablet(s) by mouth daily     Lisinopril/Hydrochlorothiazide 20mg/12.5mg Tablet 1 bid     Metformin HCl 1,000mg Tablet One PO BID.     Potassium Chloride 10mEq Tablets, Extended Release Take 1 tablet(s) by mouth daily     Mobic 15mg Tablet 1 tab daily     FreeStyle Lancets  Lancet Check BS 1time daily DX :E11.9     Glucose Reagent Blood Test Strips  Reagent Strips Freestyle Lite check BS QD     multivitamin     Combigan 0.2%/0.5% Ophthalmic Solution 1 drop each eye BID     CPAP machine         OBJECTIVE:        Vitals:         Current: 9/10/2019 10:21:03 AM    Ht:  5 ft, 11 in;  Wt: 263 lbs;  BMI: 36.7    T: 97.4 F (oral);  BP: 131/89 mm Hg (left arm, sitting);  P: 97 bpm (left arm (BP Cuff), sitting);  sCr: 0.93 mg/dL;  GFR: 92.48        Exams:     PHYSICAL EXAM:     GENERAL: Vitals recorded well developed, well nourished;  well groomed;  no apparent distress;     NECK: carotid exam reveals no bruits;     RESPIRATORY: normal respiratory rate and pattern with no distress; inspiratory wheezes in the left mid-lung field and right mid-lung field; heard faintly     CARDIOVASCULAR: normal rate; rhythm is regular;  no systolic murmur; no edema;     SKIN: a rash is noted on the palms;  the color is mainly light;  it is best characterized as scaling;     PSYCHIATRIC:  appropriate affect and demeanor; normal speech pattern; grossly normal memory;         ASSESSMENT           250.02   E11.65  Type 2 diabetes, uncontrolled              DDx:     272.4   E78.5  Hyperlipidemia              DDx:     401.1   I10  Hypertension              DDx:     782.1   R21  Rash              DDx:     780.57   G47.33  JOYCE              DDx:      780.93   R41.89  Memory loss NOS              DDx:         ORDERS:         Meds Prescribed:       Refill of: Triamcinolone Acetonide (Triamcinolone Acetonide)  0.1% Topical Ointment Apply thin film to affected area bid  eczema  #1 (One) 80 gm tube Refills: 1       Refill of: Trulicity PEN (Dulaglutide) 0.75mg/0.5ml Injection inject once weekly x 4 weeks, then increase to 1.5mg dose weekly  #4 (Four) prefilled pen Refills: 0         Radiology/Test Orders:       2022F  Dilated retinal eye exam w/interpret by ophthalmologist/optometrist documented/reviewed (DM)4  (In-House)         3017F  Colorectal CA screen results documented and reviewed (PV)  (In-House)           Procedures Ordered:       REFER  Referral to Specialist or Other Facility  (Send-Out)           Other Orders:         Initiating visit for Comprehensive Care Management  (In-House)                   PLAN:          Type 2 diabetes, uncontrolled reviewed recent labs, vital signs, and glucose log, he brings in his new meter, accucheck, continue januvia and metformin, will add trulicity         RECOMMENDATIONS given include: Continue with chronic visit follow up.  Plan of care review today and extensive time spent with patient due to chronic conditions.  [facility]IPS Colorectal Cancer Screening is up to date and the results are in the chart   Diabetic Eye Exam during this calendar year and results are in chart     FOLLOW-UP: Schedule a follow-up visit in 3 months.            Prescriptions:       Refill of: Trulicity PEN (Dulaglutide) 0.75mg/0.5ml Injection inject once weekly x 4 weeks, then increase to 1.5mg dose weekly  #4 (Four) prefilled pen Refills: 0           Orders:         Initiating visit for Comprehensive Care Management  (In-House)         2022F  Dilated retinal eye exam w/interpret by ophthalmologist/optometrist documented/reviewed (DM)4  (In-House)         3017F  Colorectal CA screen results documented and reviewed (PV)  (In-House)             Hyperlipidemia on atorvastain / lipids at goal          Hypertension continue amolodipine, clonidine, ACE/HCTZ          Rash reviewed derm note from 6-2018, given triamcinalone cr, dg eczema, will send that rx in again, if not improving follow up, consider derm referral again if needed           Prescriptions:       Refill of: Triamcinolone Acetonide (Triamcinolone Acetonide)  0.1% Topical Ointment Apply thin film to affected area bid  eczema  #1 (One) 80 gm tube Refills: 1          JOYCE         REFERRALS:  Referral initiated to a pulmonologist ( for evaluation of JOYCE ).            Orders:       REFER  Referral to Specialist or Other Facility  (Send-Out)            Memory loss NOS sent for records from neurology             Patient Recommendations:        For  Type 2 diabetes, uncontrolled:     Schedule a follow-up visit in 3 months.              CHARGE CAPTURE           **Please note: ICD descriptions below are intended for billing purposes only and may not represent clinical diagnoses**        Primary Diagnosis:         250.02 Type 2 diabetes, uncontrolled            E11.65    Type 2 diabetes mellitus with hyperglycemia              Orders:          98704   Office/outpatient visit; established patient, level 4  (In-House)                Initiating visit for Comprehensive Care Management  (In-House)             2022F   Dilated retinal eye exam w/interpret by ophthalmologist/optometrist documented/reviewed (DM)4  (In-House)             3017F   Colorectal CA screen results documented and reviewed (PV)  (In-House)           272.4 Hyperlipidemia            E78.5    Hyperlipidemia, unspecified    401.1 Hypertension            I10    Essential (primary) hypertension    782.1 Rash            R21    Rash and other nonspecific skin eruption    780.57 JOYCE            G47.33    Obstructive sleep apnea (adult) (pediatric)    780.93 Memory loss NOS            R41.89    Other symptoms and signs involving cognitive functions  and awareness

## 2021-05-18 NOTE — PROGRESS NOTES
Maksim Acosta  1955     Office/Outpatient Visit    Visit Date: Wed, Mar 24, 2021 08:49 am    Provider: Yolanda Marie N.P. (Assistant: Dhara Lilly MA)    Location: [facility]        Electronically signed by Yolanda Marie N.P. on  03/24/2021 12:53:50 PM                             Subjective:        CC: Mr. Acosta is a 65 year old Black or  male.  This is a follow-up visit.  check up         HPI:           Patient presents with type 2 diabetes mellitus without complications.  Compliance with treatment has been good; he takes his medication as directed.  Current meds include an oral hypoglycemic ( Glucophage ( 1000mg bid ) and Januvia ( 100 mg QD ) ) and insulin/injectable ( Trulicity- 1.5 ).  He reports home blood glucose readings have showed poor control, with average fasting readings in the high-200s mg/dL range.  Most recent lab results include Hemoglobin A1c:  11.2 (%) (11/10/2020), LDL:  52 (mg/dL) (11/10/2020), HDL:  45 (mg/dL) (11/10/2020), Triglycerides:  148 (mg/dL) (11/10/2020), Microalbuminuria:  6.6 (mg/g creat) (05/07/2020), Dilated Eye Exam by date:  10/27/2020 (11/10/2020), Foot Exam (Annual):  11/10/2020 (11/10/2020).  Just realized that he was drinking concentrated orange juice which is high sugar. Has only stopped a few days ago.          Dx with hyperlipidemia, unspecified; current treatment includes Lipitor.  Compliance with treatment has been good; he takes his medication as directed.  He denies experiencing any hypercholesterolemia related symptoms.            Additionally, he presents with history of essential (primary) hypertension.  his current cardiac medication regimen includes a calcium channel blocker ( Norvasc ), an adrenergic inhibitor ( Catapres ), and a combination medication ( Zestoretic ).  He did not bring his blood pressure diary, but says that typical readings show systolics in the <120 and diastolics in the 80s.  He is  tolerating the medication well without side effects.  Compliance with treatment has been good; he takes his medication as directed.      ROS:     CONSTITUTIONAL:  Negative for fever.  has worked on diet some and exercise, lost over 10 pounds     CARDIOVASCULAR:  Negative for chest pain, palpitations, tachycardia, orthopnea, and edema.      RESPIRATORY:  Negative for recent cough and dyspnea.      GASTROINTESTINAL:  Negative for abdominal pain, heartburn, constipation, diarrhea, and stool changes.      MUSCULOSKELETAL:  Positive for arthralgias (takes mobic).  helps some     NEUROLOGICAL:  Positive for dizziness ( with positional changes ).  asked about being taken off clonidine     PSYCHIATRIC:  Positive for insomnia (takes trazodone).  off THC over a year         Past Medical History / Family History / Social History:         Last Reviewed on 3/24/2021 09:13 AM by Yolanda Marie    Past Medical History:         Hyperlipidemia    Hypertension     Sleep Apnea: (+) sleep study; uses CPAP;     Chronic Pain: affecting the low back;     Type 2 Diabetes: dx'd in 2010;     Glaucoma: dx'd in 2009;         PREVENTIVE HEALTH MAINTENANCE             COLORECTAL CANCER SCREENING: Up to date (colonoscopy q10y; sigmoidoscopy q5y; Cologuard q3y) was last done 7-29-19, Results are in chart; colonoscopy with the following abnormalities noted-- Diverticulosis; The next colonoscopy is due  2024; 03-, pt. has consultation scheduled in May     EYE EXAM: Diabetic Eye Exam during this calendar year and results are in chart was last done 8/30/19     Hepatitis C Medicare Screening: was last done 5-16-17; negative     PSA: was last done 3/18/19 with normal results         PAST MEDICAL HISTORY             CURRENT MEDICAL PROVIDERS:    Dentist: Dr. Cabrera    Neurologist: Sushila Barclay    Ophthalmologist: Dr. Bowling    Pulmonologist: Dr. Fischer - [place]             ADVANCED DIRECTIVES: None         Surgical History:          Procedures:    Colonoscopy         Family History:     Father:  at age 83;  Dementia;  Type 2 Diabetes     Mother:  at age 51; Cause of death was CVA     Brother(s): 3 brother(s) total; 2 ;  Myocardial Infarction (  71 );  cancer of pharynx     Sister(s): 2 sister(s) total; 2 ;   at birth     Daughter(s): 2 daughter(s) total         Social History:     Occupation: Disabled (due to low back pain)     Marital Status:      Children: 2 children         Tobacco/Alcohol/Supplements:     Last Reviewed on 11/10/2020 08:34 AM by Alejandra Rodriguez    Tobacco: He has a past history of cigarette smoking; quit date:  .  Non-drinker         Immunizations:     COVID-19, mRNA, LNP-S, PF, 30 mcg/0.3 mL dose 3/2/2021    influenza, trivalent, adjuvanted 2020    Havrix -adult dose (HepA) 2018    Hep A, adult dose 3/19/2019    Fluarix pf 9/10/2013    Fluarix pf 2015    Fluarix pf 2019    Fluvirin (4 + years dose) 2016    Fluvirin (4 + years dose) 2017    Fluzone (3 + years dose) 9/10/2012    Fluzone (3 + years dose) 2018    Fluzone pf (3+ years dose) 10/13/2011    PNEUMOVAX 23 (Pneumococcal PPV23) 10/13/2011    Adacel (Tdap) 3/19/2019    Shingrix (Zoster recombinant) 3/19/2019    Shingrix (Zoster recombinant) 2019    Zostavax (Zoster live) 2015    SARS-COV-2 (COVID-19) vaccine, UNSPECIFIED 2021    SARS-COV-2 (COVID-19) vaccine, UNSPECIFIED 3/15/2021        Allergies:     Last Reviewed on 3/24/2021 08:53 AM by Dhara Lilly    No Known Allergies.        Current Medications:     Last Reviewed on 3/24/2021 08:54 AM by Dhara Lilyl    CPAP machine     traZODone 100 mg oral tablet [TAKE 1 TABLET BY MOUTH AT BEDTIME]    lisinopriL-hydrochlorothiazide 20-12.5 mg oral tablet [Take 1 tablet by mouth twice daily]    amLODIPine 10 mg oral tablet [Take 1 tablet by mouth once daily]    Januvia 100 mg oral tablet [Take 1 tablet by mouth once  daily]    metFORMIN 1,000 mg oral tablet [Take 1 tablet by mouth twice daily]    FreeStyle Lancets  Lancet [Check BS 1time daily DX :E11.9]    Combigan 0.2-0.5 % ophthalmic (eye) Drops [1 drop each eye BID]    Mobic 15 mg oral tablet [TAKE 1 TABLET BY MOUTH ONCE DAILY WITH FOOD (NEED  APPOINTMENT)]    cloNIDine HCl 0.2 mg oral tablet [Take 1 tablet by mouth twice daily]    aspirin 81 mg oral tablet, delayed release (enteric coated) [Take 1 tablet by mouth once daily]    blood sugar diagnostic strips  [USE TO CHECK GLUCOSE ONCE OR TWICE DAILY  DX: E11.9]    potassium chloride 10 mEq oral tablet, extended release [Take 1 tablet by mouth twice daily]    atorvastatin 20 mg oral tablet [Take 1 tablet by mouth once daily]    multivitamin     Trulicity 1.5 mg/0.5 mL subcutaneous Pen Injector [INJECT 1.5MG (0.5ML) SUBCUTANEOUSLY ONCE WEEKLY]    lancets  [Check blood sugar one to two times daily. Dx: E11.9]        Objective:        Vitals:         Current: 3/24/2021 8:55:36 AM    Ht:  5 ft, 11 in;  Wt: 247 lbs;  BMI: 34.4T: 97.5 F (temporal);  BP: 138/95 mm Hg (right arm, sitting);  P: 100 bpm (right arm (BP Cuff), sitting);  sCr: 1.1 mg/dL;  GFR: 75.16        Repeat:     9:21:25 AM  BP:   131/90mm Hg (right arm, sitting, HR: 100)     Exams:     PHYSICAL EXAM:     GENERAL: Vitals recorded well developed, well nourished;  well groomed;  no apparent distress;     NECK: carotid exam reveals no bruits;     RESPIRATORY: normal respiratory rate and pattern with no distress; normal breath sounds with no rales, rhonchi, wheezes or rubs;     CARDIOVASCULAR: normal rate; rhythm is regular;  no systolic murmur; no edema;     NEUROLOGIC: GROSSLY INTACT     PSYCHIATRIC:  appropriate affect and demeanor; normal speech pattern; grossly normal memory;         Assessment:         E11.9   Type 2 diabetes mellitus without complications       E78.5   Hyperlipidemia, unspecified       I10   Essential (primary) hypertension       G47.00    Insomnia, unspecified       M54.5   Low back pain       Z12.5   Encounter for screening for malignant neoplasm of prostate           ORDERS:         Lab Orders:       [id number]  DIAB1 - H LIPID,CMP, A1C: 37642, 49683, 47063  (Send-Out)            *  PRSAS Medicare screening PSA  (Send-Out)                      Plan:         Type 2 diabetes mellitus without complicationsdoes not need refills today, reviewed diabetes flow sheet    LABORATORY:  Labs ordered to be performed today include Diabetes Panel 1; CMP, Lipid, A1C.      RECOMMENDATIONS: continue home monitoring, sugars are too high, to make changes in diet, continue regular exercise.      FOLLOW-UP: pending labs           Orders:       [id number]  DIAB1 - [facility] LIPID,CMP, A1C: 88238, 96269, 76377  (Send-Out)              Hyperlipidemia, unspecifiedcontinue lipitor         RECOMMENDATIONS given include: exercise, low cholesterol/low fat diet, and weight loss.          Essential (primary) hypertensionmay make a change in BP rx, clonidine after labs         RECOMMENDATIONS given include: perform routine monitoring of blood pressure with home blood pressure cuff and reduction of dietary salt intake.          Insomnia, unspecifiedcontinue trazodone         Low back paincontinue mobic         Encounter for screening for malignant neoplasm of prostatelast PSA normal 3-2019    LABORATORY:  Labs ordered to be performed today include PSA Screening Medicare patients.            Orders:       *  PRSAS Medicare screening PSA  (Send-Out)                  Patient Recommendations:        For  Hyperlipidemia, unspecified:    Maintain a regular exercise program. Reduce the amount of cholesterol and saturated fat in your diet. Try to lose some weight; even modest weight reduction can improve your blood pressure.          For  Essential (primary) hypertension:    Begin monitoring your blood pressure by brief nurse visits at our office, a home blood pressure monitor, or by checking  on the machines in pharmacies or stores.  Keep a log of the readings. Reduce the amount of salt in your diet.              Charge Capture:         Primary Diagnosis:     E11.9  Type 2 diabetes mellitus without complications           Orders:      [id number]  Office/outpatient visit; established patient, level 4  (In-House)              E78.5  Hyperlipidemia, unspecified     I10  Essential (primary) hypertension     G47.00  Insomnia, unspecified     M54.5  Low back pain     Z12.5  Encounter for screening for malignant neoplasm of prostate

## 2021-05-18 NOTE — PROGRESS NOTES
Maksim Acosta 1955     Office/Outpatient Visit    Visit Date: Thu, May 23, 2019 01:49 pm    Provider: Coby Cardona N.P. (Assistant: Melissa Cade MA)    Location: [facility]        Electronically signed by Coby Cardona N.P. on  05/23/2019 02:41:33 PM                             SUBJECTIVE:        CC:     Mr. Acosta is a 63 year old Black or  male.  presents today due to complaints of cough and wheezing X 1 month         HPI:         Patient to be evaluated for upper respiratory illness.  These have been present for the past 4 weeks.  The symptoms include chest congestion, productive cough, wheezing and mild shortness of breath.  He has already tried to relieve the symptoms with Ceftin, Levaquin, Prednisone.  Chest xray 5/3/19 was normal.      ROS:     CONSTITUTIONAL:  Negative for chills and fever.      EYES:  Negative for blurred vision and eye drainage.      E/N/T:  Negative for ear pain and sore throat.      CARDIOVASCULAR:  Negative for chest pain and palpitations.      RESPIRATORY:  Positive for recent cough ( with scant amounts of clear or white sputum ), dyspnea ( mild ) and frequent wheezing.          PMH/FMH/SH:     Last Reviewed on 5/03/2019 02:19 PM by Obdulia Hoyos    Past Medical History:         Hyperlipidemia    Hypertension     Sleep Apnea: (+) sleep study; uses CPAP;     Chronic Pain: affecting the low back;     Type 2 Diabetes: dx'd in 2010;     Glaucoma: dx'd in 2009;         PAST MEDICAL HISTORY                 ADVANCED DIRECTIVES: None         PREVENTIVE HEALTH MAINTENANCE             COLORECTAL CANCER SCREENING:; 03-, pt. has consultation scheduled in May     Hepatitis C Medicare Screening: was last done 5-16-17; negative     PSA: was last done 03/13/2018 with normal results         PAST MEDICAL HISTORY             CURRENT MEDICAL PROVIDERS:    Dentist: Dr. Jacobo    Ophthalmologist: Dr. Bowling    Urologist: Dr. Wiley         Surgical  History:         Procedures:    Colonoscopy ( 3-/Dany (has had polyps)due follow up in 2019 )         Family History:     Father:  at age 83;  Dementia;  Type 2 Diabetes     Mother:  at age 51; Cause of death was CVA     Brother(s): 3 brother(s) total; 2 ;  Myocardial Infarction (  71 );  cancer of pharynx     Sister(s): 2 sister(s) total; 2 ;   at birth     Daughter(s): 2 daughter(s) total         Social History:     Occupation: Disabled (due to low back pain)     Marital Status:      Children: 2 children         Tobacco/Alcohol/Supplements:     Last Reviewed on 2019 02:19 PM by Obdulia Hoyos    Tobacco: He has never smoked.  Non-drinker         Substance Abuse History:     Last Reviewed on 2019 02:19 PM by Obdulia Hoyos        Marijuana: Current use. couple times  a month         Mental Health History:     Last Reviewed on 2019 02:19 PM by Obdulia Hoyos        Communicable Diseases (eg STDs):     Last Reviewed on 2019 02:19 PM by Obdulia Hoyos            Current Problems:     Last Reviewed on 2019 02:19 PM by Obdulia Hoyos    History of colonic polyps     Joint pain, other specified site     Memory loss NOS     Psoriasis     Acanthosis nigricans     Low back pain     Hyperlipidemia     Hypertension     Type 2 diabetes     Acute bronchitis     Tinea cruris     URI     Insomnia         Immunizations:     Havrix -adult dose (HepA) 2018     Hep A, adult dose 3/19/2019     Fluarix pf 9/10/2013     Fluarix pf 2015     Fluvirin (4 + years dose) 2016     Fluvirin (4 + years dose) 2017     Fluzone (3 + years dose) 9/10/2012     Fluzone (3 + years dose) 2018     Fluzone pf (3+ years dose) 10/13/2011     PNEUMOVAX 23 (Pneumococcal PPV23) 10/13/2011     Adacel (Tdap) 3/19/2019     Shingrix (Zoster recombinant) 3/19/2019     Zostavax (Zoster live) 2015         Allergies:     Last Reviewed on 2019  02:19 PM by Obdulia Hoyos      No Known Drug Allergies.         Current Medications:     Last Reviewed on 5/03/2019 02:19 PM by Obdulia Hoyos    FreeStyle Lancets  Lancet Check BS 1time daily DX :E11.9     Glucose Reagent Blood Test Strips  Reagent Strips Freestyle Lite check BS QD     Clonidine HCl 0.2mg Tablets Take 1 tablet(s) by mouth bid     Ventolin HFA 90mcg/1actuation Oral Inhaler Inhale 2 puff(s) by mouth 4 times a day as needed     Atorvastatin Calcium 20mg Tablet 1 tab daily     Trazodone HCl 100mg Tablet 1 tab HS     Amlodipine  10mg Tablet 1 tab daily     Januvia 100mg Tablet Take 1 tablet(s) by mouth daily     Lisinopril/Hydrochlorothiazide 20mg/12.5mg Tablet 1 bid     Metformin HCl 1,000mg Tablet One PO BID.     Potassium Chloride 10mEq Tablets, Extended Release Take 1 tablet(s) by mouth daily     Prednisone 20mg Tablet 2 PO daily x 5 days     Mobic 15mg Tablet 1 tab daily     Combigan 0.2%/0.5% Ophthalmic Solution 1 drop each eye BID     CPAP machine         OBJECTIVE:        Vitals:         Current: 5/23/2019 1:54:36 PM    Ht:  5 ft, 11 in;  Wt: 253.6 lbs;  BMI: 35.4    T: 98.1 F (oral);  BP: 135/96 mm Hg (right arm, sitting);  P: 92 bpm (right arm (BP Cuff), sitting);  sCr: 0.9 mg/dL;  GFR: 95.29    O2 Sat: 98 % (room air)        Exams:     PHYSICAL EXAM:     GENERAL: well developed, well nourished;  no apparent distress;     EYES: PERRL, EOMI     E/N/T: EARS: external auditory canal normal;  both TMs are dull;  NOSE: normal turbinates; no sinus tenderness; OROPHARYNX: oral mucosa is normal; posterior pharynx shows no exudate and post nasal drip;     NECK: range of motion is normal; trachea is midline;     RESPIRATORY: normal appearance and symmetric expansion of chest wall; normal respiratory rate and pattern with no distress; diffuse expiratory wheezes;     CARDIOVASCULAR: normal rate; rhythm is regular;     NEUROLOGIC: mental status: alert and oriented x 3; GROSSLY INTACT      PSYCHIATRIC: appropriate affect and demeanor;         Procedures:     Acute bronchitis         Nebulizer: medication: albuterol 1 treatments were performed on patient. Pre-treatment Pulse: 87; O2 Sat: 97 Post-treatment Pulse: 94; O2 Sat: 97 Lot#[id number]  Expiration:9/20 Was treatment tolerated? yes; Administered by:[facility]             ASSESSMENT           466.0   J20.8  Acute bronchitis              DDx:         ORDERS:         Meds Prescribed:       Albuterol 0.083% Nebulizer Solution 1 vial q 4 hours prn  #100 (One Landrum) vial(s) Refills: 0       Nebulizer tubing* 1 Use along with nebulizer treatment  #1 (One) tube Refills: 1       Nebulizer* as directed COPD  #1 (One) unit(s) Refills: 0       Promethazine with Dextromethrophan 6.25mg/15mg per 5ml Syrup 1 tsp p.o. q 4-6 hrs. prn cough/nausea  #4 (Four) oz Refills: 0         Procedures Ordered:       [id number]  inhalation treatment for acute airway obstruction or for sputum induction for diagnostic purposes; e  (In-House)           Other Orders:       [id number]  Noninvasive ear or pulse oximetry for oxygen saturation; single determination  (In-House)           Albuterol, inhale soln, FDA-apprvd final, non-compounded, admin thru DME, unit dose 1 mg (x2.5)                 PLAN:          Acute bronchitis Reports improvement with Albuterol neb treatment. Follow up if not improving.         TESTS/PROCEDURES:  Will proceed with Inhalation Treatment Albuterol 0.083% unit dose (units 2.5) to be performed/scheduled now.      RECOMMENDATIONS given include: Push Fluids, Rest, Follow up if no improvement or worsening symptoms like high fevers, vomiting, weakness, or increasing shortness of air.    .      FOLLOW-UP: Chronic visit follow up           Prescriptions: Advised that cough medication may cause drowsiness. May take Robitussin during the day per package instructions.       Albuterol 0.083% Nebulizer Solution 1 vial q 4 hours prn  #100 (One Landrum) vial(s) Refills: 0        Nebulizer tubing* 1 Use along with nebulizer treatment  #1 (One) tube Refills: 1       Nebulizer* as directed COPD  #1 (One) unit(s) Refills: 0       Promethazine with Dextromethrophan 6.25mg/15mg per 5ml Syrup 1 tsp p.o. q 4-6 hrs. prn cough/nausea  #4 (Four) oz Refills: 0           Orders:       [id number]  Noninvasive ear or pulse oximetry for oxygen saturation; single determination  (In-House)         18544  inhalation treatment for acute airway obstruction or for sputum induction for diagnostic purposes; e  (In-House)                     Albuterol, inhale soln, FDA-apprvd final, non-compounded, admin thru DME, unit dose 1 mg (x2.5)             CHARGE CAPTURE           **Please note: ICD descriptions below are intended for billing purposes only and may not represent clinical diagnoses**        Primary Diagnosis:         466.0 Acute bronchitis            J20.8    Acute bronchitis due to other specified organisms              Orders:          [id number]   Office/outpatient visit; established patient, level 3  (In-House)             64325   Noninvasive ear or pulse oximetry for oxygen saturation; single determination  (In-House)             35738   inhalation treatment for acute airway obstruction or for sputum induction for diagnostic purposes; e  (In-House)                                           Albuterol, inhale soln, FDA-apprvd final, non-compounded, admin thru DME, unit dose 1 mg (x2.5)

## 2021-05-27 ENCOUNTER — OFFICE VISIT CONVERTED (OUTPATIENT)
Dept: FAMILY MEDICINE CLINIC | Age: 66
End: 2021-05-27
Attending: NURSE PRACTITIONER
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
## 2021-06-07 ENCOUNTER — TELEPHONE (OUTPATIENT)
Dept: FAMILY MEDICINE CLINIC | Age: 66
End: 2021-06-07

## 2021-06-07 DIAGNOSIS — M79.605 DIFFUSE PAIN IN LEFT LOWER EXTREMITY: Primary | ICD-10-CM

## 2021-06-07 NOTE — TELEPHONE ENCOUNTER
Spoke to pt re: his hip pain, informed that he needs to speak to his ortho about it but he isnt following up with them until August, you had also said he could try Gabapentin for nerve pain.  He is agreeable to that.  He uses [facility]

## 2021-06-07 NOTE — TELEPHONE ENCOUNTER
Caller: Maksim Acosta    Relationship to patient: Self    Best call back number: [phone]    Patient is needing: PATIENT CALLED IN AGAIN AND STATED THAT HE NEEDS PAIN MEDICATION PRESCRIBED NOT FOR HIS HIP, BUT FOR HIS LEFT LEG BELOW THE HIP. PATIENT STATED THAT HE IS HAVING PAIN ON THE SIDE OF HIS LEG. PLEASE CALL PATIENT AND ADVISE OR GIVE NEXT STEPS.

## 2021-06-09 RX ORDER — GABAPENTIN 100 MG/1
100 CAPSULE ORAL 3 TIMES DAILY
Qty: 90 CAPSULE | Refills: 0 | Status: SHIPPED | OUTPATIENT
Start: 2021-06-09 | End: 2021-10-08

## 2021-06-24 NOTE — TELEPHONE ENCOUNTER
[facility] pharmacy is requesting a refill of Metformin 1000mg one bid, LV- 03/24/21, LF- 03/18/21, #180, Last dm panel 1 03/24/21.

## 2021-07-01 ENCOUNTER — OFFICE VISIT (OUTPATIENT)
Dept: URBAN - METROPOLITAN AREA CLINIC 71 | Facility: CLINIC | Age: 66
End: 2021-07-01
Payer: COMMERCIAL

## 2021-07-01 VITALS
TEMPERATURE: 98 F | DIASTOLIC BLOOD PRESSURE: 87 MMHG | HEART RATE: 78 BPM | SYSTOLIC BLOOD PRESSURE: 123 MMHG | BODY MASS INDEX: 36.99 KG/M2 | HEIGHT: 71 IN | WEIGHT: 264.2 LBS

## 2021-07-01 VITALS
WEIGHT: 252.2 LBS | HEART RATE: 105 BPM | DIASTOLIC BLOOD PRESSURE: 87 MMHG | HEIGHT: 71 IN | BODY MASS INDEX: 35.31 KG/M2 | SYSTOLIC BLOOD PRESSURE: 125 MMHG | TEMPERATURE: 97.8 F

## 2021-07-01 VITALS
DIASTOLIC BLOOD PRESSURE: 89 MMHG | WEIGHT: 263 LBS | HEART RATE: 97 BPM | HEIGHT: 71 IN | BODY MASS INDEX: 36.82 KG/M2 | TEMPERATURE: 97.4 F | SYSTOLIC BLOOD PRESSURE: 131 MMHG

## 2021-07-01 VITALS
WEIGHT: 251.4 LBS | HEART RATE: 90 BPM | TEMPERATURE: 98.2 F | HEIGHT: 71 IN | SYSTOLIC BLOOD PRESSURE: 120 MMHG | DIASTOLIC BLOOD PRESSURE: 79 MMHG | BODY MASS INDEX: 35.19 KG/M2

## 2021-07-01 VITALS
SYSTOLIC BLOOD PRESSURE: 135 MMHG | WEIGHT: 255 LBS | HEART RATE: 102 BPM | HEIGHT: 71 IN | BODY MASS INDEX: 35.7 KG/M2 | TEMPERATURE: 97.8 F | DIASTOLIC BLOOD PRESSURE: 90 MMHG

## 2021-07-01 VITALS
HEART RATE: 81 BPM | HEIGHT: 71 IN | WEIGHT: 264 LBS | SYSTOLIC BLOOD PRESSURE: 136 MMHG | BODY MASS INDEX: 36.96 KG/M2 | DIASTOLIC BLOOD PRESSURE: 88 MMHG | TEMPERATURE: 97 F

## 2021-07-01 VITALS
HEART RATE: 87 BPM | DIASTOLIC BLOOD PRESSURE: 94 MMHG | WEIGHT: 264 LBS | HEIGHT: 71 IN | SYSTOLIC BLOOD PRESSURE: 133 MMHG | TEMPERATURE: 97.2 F | BODY MASS INDEX: 36.96 KG/M2

## 2021-07-01 VITALS
HEIGHT: 71 IN | SYSTOLIC BLOOD PRESSURE: 135 MMHG | DIASTOLIC BLOOD PRESSURE: 96 MMHG | HEART RATE: 92 BPM | WEIGHT: 253.6 LBS | BODY MASS INDEX: 35.5 KG/M2 | OXYGEN SATURATION: 98 % | TEMPERATURE: 98.1 F

## 2021-07-01 VITALS
HEIGHT: 71 IN | WEIGHT: 259.5 LBS | BODY MASS INDEX: 36.33 KG/M2 | TEMPERATURE: 99 F | DIASTOLIC BLOOD PRESSURE: 90 MMHG | HEART RATE: 96 BPM | SYSTOLIC BLOOD PRESSURE: 132 MMHG

## 2021-07-01 VITALS
DIASTOLIC BLOOD PRESSURE: 92 MMHG | HEART RATE: 86 BPM | TEMPERATURE: 97.8 F | SYSTOLIC BLOOD PRESSURE: 133 MMHG | HEIGHT: 71 IN | BODY MASS INDEX: 36.88 KG/M2 | WEIGHT: 263.4 LBS

## 2021-07-01 VITALS
SYSTOLIC BLOOD PRESSURE: 114 MMHG | HEART RATE: 76 BPM | TEMPERATURE: 94.2 F | HEIGHT: 71 IN | BODY MASS INDEX: 37.06 KG/M2 | DIASTOLIC BLOOD PRESSURE: 78 MMHG | WEIGHT: 264.7 LBS

## 2021-07-01 VITALS
WEIGHT: 259.4 LBS | SYSTOLIC BLOOD PRESSURE: 117 MMHG | BODY MASS INDEX: 36.31 KG/M2 | TEMPERATURE: 97.8 F | DIASTOLIC BLOOD PRESSURE: 84 MMHG | HEIGHT: 71 IN | HEART RATE: 84 BPM

## 2021-07-01 PROCEDURE — 99213 OFFICE O/P EST LOW 20 MIN: CPT | Performed by: OPTOMETRIST

## 2021-07-01 ASSESSMENT — INTRAOCULAR PRESSURE
OD: 13
OS: 10

## 2021-07-01 NOTE — IMPRESSION/PLAN
Impression: Open angle with borderline findings, high risk, bilateral: H40.023. OU. vs early OAG. OCT 02/01/21: maybe worse superiorly OU. VF 03/26/21: WNL/stable OU. IOP OU good today. Target: Mid teens. Plan: Discussed. Continue Latanoprost QHS OU. Next VF/OCT due 03/2022.

## 2021-07-02 VITALS
TEMPERATURE: 98 F | HEART RATE: 118 BPM | BODY MASS INDEX: 35.64 KG/M2 | HEIGHT: 71 IN | WEIGHT: 254.6 LBS | SYSTOLIC BLOOD PRESSURE: 129 MMHG | DIASTOLIC BLOOD PRESSURE: 89 MMHG

## 2021-07-02 VITALS
HEIGHT: 71 IN | HEART RATE: 100 BPM | WEIGHT: 247 LBS | BODY MASS INDEX: 34.58 KG/M2 | DIASTOLIC BLOOD PRESSURE: 90 MMHG | SYSTOLIC BLOOD PRESSURE: 131 MMHG | TEMPERATURE: 97.5 F

## 2021-07-02 VITALS
WEIGHT: 261.4 LBS | SYSTOLIC BLOOD PRESSURE: 118 MMHG | HEIGHT: 71 IN | BODY MASS INDEX: 36.6 KG/M2 | HEART RATE: 88 BPM | TEMPERATURE: 97.5 F | DIASTOLIC BLOOD PRESSURE: 84 MMHG

## 2021-07-09 RX ORDER — DULAGLUTIDE 1.5 MG/.5ML
INJECTION, SOLUTION SUBCUTANEOUS
Qty: 12 ML | Refills: 0 | Status: SHIPPED | OUTPATIENT
Start: 2021-07-09 | End: 2021-10-01

## 2021-07-09 RX ORDER — HYDROGEN PEROXIDE 2.65 ML/100ML
LIQUID ORAL; TOPICAL
Qty: 90 TABLET | Refills: 0 | Status: SHIPPED | OUTPATIENT
Start: 2021-07-09 | End: 2021-09-29

## 2021-07-09 RX ORDER — MELOXICAM 15 MG/1
TABLET ORAL
Qty: 90 TABLET | Refills: 0 | Status: SHIPPED | OUTPATIENT
Start: 2021-07-09 | End: 2021-10-08

## 2021-07-22 RX ORDER — SITAGLIPTIN 100 MG/1
TABLET, FILM COATED ORAL
Qty: 90 TABLET | Refills: 0 | Status: SHIPPED | OUTPATIENT
Start: 2021-07-22 | End: 2021-10-20

## 2021-07-22 RX ORDER — POTASSIUM CHLORIDE 750 MG/1
TABLET, FILM COATED, EXTENDED RELEASE ORAL
Qty: 180 TABLET | Refills: 0 | Status: SHIPPED | OUTPATIENT
Start: 2021-07-22 | End: 2021-10-20

## 2021-07-22 RX ORDER — AMLODIPINE BESYLATE 10 MG/1
TABLET ORAL
Qty: 90 TABLET | Refills: 0 | Status: SHIPPED | OUTPATIENT
Start: 2021-07-22 | End: 2021-10-20

## 2021-07-22 NOTE — TELEPHONE ENCOUNTER
LAST OV: 5/27/21  NEXT OV: 9/27/21  LAST LAB: 3/24/21    PLEASE SIGN OR ADVISE    Lab Results   Component Value Date    BUN 12 03/24/2021    CREATININE 1.06 03/24/2021    BCR 11 03/24/2021    K 4.0 03/24/2021    CO2 24 03/24/2021    CALCIUM 9.9 03/24/2021    ALBUMIN 4.4 03/24/2021    LABIL2 1.4 03/24/2021    AST 19 03/24/2021    ALT 22 03/24/2021

## 2021-07-22 NOTE — TELEPHONE ENCOUNTER
LAST OV: 5/27/21  NEXT OV: 9/27/21  LAST LAB: 3/24/21    PLEASE SIGN OR ADVISE    Diabetes Panel 1  Order: [id number]  Status:  Final result   Visible to patient:  No (not released)   Next appt:  09/27/2021 at 08:30 AM in Family Medicine (Yolanda Petra Marie, APRN)        0 Result Notes  Component   Ref Range & Units 4 mo ago   Glucose   70 - 99 mg/dL 287High     BUN   5 - 25 mg/dL 12    Creatinine   0.70 - 1.20 mg/dL 1.06    BUN/Creatinine Ratio   6 - 20  11    GFR   >60 mL/min/ >60    Comment: Interpretative Data:   ------------------------------------   STAGE                  GFR   Stage 1                90 mL/min or greater   Stage 2                60-89 mL/min   Stage 3                30-59 mL/min   Stage 4                15-29 mL/min   Value <60 mL/min for 3 or more months is defined as CKD.    Sodium   135 - 147 mmol/L 139    Potassium   3.5 - 5.3 mmol/L 4.0    Chloride   99 - 111 mmol/L 97Low     CO2   22 - 32 mmol/L 24    Anion Gap   8 - 19 mmol/L 22High     OSMOLALITY CALC   273 - 304 298    Total Protein   6.3 - 8.2 g/dL 7.5    Comment: If Patient is receiving dextran as a blood volume expander   result may show a potential interference.    Albumin   3.5 - 5.0 g/dL 4.4    Globulin   2.0 - 3.5 g/dL 3.1    A/G Ratio   1.4 - 2.6  1.4    Calcium   8.7 - 10.4 mg/dL 9.9    Alkaline Phosphatase   56 - 155 U/L 73    ALT (SGPT)   10 - 40 U/L 22    AST (SGOT)   15 - 50 U/L 19    Total Bilirubin   0.20 - 1.30 mg/dL 0.52    Triglycerides   40 - 150 mg/dL 163High     Comment: <150 mg/dL-Normal   150-199 mg/dL-Borderline High   200-499 mg/dL-High   >500 mg/dL- Very High    Total Cholesterol   107 - 200 mg/dL 122    Comment: <200 mg/dL-Desirable   200-239 mg/dL-Borderline High   >240 mg/dL-High   >500 mg/dL-Very High    HDL Cholesterol   40 - 60 mg/dL 40    Comment: <40 mg/dL-Low   >60 mg/dL- Desirable    Chol/HDL Ratio   3.0 - 6.0 NA 3.1    LDL Cholesterol    70 - 100 mg/dL 49Low     Comment: Recommended  LDL Levels    <100 mg/dL-Optimal   100-129 mg/dL-Near Optimal/above optimal   130-159 mg/dL-Borderline High   160-189 mg/dL-High   >190 mg/dL-Very High    VLDL Cholesterol   5 - 37 mg/dL 33    Mean Bld Glu Estim.   mg/dL 283    Hemoglobin A1C   3.5 - 5.7 % 11.5High     Comment: **Interpretation**   <7% in Controlled Diabetic Patients.   Assay Range 3.4-18.2%   ADA 2010 Standards of Medical Care in Diabetes suggest using   a cut point of >= 6.5% for diagnosis of diabetes and an A1C   range of 5.7%-6.4% as a category of increased risk for future   diabetes.          Specimen Collected: 03/24/21 09:42 Last Resulted: 03/24/21 22:39        Lab Flowsheet     Order Details     View Encounter     Lab and Collection Details     Routing     Result History              Other Results from 3/24/2021    PSA Screen    Status:  Final result   Visible to patient:  No (not released)   Next appt:  09/27/2021 at 08:30 AM in Family Medicine (ERUM Charles)  Order: [id number]        0 Result Notes  Component   Ref Range & Units 4 mo ago   PSA   0.00 - 4.00 ng/mL 0.40          Specimen Collected: 03/24/21 09:42 Last Resulted: 03/24/21 18:03

## 2021-07-22 NOTE — TELEPHONE ENCOUNTER
LAST OV:3/24/21  NEXT OV: NONE- LMTRC FOR APPT NEED   LAST LAB: 3/24/21    PLEASE SIGN OR ADVISE    Diabetes Panel 1  Order: [id number]  Status:  Final result   Visible to patient:  No (not released)   Next appt:  09/27/2021 at 08:30 AM in Family Medicine (Yolanda Lambert Cynthia, APRN)        0 Result Notes  Component   Ref Range & Units 4 mo ago   Glucose   70 - 99 mg/dL 287High     BUN   5 - 25 mg/dL 12    Creatinine   0.70 - 1.20 mg/dL 1.06    BUN/Creatinine Ratio   6 - 20  11    GFR   >60 mL/min/ >60    Comment: Interpretative Data:   ------------------------------------   STAGE                  GFR   Stage 1                90 mL/min or greater   Stage 2                60-89 mL/min   Stage 3                30-59 mL/min   Stage 4                15-29 mL/min   Value <60 mL/min for 3 or more months is defined as CKD.    Sodium   135 - 147 mmol/L 139    Potassium   3.5 - 5.3 mmol/L 4.0    Chloride   99 - 111 mmol/L 97Low     CO2   22 - 32 mmol/L 24    Anion Gap   8 - 19 mmol/L 22High     OSMOLALITY CALC   273 - 304 298    Total Protein   6.3 - 8.2 g/dL 7.5    Comment: If Patient is receiving dextran as a blood volume expander   result may show a potential interference.    Albumin   3.5 - 5.0 g/dL 4.4    Globulin   2.0 - 3.5 g/dL 3.1    A/G Ratio   1.4 - 2.6  1.4    Calcium   8.7 - 10.4 mg/dL 9.9    Alkaline Phosphatase   56 - 155 U/L 73    ALT (SGPT)   10 - 40 U/L 22    AST (SGOT)   15 - 50 U/L 19    Total Bilirubin   0.20 - 1.30 mg/dL 0.52    Triglycerides   40 - 150 mg/dL 163High     Comment: <150 mg/dL-Normal   150-199 mg/dL-Borderline High   200-499 mg/dL-High   >500 mg/dL- Very High    Total Cholesterol   107 - 200 mg/dL 122    Comment: <200 mg/dL-Desirable   200-239 mg/dL-Borderline High   >240 mg/dL-High   >500 mg/dL-Very High    HDL Cholesterol   40 - 60 mg/dL 40    Comment: <40 mg/dL-Low   >60 mg/dL- Desirable    Chol/HDL Ratio   3.0 - 6.0 NA 3.1    LDL Cholesterol    70 - 100 mg/dL 49Low     Comment:  Recommended  LDL Levels   <100 mg/dL-Optimal   100-129 mg/dL-Near Optimal/above optimal   130-159 mg/dL-Borderline High   160-189 mg/dL-High   >190 mg/dL-Very High    VLDL Cholesterol   5 - 37 mg/dL 33    Mean Bld Glu Estim.   mg/dL 283    Hemoglobin A1C   3.5 - 5.7 % 11.5High     Comment: **Interpretation**   <7% in Controlled Diabetic Patients.   Assay Range 3.4-18.2%   ADA 2010 Standards of Medical Care in Diabetes suggest using   a cut point of >= 6.5% for diagnosis of diabetes and an A1C   range of 5.7%-6.4% as a category of increased risk for future   diabetes.          Specimen Collected: 03/24/21 09:42 Last Resulted: 03/24/21 22:39

## 2021-08-11 RX ORDER — ATORVASTATIN CALCIUM 20 MG/1
TABLET, FILM COATED ORAL
Qty: 90 TABLET | Refills: 0 | Status: SHIPPED | OUTPATIENT
Start: 2021-08-11 | End: 2021-08-12

## 2021-08-11 RX ORDER — TRAZODONE HYDROCHLORIDE 100 MG/1
TABLET ORAL
Qty: 90 TABLET | Refills: 0 | Status: SHIPPED | OUTPATIENT
Start: 2021-08-11 | End: 2021-08-12

## 2021-08-12 RX ORDER — ATORVASTATIN CALCIUM 20 MG/1
TABLET, FILM COATED ORAL
Qty: 90 TABLET | Refills: 0 | Status: SHIPPED | OUTPATIENT
Start: 2021-08-12 | End: 2022-02-14

## 2021-08-12 RX ORDER — TRAZODONE HYDROCHLORIDE 100 MG/1
TABLET ORAL
Qty: 90 TABLET | Refills: 0 | Status: SHIPPED | OUTPATIENT
Start: 2021-08-12 | End: 2022-02-16

## 2021-08-20 RX ORDER — CLONIDINE HYDROCHLORIDE 0.2 MG/1
0.2 TABLET ORAL 2 TIMES DAILY
Qty: 180 TABLET | Refills: 0 | Status: SHIPPED | OUTPATIENT
Start: 2021-08-20 | End: 2021-09-04 | Stop reason: SDUPTHER

## 2021-08-20 NOTE — TELEPHONE ENCOUNTER
Rx Refill Note  Requested Prescriptions     Pending Prescriptions Disp Refills   • cloNIDine (CATAPRES) 0.2 MG tablet [Pharmacy Med Name: cloNIDine HCl 0.2 MG Oral Tablet] 180 tablet 0     Sig: Take 1 tablet by mouth twice daily      Last office visit with prescribing clinician: 5/27/21      Next office visit with prescribing clinician: 9/27/2021       {TIP  Please add Last Relevant Lab Date if appropriate: 3/24/21  {TIP  Is Refill Pharmacy correct?Shital Whyte  08/20/21, 09:25 EDT

## 2021-08-26 RX ORDER — CLONIDINE HYDROCHLORIDE 0.2 MG/1
TABLET ORAL
Qty: 180 TABLET | Refills: 0 | OUTPATIENT
Start: 2021-08-26

## 2021-08-30 RX ORDER — LISINOPRIL AND HYDROCHLOROTHIAZIDE 20; 12.5 MG/1; MG/1
1 TABLET ORAL 2 TIMES DAILY
Qty: 180 TABLET | Refills: 0 | Status: SHIPPED | OUTPATIENT
Start: 2021-08-30 | End: 2021-12-03

## 2021-08-30 NOTE — TELEPHONE ENCOUNTER
Rx Refill Note  Requested Prescriptions     Pending Prescriptions Disp Refills   • lisinopril-hydrochlorothiazide (PRINZIDE,ZESTORETIC) 20-12.5 MG per tablet [Pharmacy Med Name: Lisinopril-hydroCHLOROthiazide 20-12.5 MG Oral Tablet] 180 tablet 0     Sig: Take 1 tablet by mouth twice daily      Last office visit with prescribing clinician: 5/27/21     Next office visit with prescribing clinician: 9/27/2021       {TIP  Please add Last Relevant Lab Date if appropriate:   Lab Results   Component Value Date    BUN 12 03/24/2021    CREATININE 1.06 03/24/2021    BCR 11 03/24/2021    K 4.0 03/24/2021    CO2 24 03/24/2021    CALCIUM 9.9 03/24/2021    ALBUMIN 4.4 03/24/2021    LABIL2 1.4 03/24/2021    AST 19 03/24/2021    ALT 22 03/24/2021        {TIP  Is Refill Pharmacy correct?YES  Elysia Whyte  08/30/21, 10:42 EDT

## 2021-09-04 ENCOUNTER — DOCUMENTATION (OUTPATIENT)
Dept: FAMILY MEDICINE CLINIC | Age: 66
End: 2021-09-04

## 2021-09-04 RX ORDER — CLONIDINE HYDROCHLORIDE 0.2 MG/1
0.2 TABLET ORAL 2 TIMES DAILY
Qty: 180 TABLET | Refills: 0 | Status: SHIPPED | OUTPATIENT
Start: 2021-09-04 | End: 2021-12-03

## 2021-09-27 ENCOUNTER — LAB (OUTPATIENT)
Dept: LAB | Facility: HOSPITAL | Age: 66
End: 2021-09-27

## 2021-09-27 ENCOUNTER — OFFICE VISIT (OUTPATIENT)
Dept: FAMILY MEDICINE CLINIC | Age: 66
End: 2021-09-27

## 2021-09-27 VITALS
WEIGHT: 246.8 LBS | DIASTOLIC BLOOD PRESSURE: 85 MMHG | BODY MASS INDEX: 34.42 KG/M2 | SYSTOLIC BLOOD PRESSURE: 114 MMHG | HEART RATE: 101 BPM

## 2021-09-27 DIAGNOSIS — E11.65 TYPE 2 DIABETES MELLITUS WITH HYPERGLYCEMIA, WITHOUT LONG-TERM CURRENT USE OF INSULIN (HCC): ICD-10-CM

## 2021-09-27 DIAGNOSIS — I10 ESSENTIAL (PRIMARY) HYPERTENSION: ICD-10-CM

## 2021-09-27 DIAGNOSIS — E78.49 OTHER HYPERLIPIDEMIA: Primary | ICD-10-CM

## 2021-09-27 DIAGNOSIS — Z00.00 ROUTINE GENERAL MEDICAL EXAMINATION AT A HEALTH CARE FACILITY: ICD-10-CM

## 2021-09-27 LAB
ALBUMIN SERPL-MCNC: 4.6 G/DL (ref 3.5–5.2)
ALBUMIN UR-MCNC: <1.2 MG/DL
ALBUMIN/GLOB SERPL: 1.5 G/DL
ALP SERPL-CCNC: 78 U/L (ref 39–117)
ALT SERPL W P-5'-P-CCNC: 18 U/L (ref 1–41)
ANION GAP SERPL CALCULATED.3IONS-SCNC: 13.4 MMOL/L (ref 5–15)
AST SERPL-CCNC: 17 U/L (ref 1–40)
BILIRUB SERPL-MCNC: 0.5 MG/DL (ref 0–1.2)
BUN SERPL-MCNC: 15 MG/DL (ref 8–23)
BUN/CREAT SERPL: 13.6 (ref 7–25)
CALCIUM SPEC-SCNC: 10.4 MG/DL (ref 8.6–10.5)
CHLORIDE SERPL-SCNC: 98 MMOL/L (ref 98–107)
CHOLEST SERPL-MCNC: 143 MG/DL (ref 0–200)
CO2 SERPL-SCNC: 25.6 MMOL/L (ref 22–29)
CREAT SERPL-MCNC: 1.1 MG/DL (ref 0.76–1.27)
CREAT UR-MCNC: 111.2 MG/DL
GFR SERPL CREATININE-BSD FRML MDRD: 81 ML/MIN/1.73
GLOBULIN UR ELPH-MCNC: 3 GM/DL
GLUCOSE SERPL-MCNC: 270 MG/DL (ref 65–99)
HBA1C MFR BLD: 12.38 % (ref 4.8–5.6)
HDLC SERPL-MCNC: 39 MG/DL (ref 40–60)
LDLC SERPL CALC-MCNC: 68 MG/DL (ref 0–100)
LDLC/HDLC SERPL: 1.53 {RATIO}
MICROALBUMIN/CREAT UR: NORMAL MG/G{CREAT}
POTASSIUM SERPL-SCNC: 4 MMOL/L (ref 3.5–5.2)
PROT SERPL-MCNC: 7.6 G/DL (ref 6–8.5)
SODIUM SERPL-SCNC: 137 MMOL/L (ref 136–145)
TRIGL SERPL-MCNC: 222 MG/DL (ref 0–150)
VLDLC SERPL-MCNC: 36 MG/DL (ref 5–40)

## 2021-09-27 PROCEDURE — 82570 ASSAY OF URINE CREATININE: CPT

## 2021-09-27 PROCEDURE — G0439 PPPS, SUBSEQ VISIT: HCPCS | Performed by: NURSE PRACTITIONER

## 2021-09-27 PROCEDURE — 80061 LIPID PANEL: CPT

## 2021-09-27 PROCEDURE — 36415 COLL VENOUS BLD VENIPUNCTURE: CPT

## 2021-09-27 PROCEDURE — 80053 COMPREHEN METABOLIC PANEL: CPT

## 2021-09-27 PROCEDURE — 83036 HEMOGLOBIN GLYCOSYLATED A1C: CPT

## 2021-09-27 PROCEDURE — 82043 UR ALBUMIN QUANTITATIVE: CPT

## 2021-09-27 RX ORDER — TRAMADOL HYDROCHLORIDE 50 MG/1
50 TABLET ORAL 3 TIMES DAILY PRN
COMMUNITY
Start: 2021-08-02 | End: 2021-09-27
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
## 2021-09-27 NOTE — PROGRESS NOTES
The ABCs of the Annual Wellness Visit  Subsequent Medicare Wellness Visit    Chief Complaint   Patient presents with   • Medicare Wellness-subsequent      Subjective    History of Present Illness:  Maksim Acosta is a 66 y.o. male who presents for a Subsequent Medicare Wellness Visit.    Medicare wellness HPI  Exercises regularly: walks at least 3 times a week   Eats healthy:tries   Wears seatbelts:ues   Living will:not yet /given today   PSA screening 3-2021  Optometrist:10-27-20/hx glaucoma /[facility]   Dentist:[facility]  Alcohol intake:not drinking   Drugs:none, was on THC 2-2020  Falls:none   Colonoscopy: 7-2019 diverticulosis, next due 2024  Immunizations:Has had shingrex, pneum 23, covid  And is UTD on Tdap    Diabetes:  Current medication Trulicity, januvia, metformin   Tries to eat healthy, but not doing as well since pain from his shoulder   Tolerating medication: Yes  Last eye exam   Last foot exam 11-  At home BS ranges: not checking sugars due to monitor needs a new battery   Lab Results   Component Value Date    HGBA1C 11.5 (H) 03/24/2021     Hypertension:  Current medication Lisinopril HCTZ, norvasc, clonidine   Tolerating Medication: Yes  Checking BP at home and it is : not checking   Needs refills: No  Labs   Lab Results   Component Value Date    BUN 12 03/24/2021    CREATININE 1.06 03/24/2021    BCR 11 03/24/2021    K 4.0 03/24/2021    CO2 24 03/24/2021    CALCIUM 9.9 03/24/2021    ALBUMIN 4.4 03/24/2021    LABIL2 1.4 03/24/2021    AST 19 03/24/2021    ALT 22 03/24/2021       Hyperlipidemia  Current medication lipitor  Tolerating medication: Yes  Needs Refill: No    Lab Results   Component Value Date    CHLPL 122 03/24/2021    TRIG 163 (H) 03/24/2021    HDL 40 03/24/2021    LDL 49 (L) 03/24/2021           The following portions of the patient's history were reviewed and   updated as appropriate: allergies, current medications, past family history, past medical history, past  social history and past surgical history.    Compared to one year ago, the patient feels his physical   health is better.    Compared to one year ago, the patient feels his mental   health is better.    Recent Hospitalizations:  He was not admitted to the hospital during the last year.       Current Medical Providers:  Patient Care Team:  Yolanda Marie APRN as PCP - General (Family Medicine)    Outpatient Medications Prior to Visit   Medication Sig Dispense Refill   • amLODIPine (NORVASC) 10 MG tablet Take 1 tablet by mouth once daily 90 tablet 0   • atorvastatin (LIPITOR) 20 MG tablet Take 1 tablet by mouth once daily 90 tablet 0   • cloNIDine (CATAPRES) 0.2 MG tablet Take 1 tablet by mouth 2 (Two) Times a Day. 180 tablet 0   • EQ Aspirin Adult Low Dose 81 MG EC tablet Take 1 tablet by mouth once daily 90 tablet 0   • gabapentin (NEURONTIN) 100 MG capsule Take 1 capsule by mouth 3 (Three) Times a Day. 90 capsule 0   • Januvia 100 MG tablet Take 1 tablet by mouth once daily 90 tablet 0   • lisinopril-hydrochlorothiazide (PRINZIDE,ZESTORETIC) 20-12.5 MG per tablet Take 1 tablet by mouth 2 (Two) Times a Day. 180 tablet 0   • meloxicam (MOBIC) 15 MG tablet TAKE 1 TABLET BY MOUTH ONCE DAILY WITH FOOD (NEED  APPOINTMENT) 90 tablet 0   • metFORMIN (GLUCOPHAGE) 1000 MG tablet Take 1 tablet by mouth 2 (Two) Times a Day. 180 tablet 0   • potassium chloride 10 MEQ CR tablet Take 1 tablet by mouth twice daily 180 tablet 0   • traZODone (DESYREL) 100 MG tablet TAKE 1 TABLET BY MOUTH AT BEDTIME 90 tablet 0   • Trulicity 1.5 MG/0.5ML solution pen-injector INJECT 1 .5MG SUBCUTANEOUSLY ONCE A WEEK 12 mL 0   • traMADol (ULTRAM) 50 MG tablet Take 50 mg by mouth 3 (Three) Times a Day As Needed. for pain       No facility-administered medications prior to visit.       No opioid medication identified on active medication list. I have reviewed chart for other potential  high risk medication/s and harmful drug interactions in the  elderly.          Aspirin is on active medication list. Aspirin use is indicated based on review of current medical condition/s. Pros and cons of this therapy have been discussed today. Benefits of this medication outweigh potential harm.  Patient has been encouraged to continue taking this medication.  .      Patient Active Problem List   Diagnosis   • Hyperlipidemia, unspecified   • Essential (primary) hypertension   • Type 2 diabetes mellitus with hyperglycemia (HCC)   • Routine general medical examination at a health care facility   • Insomnia, unspecified     Advance Care Planning  Advance Directive is not on file.  ACP discussion was held with the patient during this visit. Patient does not have an advance directive, information provided.    Review of Systems   Constitutional: Negative for fatigue and fever.   HENT: Negative for congestion.    Eyes: Negative for visual disturbance.   Respiratory: Negative for cough and shortness of breath.    Cardiovascular: Negative for chest pain, palpitations and leg swelling.   Gastrointestinal: Negative for abdominal pain.   Genitourinary: Negative for dysuria.   Musculoskeletal: Negative for arthralgias.        Right shoulder pain, last surgery 7-27-21, has done PT    Skin: Negative for rash.   Psychiatric/Behavioral: Positive for sleep disturbance (insomnia, takes trazodone ). The patient is not nervous/anxious.         Objective    Vitals:    09/27/21 0822   BP: 114/85   BP Location: Right arm   Patient Position: Sitting   Pulse: 101   Weight: 112 kg (246 lb 12.8 oz)     BMI Readings from Last 1 Encounters:   09/27/21 34.42 kg/m²   BMI is above normal parameters. Recommendations include: exercise counseling and nutrition counseling    Does the patient have evidence of cognitive impairment? No    Physical Exam  Vitals reviewed.   Constitutional:       Appearance: Normal appearance. He is well-developed.   HENT:      Head: Normocephalic and atraumatic.      Right Ear:  External ear normal.      Left Ear: External ear normal.      Mouth/Throat:      Pharynx: No oropharyngeal exudate.   Eyes:      Conjunctiva/sclera: Conjunctivae normal.      Pupils: Pupils are equal, round, and reactive to light.   Cardiovascular:      Rate and Rhythm: Normal rate and regular rhythm.      Heart sounds: No murmur heard.   No friction rub. No gallop.    Pulmonary:      Effort: Pulmonary effort is normal.      Breath sounds: Normal breath sounds. No wheezing or rhonchi.   Abdominal:      General: Bowel sounds are normal. There is no distension.      Palpations: Abdomen is soft.      Tenderness: There is no abdominal tenderness.   Skin:     General: Skin is warm and dry.   Neurological:      Mental Status: He is alert and oriented to person, place, and time.      Cranial Nerves: No cranial nerve deficit.   Psychiatric:         Mood and Affect: Mood and affect normal.         Behavior: Behavior normal.         Thought Content: Thought content normal.         Judgment: Judgment normal.                 HEALTH RISK ASSESSMENT    Smoking Status:  Social History     Tobacco Use   Smoking Status Former Smoker   • Years: 15.00   • Types: Cigarettes   • Quit date:    • Years since quittin.7   Smokeless Tobacco Never Used     Alcohol Consumption:  Social History     Substance and Sexual Activity   Alcohol Use None    Comment: NON-DRINKER     Fall Risk Screen:    Critical access hospital Fall Risk Assessment has not been completed.    Depression Screening:  PHQ-2/PHQ-9 Depression Screening 2021   Little interest or pleasure in doing things 0   Feeling down, depressed, or hopeless 0   Total Score 0       Health Habits and Functional and Cognitive Screening:  Functional & Cognitive Status 2021   Do you have difficulty preparing food and eating? No   Do you have difficulty bathing yourself, getting dressed or grooming yourself? No   Do you have difficulty using the toilet? No   Do you have difficulty moving around  from place to place? No   Do you have trouble with steps or getting out of a bed or a chair? No   Current Diet Other   Dental Exam Up to date   Eye Exam Up to date   Exercise (times per week) 3 times per week   Current Exercises Include Walking   Do you need help using the phone?  No   Are you deaf or do you have serious difficulty hearing?  No   Do you need help with transportation? No   Do you need help shopping? No   Do you need help preparing meals?  No   Do you need help with housework?  No   Do you need help with laundry? No   Do you need help taking your medications? No   Do you need help managing money? No   Do you ever drive or ride in a car without wearing a seat belt? No   Have you felt unusual stress, anger or loneliness in the last month? No   Who do you live with? Alone   If you need help, do you have trouble finding someone available to you? No   Have you been bothered in the last four weeks by sexual problems? No   Do you have difficulty concentrating, remembering or making decisions? No       Age-appropriate Screening Schedule:  Refer to the list below for future screening recommendations based on patient's age, sex and/or medical conditions. Orders for these recommended tests are listed in the plan section. The patient has been provided with a written plan.    Health Maintenance   Topic Date Due   • URINE MICROALBUMIN  05/07/2021   • DIABETIC FOOT EXAM  Never done   • DIABETIC EYE EXAM  Never done   • HEMOGLOBIN A1C  09/24/2021   • LIPID PANEL  Never done   • INFLUENZA VACCINE  10/01/2021   • TDAP/TD VACCINES (2 - Td or Tdap) 03/19/2029   • ZOSTER VACCINE  Completed              Assessment/Plan   CMS Preventative Services Quick Reference  Risk Factors Identified During Encounter  Cardiovascular Disease  Dementia/Memory   Depression/Dysphoria  Immunizations Discussed/Encouraged (specific Immunizations; Influenza  Obesity/Overweight   The above risks/problems have been discussed with the  patient.  Follow up actions/plans if indicated are seen below in the Assessment/Plan Section.  Pertinent information has been shared with the patient in the After Visit Summary.    Diagnoses and all orders for this visit:    1. Other hyperlipidemia (Primary)  Assessment & Plan:  Continue current medication and efforts with diet and exercise.         2. Essential (primary) hypertension  Assessment & Plan:  Hypertension is stable. Continue to monitor BP at home. Continue current meds. Continue to modify diet and lifestyle. Will need labs every 6 months and follow up.         3. Type 2 diabetes mellitus with hyperglycemia, without long-term current use of insulin (HCC)  Assessment & Plan:  To work on diet, regular exercise, monitor sugars, check feet daily, see optometrist yearly or as directed.  Will get a new battery and start checking his sugars, will recheck labs before advising any rx changes      Orders:  -     Comprehensive Metabolic Panel; Future  -     Lipid Panel; Future  -     Hemoglobin A1c; Future  -     Microalbumin / Creatinine Urine Ratio - Urine, Clean Catch; Future    4. Routine general medical examination at a health care facility  Assessment & Plan:  Advise regular exercise, healthy eating, always wear seat belts. Living will, fall prevention discussed.  Immunizations discussed. Advised to get high dose flu vaccine when available and his covid booster vaccine.    To continue yearly optometry and dental exams.    Handout given on living will, to complete and bring back in.            Follow Up:   Return for followup pending lab results.     An After Visit Summary and PPPS were made available to the patient.

## 2021-09-27 NOTE — ASSESSMENT & PLAN NOTE
To work on diet, regular exercise, monitor sugars, check feet daily, see optometrist yearly or as directed.  Will get a new battery and start checking his sugars, will recheck labs before advising any rx changes

## 2021-09-28 NOTE — TELEPHONE ENCOUNTER
Rx Refill Note  Requested Prescriptions     Pending Prescriptions Disp Refills   • EQ Aspirin Adult Low Dose 81 MG EC tablet [Pharmacy Med Name: EQ Aspirin Adult Low Dose 81 MG Oral Tablet Delayed Release] 90 tablet 0     Sig: Take 1 tablet by mouth once daily      Last office visit with prescribing clinician: 9/27/21     Next office visit with prescribing clinician: NONE          {TIP  Is Refill Pharmacy correct?YES  Elysia Whyte  09/28/21, 13:26 EDT

## 2021-09-28 NOTE — TELEPHONE ENCOUNTER
Rx Refill Note  Requested Prescriptions     Pending Prescriptions Disp Refills   • metFORMIN (GLUCOPHAGE) 1000 MG tablet [Pharmacy Med Name: metFORMIN HCl 1000 MG Oral Tablet] 180 tablet 0     Sig: Take 1 tablet by mouth twice daily      Last office visit with prescribing clinician: Visit date not found      Next office visit with prescribing clinician: 9/27/2021       {TIP  Please add Last Relevant Lab Date if appropriate:  Lab Results   Component Value Date    GLUCOSE 270 (H) 09/27/2021    BUN 15 09/27/2021    CREATININE 1.10 09/27/2021    EGFRIFAFRI 81 09/27/2021    BCR 13.6 09/27/2021    K 4.0 09/27/2021    CO2 25.6 09/27/2021    CALCIUM 10.4 09/27/2021    ALBUMIN 4.60 09/27/2021    LABIL2 1.4 03/24/2021    AST 17 09/27/2021    ALT 18 09/27/2021         Lab 09/27/21 0924   HEMOGLOBIN A1C 12.38*        {TIP  Is Refill Pharmacy correct?YES  Elysia Whyte  09/28/21, 13:28 EDT

## 2021-09-29 RX ORDER — ASPIRIN 81 MG/1
81 TABLET ORAL DAILY
Qty: 90 TABLET | Refills: 3 | Status: SHIPPED | OUTPATIENT
Start: 2021-09-29

## 2021-10-01 RX ORDER — DULAGLUTIDE 1.5 MG/.5ML
INJECTION, SOLUTION SUBCUTANEOUS
Qty: 12 ML | Refills: 1 | Status: SHIPPED | OUTPATIENT
Start: 2021-10-01 | End: 2021-10-08

## 2021-10-01 NOTE — TELEPHONE ENCOUNTER
Rx Refill Note  Requested Prescriptions     Pending Prescriptions Disp Refills   • Trulicity 1.5 MG/0.5ML solution pen-injector [Pharmacy Med Name: Trulicity 1.5 MG/0.5ML Subcutaneous Solution Pen-injector] 12 mL 0     Sig: INJECT 1.5MG SUBCUTANEOUSLY ONCE WEEKLY      Last office visit with prescribing clinician: 9/27/2021      Next office visit with prescribing clinician: Visit date not found       {TIP  Please add Last Relevant Lab Date if appropriate:   Lab Results   Component Value Date    GLUCOSE 270 (H) 09/27/2021    BUN 15 09/27/2021    CREATININE 1.10 09/27/2021    EGFRIFAFRI 81 09/27/2021    BCR 13.6 09/27/2021    K 4.0 09/27/2021    CO2 25.6 09/27/2021    CALCIUM 10.4 09/27/2021    ALBUMIN 4.60 09/27/2021    LABIL2 1.4 03/24/2021    AST 17 09/27/2021    ALT 18 09/27/2021         Lab 09/27/21 0924   HEMOGLOBIN A1C 12.38*       {TIP  Is Refill Pharmacy correct?yes  Elysia Whyte  10/01/21, 09:14 EDT

## 2021-10-04 ENCOUNTER — OFFICE VISIT (OUTPATIENT)
Dept: URBAN - METROPOLITAN AREA CLINIC 71 | Facility: CLINIC | Age: 66
End: 2021-10-04
Payer: COMMERCIAL

## 2021-10-04 DIAGNOSIS — E11.65 TYPE 2 DIABETES MELLITUS WITH HYPERGLYCEMIA, WITHOUT LONG-TERM CURRENT USE OF INSULIN (HCC): Primary | ICD-10-CM

## 2021-10-04 DIAGNOSIS — H25.13 NUCLEAR SCLEROSIS CATARACT, BILATERAL: ICD-10-CM

## 2021-10-04 PROCEDURE — 99213 OFFICE O/P EST LOW 20 MIN: CPT | Performed by: OPTOMETRIST

## 2021-10-04 ASSESSMENT — INTRAOCULAR PRESSURE
OD: 13
OS: 12

## 2021-10-08 ENCOUNTER — OFFICE VISIT (OUTPATIENT)
Dept: DIABETES SERVICES | Facility: CLINIC | Age: 66
End: 2021-10-08

## 2021-10-08 VITALS
SYSTOLIC BLOOD PRESSURE: 114 MMHG | BODY MASS INDEX: 33.91 KG/M2 | HEIGHT: 71 IN | OXYGEN SATURATION: 99 % | DIASTOLIC BLOOD PRESSURE: 79 MMHG | WEIGHT: 242.2 LBS | TEMPERATURE: 97.9 F | HEART RATE: 90 BPM

## 2021-10-08 DIAGNOSIS — E11.65 UNCONTROLLED TYPE 2 DIABETES MELLITUS WITH HYPERGLYCEMIA (HCC): Primary | ICD-10-CM

## 2021-10-08 DIAGNOSIS — E66.9 OBESITY (BMI 30-39.9): ICD-10-CM

## 2021-10-08 PROCEDURE — 99204 OFFICE O/P NEW MOD 45 MIN: CPT | Performed by: NURSE PRACTITIONER

## 2021-10-08 RX ORDER — DULAGLUTIDE 3 MG/.5ML
3 INJECTION, SOLUTION SUBCUTANEOUS
Qty: 2 ML | Refills: 3 | Status: SHIPPED | OUTPATIENT
Start: 2021-10-08 | End: 2021-10-30

## 2021-10-08 RX ORDER — DICLOFENAC SODIUM AND MISOPROSTOL 75; 200 MG/1; UG/1
1 TABLET, DELAYED RELEASE ORAL 2 TIMES DAILY
COMMUNITY
End: 2023-01-10

## 2021-10-08 NOTE — PROGRESS NOTES
Chief Complaint  Diabetes (est care)    Referred By: KADIE Charles    Subjective          Maksim Acosta presents to [facility] for diabetes medication management    History of Present Illness    Visit type:  an initial evaluation  Diabetes type:  Type 2  Age at time of diagnosis/Number of years:  2009  Current diabetes status/concerns/issues: The patient is here today to establish care for his diabetes.  He states his glucose levels were previously fairly well controlled but over the last several months he was experiencing significant pain due to rotator cuff issues and surgery and was eating a lot of comfort foods that included cakes and donuts and cookies as well as sugary drinks.  He states since being recently advised of his significantly increased A1c he has stopped eating and drinking all of these foods.    Other health concerns: Arthritis; May - had rotator cuff repair and then had to have manipulation in July  Diabetes symptoms:    Polyuria: Yes   Polydipsia: Yes   Polyphagia: No   Blurred vision: No   Excessive fatigue: Yes  Diabetes complications:  Neuropathy:No  Nephropathy:No  Retinopathy:No  Amputation/Wounds:No  Gastroparesis:No  Cardiovascular Disease:No  Erectile Dysfunction:Yes per patient  Hospitalizations secondary to DM?  No  ER/911 Secondary to Dm?  No  Hypoglycemia:  None reported at this time  Hypoglycemia Symptoms:  No hypoglycemia at this time  Current Diabetes treatment:  Januvia 100 mg once a day, metformin 1000 mg twice a day, and Trulicity 1.5 mg once weekly (been taking for 8-9 months)  Prior diabetes treatments:  None that he can recall  Blood glucose device:  has not been testing due to needing new battery for meter  Blood glucose monitoring frequency:  None  Blood glucose range/average:  Unknown at this time  Diet:  was eating lots of sweets and drinking sugary drinks but has stopped just recently  Activity:  walking and has 4 flights of  stairs to climb    Past Medical History:   Diagnosis Date   • Acanthosis nigricans    • Chronic pain     LOW BACK    • Essential (primary) hypertension    • Glaucoma     DX'D IN    • Hyperlipidemia, unspecified    • Hypokalemia    • Insomnia, unspecified    • Low back pain    • Nicotine dependence, cigarettes, in remission    • Obstructive sleep apnea (adult) (pediatric)     (+) SLEEP STUDY; USES CPAP   • Other symptoms and signs involving cognitive functions and awareness    • Pain in left leg    • Pain in left thigh    • Personal history of colonic polyps    • Psoriasis, unspecified    • Type 2 diabetes mellitus with hyperglycemia (HCC)     DX'D IN    • Vitamin D deficiency      Past Surgical History:   Procedure Laterality Date   • COLONOSCOPY     • ROTATOR CUFF REPAIR Right    • SHOULDER ARTHROSCOPY Right 2021     Family History   Problem Relation Age of Onset   • Stroke Mother    • Dementia Father    • Diabetes type II Father    • Heart attack Brother 71   • Cancer Brother         PHARYNX     Social History     Socioeconomic History   • Marital status:      Spouse name: Not on file   • Number of children: 2   • Years of education: Not on file   • Highest education level: Not on file   Tobacco Use   • Smoking status: Former Smoker     Years: 15.00     Types: Cigarettes     Quit date:      Years since quittin.7   • Smokeless tobacco: Never Used   Vaping Use   • Vaping Use: Never used   Substance and Sexual Activity   • Alcohol use: Never     Comment: NON-DRINKER   • Drug use: Not Currently     Types: Marijuana     Comment: stopped     • Sexual activity: Defer     No Known Allergies    Current Outpatient Medications:   •  amLODIPine (NORVASC) 10 MG tablet, Take 1 tablet by mouth once daily, Disp: 90 tablet, Rfl: 0  •  aspirin (EQ Aspirin Adult Low Dose) 81 MG EC tablet, Take 1 tablet by mouth Daily., Disp: 90 tablet, Rfl: 3  •  atorvastatin (LIPITOR) 20 MG tablet, Take 1  tablet by mouth once daily, Disp: 90 tablet, Rfl: 0  •  cloNIDine (CATAPRES) 0.2 MG tablet, Take 1 tablet by mouth 2 (Two) Times a Day., Disp: 180 tablet, Rfl: 0  •  diclofenac-miSOPROStol (ARTHROTEC 75) 75-0.2 MG EC tablet, Take 1 tablet by mouth 2 (Two) Times a Day., Disp: , Rfl:   •  Januvia 100 MG tablet, Take 1 tablet by mouth once daily, Disp: 90 tablet, Rfl: 0  •  lisinopril-hydrochlorothiazide (PRINZIDE,ZESTORETIC) 20-12.5 MG per tablet, Take 1 tablet by mouth 2 (Two) Times a Day., Disp: 180 tablet, Rfl: 0  •  metFORMIN (GLUCOPHAGE) 1000 MG tablet, Take 1 tablet by mouth 2 (Two) Times a Day., Disp: 180 tablet, Rfl: 1  •  potassium chloride 10 MEQ CR tablet, Take 1 tablet by mouth twice daily, Disp: 180 tablet, Rfl: 0  •  traZODone (DESYREL) 100 MG tablet, TAKE 1 TABLET BY MOUTH AT BEDTIME, Disp: 90 tablet, Rfl: 0  •  Dulaglutide (Trulicity) 3 MG/0.5ML solution pen-injector, Inject 3 mg under the skin into the appropriate area as directed Every 7 (Seven) Days for 4 doses., Disp: 2 mL, Rfl: 3    Review of Systems   Constitutional: Positive for unexpected weight loss (has lost 4 pounds since 9/27). Negative for activity change, appetite change, fatigue, fever and unexpected weight gain.   HENT: Positive for sore throat (dry throat due to CPAP machine). Negative for congestion, ear pain, facial swelling, hearing loss and tinnitus.    Eyes: Positive for blurred vision (needs reading glasses). Negative for double vision, redness and visual disturbance.   Respiratory: Negative for cough, shortness of breath and wheezing.    Cardiovascular: Negative for chest pain, palpitations and leg swelling.   Gastrointestinal: Negative for abdominal distention, constipation, diarrhea, nausea, vomiting, GERD and indigestion.   Endocrine: Positive for polydipsia and polyuria. Negative for polyphagia.   Genitourinary: Negative for difficulty urinating, frequency and urgency.   Musculoskeletal: Positive for arthralgias and back  "pain. Negative for gait problem and myalgias.   Skin: Negative for rash, skin lesions and bruise.   Neurological: Negative for seizures, speech difficulty, weakness, headache and confusion.   Psychiatric/Behavioral: Negative for sleep disturbance, depressed mood and stress. The patient is not nervous/anxious.         Objective     Vitals:    10/08/21 1019   BP: 114/79   BP Location: Right arm   Patient Position: Sitting   Pulse: 90   Temp: 97.9 °F (36.6 °C)   SpO2: 99%   Weight: 110 kg (242 lb 3.2 oz)   Height: 180.3 cm (71\")   PainSc:   4     Body mass index is 33.78 kg/m².    Physical Exam  Constitutional:       Appearance: Normal appearance. He is obese.      Comments: Obesity with BMI of 33.78   HENT:      Head: Normocephalic and atraumatic.      Right Ear: External ear normal.      Left Ear: External ear normal.      Nose: Nose normal.   Eyes:      Extraocular Movements: Extraocular movements intact.      Conjunctiva/sclera: Conjunctivae normal.   Pulmonary:      Effort: Pulmonary effort is normal.   Musculoskeletal:         General: Normal range of motion.      Cervical back: Normal range of motion.   Skin:     General: Skin is warm and dry.   Neurological:      General: No focal deficit present.      Mental Status: He is alert and oriented to person, place, and time. Mental status is at baseline.   Psychiatric:         Mood and Affect: Mood normal.         Behavior: Behavior normal.         Thought Content: Thought content normal.         Judgment: Judgment normal.         Result Review :   The following data was reviewed by: ERUM Davila on 10/08/2021:        An A1c was collected on 9/27/2021 and was 12.38 indicating markedly uncontrolled type 2 diabetes.  This is up from a prior result of 11.5% and March of this year.    Most Recent A1C    HGBA1C Most Recent 9/27/21   Hemoglobin A1C 12.38 (A)   (A) Abnormal value              A1C Last 3 Results    HGBA1C Last 3 Results 11/10/20 3/24/21 9/27/21 "   Hemoglobin A1C 11.2 (A) 11.5 (A) 12.38 (A)   (A) Abnormal value       Comments are available for some flowsheets but are not being displayed.             Creatinine   Date Value Ref Range Status   09/27/2021 1.10 0.76 - 1.27 mg/dL Final   03/24/2021 1.06 0.70 - 1.20 mg/dL Final   11/10/2020 1.10 0.70 - 1.20 mg/dL Final       eGFR   Amer   Date Value Ref Range Status   09/27/2021 81 >60 mL/min/1.73 Final       Microalbumin, Urine   Date Value Ref Range Status   09/27/2021 <1.2 mg/dL Final       Labs collected on 9/27/2021 indicate normal renal function and negative urine microalbumin          Assessment: Uncontrolled dietary behavior is the most likely source of the patient's significant increase in his A1c.  Fortunately he is not yet experiencing any significant complications of his diabetes.  He feels he could benefit from some dietary instructions and diabetes education.      Diagnoses and all orders for this visit:    1. Uncontrolled type 2 diabetes mellitus with hyperglycemia (HCC) (Primary)  -     Ambulatory Referral to [facility]  -     Ambulatory Referral to [facility]    2. Obesity (BMI 30-39.9)    Other orders  -     Dulaglutide (Trulicity) 3 MG/0.5ML solution pen-injector; Inject 3 mg under the skin into the appropriate area as directed Every 7 (Seven) Days for 4 doses.  Dispense: 2 mL; Refill: 3        Plan: At this time we will increase his Trulicity to 3 mg once weekly.  The patient will finish using what he has on hand of the 1.5 mg doses and then will increase to the 3 mg dose.  He states he is getting a battery for his meter today so he can begin monitoring his glucose levels.  We will schedule the patient to be seen by the dietitian for nutrition counseling.  He will also be seen by the diabetes nurse educator for general diabetes counseling.    The patient will monitor his blood glucose levels 2 times each day.  If he develops hypoglycemia or  experiences any increased frequency or severity of hypoglycemia, he will contact the office for further instructions.        Follow Up     Return in about 2 months (around 12/8/2021) for Medication Management, DMNT, DSME.    Patient was given instructions and counseling regarding his condition or for health maintenance advice. Please see specific information pulled into the AVS if appropriate.     Debra Osman, APRN  10/08/2021

## 2021-10-14 ENCOUNTER — TELEPHONE (OUTPATIENT)
Dept: FAMILY MEDICINE CLINIC | Age: 66
End: 2021-10-14

## 2021-10-20 RX ORDER — AMLODIPINE BESYLATE 10 MG/1
10 TABLET ORAL DAILY
Qty: 90 TABLET | Refills: 0 | Status: SHIPPED | OUTPATIENT
Start: 2021-10-20 | End: 2022-01-19

## 2021-10-20 RX ORDER — POTASSIUM CHLORIDE 750 MG/1
10 TABLET, FILM COATED, EXTENDED RELEASE ORAL 2 TIMES DAILY
Qty: 180 TABLET | Refills: 0 | Status: SHIPPED | OUTPATIENT
Start: 2021-10-20 | End: 2022-01-25

## 2021-10-20 NOTE — TELEPHONE ENCOUNTER
Rx Refill Note  Requested Prescriptions     Pending Prescriptions Disp Refills   • amLODIPine (NORVASC) 10 MG tablet [Pharmacy Med Name: amLODIPine Besylate 10 MG Oral Tablet] 90 tablet 0     Sig: Take 1 tablet by mouth once daily   • potassium chloride 10 MEQ CR tablet [Pharmacy Med Name: POT CHLORIDE [id number]] 180 tablet 0     Sig: Take 1 tablet by mouth twice daily   • Januvia 100 MG tablet [Pharmacy Med Name: Januvia 100 MG Oral Tablet] 90 tablet 0     Sig: Take 1 tablet by mouth once daily      Last office visit with prescribing clinician: 9/27/2021      Next office visit with prescribing clinician: Visit date not found   Lab: Comprehensive Metabolic Panel (09/27/2021 09:24)  Lipid Panel (09/27/2021 09:24)    Fabiola Sousa LPN  10/20/21, 11:19 EDT

## 2021-10-28 ENCOUNTER — NUTRITION (OUTPATIENT)
Dept: DIABETES SERVICES | Facility: HOSPITAL | Age: 66
End: 2021-10-28

## 2021-10-28 DIAGNOSIS — E11.65 UNCONTROLLED TYPE 2 DIABETES MELLITUS WITH HYPERGLYCEMIA (HCC): Primary | ICD-10-CM

## 2021-10-28 PROCEDURE — 97802 MEDICAL NUTRITION INDIV IN: CPT | Performed by: DIETITIAN, REGISTERED

## 2021-11-01 VITALS — WEIGHT: 248.8 LBS | BODY MASS INDEX: 34.83 KG/M2 | HEIGHT: 71 IN

## 2021-11-01 NOTE — PROGRESS NOTES
"Maksim Acosta is a 66 y.o. male who presents to [facility] for nutrition consult r/t diagnosis of T2DM.  Maksim Acosta is referred by Debra Osman A*.     Past Medical History:   Diagnosis Date   • Acanthosis nigricans    • Chronic pain     LOW BACK    • Essential (primary) hypertension    • Glaucoma     DX'D IN 2009   • Hyperlipidemia, unspecified    • Hypokalemia    • Insomnia, unspecified    • Low back pain    • Nicotine dependence, cigarettes, in remission    • Obstructive sleep apnea (adult) (pediatric)     (+) SLEEP STUDY; USES CPAP   • Other symptoms and signs involving cognitive functions and awareness    • Pain in left leg    • Pain in left thigh    • Personal history of colonic polyps    • Psoriasis, unspecified    • Type 2 diabetes mellitus with hyperglycemia (HCC)     DX'D IN 2010   • Vitamin D deficiency        Anthropometrics  Ht Readings from Last 1 Encounters:   10/28/21 180.3 cm (71\")     Wt Readings from Last 1 Encounters:   10/28/21 113 kg (248 lb 12.8 oz)     Body mass index is 34.7 kg/m².    Diabetes History  Diabetes History  What type of diabetes do you have?: Type 2  Length of Diabetes Diagnosis: 10 + years  Current DM knowledge: good  Have you had diabetes education/teaching in the past?: yes  When and where was your diabetes education?: pt is followed by ERUM Morataya for diabetes med mgmt  Do you test your blood sugar at home?: yes  Frequency of checks: 2x/day  Who performs the test?: self  Typical readings: fasting avg 223; 2 hours after breakfast avg 227    Education Preferences  Education Preferences  What areas of diabetes would you like to learn about?: diet information    Nutrition Information  Nutrition Information  Enter everything you can remember eating in the last 24 hours (1 day): breakfast- sausage egg croissant; lunch/snack- cold cut sandwich w/ cheese; dinner- bean soup w/ cornbread; snacks- fruit; beverages- water, coffee w/ " creamer  How many meals do you eat each day?: 3  How many snacks do you eat each day?: 1  What is the biggest challenge you have with your diet?: Knowledge    Education Needs  DM Education Needs  Meter: Has own  Frequency of Testin times a day  Medication: Oral, Other injectables  Reducing Risks: A1C testing  Healthy Eating: RD consult, Reviewed meal plan, Basic meal plan provided  Physical Activity: Walking  Motivation: Engaged  Teaching Method: Explanation, Discussion, Handouts  Patient Response: Verbalized understanding    DM Goals  DM Goals  Healthy Eating - Goal: Today  Being Active - Goal: Today  Taking Medication - Goal: Today  Monitoring - Goal: Today  Contact Plan: Follow-up medical care (pt will continue f/u visits with ERUM Morataya)    Medications  Current Outpatient Medications   Medication Sig Dispense Refill   • amLODIPine (NORVASC) 10 MG tablet Take 1 tablet by mouth Daily. 90 tablet 0   • aspirin (EQ Aspirin Adult Low Dose) 81 MG EC tablet Take 1 tablet by mouth Daily. 90 tablet 3   • atorvastatin (LIPITOR) 20 MG tablet Take 1 tablet by mouth once daily 90 tablet 0   • cloNIDine (CATAPRES) 0.2 MG tablet Take 1 tablet by mouth 2 (Two) Times a Day. 180 tablet 0   • diclofenac-miSOPROStol (ARTHROTEC 75) 75-0.2 MG EC tablet Take 1 tablet by mouth 2 (Two) Times a Day.     • lisinopril-hydrochlorothiazide (PRINZIDE,ZESTORETIC) 20-12.5 MG per tablet Take 1 tablet by mouth 2 (Two) Times a Day. 180 tablet 0   • metFORMIN (GLUCOPHAGE) 1000 MG tablet Take 1 tablet by mouth 2 (Two) Times a Day. 180 tablet 1   • potassium chloride 10 MEQ CR tablet Take 1 tablet by mouth 2 (Two) Times a Day. 180 tablet 0   • SITagliptin (Januvia) 100 MG tablet Take 1 tablet by mouth Daily. 90 tablet 0   • traZODone (DESYREL) 100 MG tablet TAKE 1 TABLET BY MOUTH AT BEDTIME 90 tablet 0     No current facility-administered medications for this visit.       Labs   Lab Results   Component Value Date    HGBA1C 12.38 (H)  09/27/2021       Nutrition counseling provided on carbohydrate counting, portion control, measuring and reading labels. Discussed eating out and gave suggestions on controlling carbohydrate intake and making healthier food choices.     Meal Plan:   Total Carbohydrates per meal: 3-4 carb servings/meal, at least 3 meals/day  Lean protein with meals.  Limit added fats.  Snacks: 1 carbohydrate serving (</= 22 g) + 1 protein serving.     Daily exercise encouraged (as recommended by healthcare provider). Discussed the benefits of exercise in lowering blood glucose, blood pressure, cholesterol, stress and controlling body weight.     Advised to continue daily blood glucose monitoring to assist with understanding of factors affecting blood glucose and assist with management of diabetes.  Discussed and provided with target BG ranges.     Literature provided: Diabetes Nutrition Placemat, Choose Your Foods Booklet    Dietitian contact number provided.  Patient encouraged to call with questions or concerns.     Time spent with patient: 45 minutes      Rand Dang RDN, LD  10/28/2021

## 2021-11-08 RX ORDER — LANCETS
EACH MISCELLANEOUS
Qty: 100 EACH | Refills: 0 | Status: SHIPPED | OUTPATIENT
Start: 2021-11-08

## 2021-11-09 RX ORDER — BLOOD SUGAR DIAGNOSTIC
STRIP MISCELLANEOUS
Qty: 100 EACH | Refills: 0 | Status: SHIPPED | OUTPATIENT
Start: 2021-11-09

## 2021-11-12 ENCOUNTER — EDUCATION (OUTPATIENT)
Dept: DIABETES SERVICES | Facility: HOSPITAL | Age: 66
End: 2021-11-12

## 2021-11-12 DIAGNOSIS — IMO0002 DIABETES MELLITUS TYPE 2, UNCONTROLLED, WITH COMPLICATIONS: Primary | ICD-10-CM

## 2021-11-12 PROCEDURE — G0108 DIAB MANAGE TRN  PER INDIV: HCPCS

## 2021-11-12 NOTE — PROGRESS NOTES
Initial Visit and Education Plan:  Benjamin Acosta 66 y.o. presented to [facility] for initial self diabetes management education.  Patient states the reason for their visit is due to his blood glucose being out of control, is referred by Debra Osman A*.     Ht: 71 inches tall    Wt: 248 pounds    No Known Allergies     LABS:  Lab Results (most recent) Results for BENJAMIN ACOSTA (MRN [id number]) as of 11/12/2021 10:23   Ref. Range 3/24/2021 09:42 4/2/2021 10:42 9/27/2021 09:24   Glucose Latest Ref Range: 65 - 99 mg/dL 287 (H)  270 (H)   Sodium Latest Ref Range: 136 - 145 mmol/L 139  137   Potassium Latest Ref Range: 3.5 - 5.2 mmol/L 4.0  4.0   CO2 Latest Ref Range: 22.0 - 29.0 mmol/L   25.6   CO2 Latest Ref Range: 22 - 32 mmol/L 24     Chloride Latest Ref Range: 98 - 107 mmol/L 97 (L)  98   Anion Gap Latest Ref Range: 5.0 - 15.0 mmol/L 22 (H)  13.4   Creatinine Latest Ref Range: 0.76 - 1.27 mg/dL 1.06  1.10   BUN Latest Ref Range: 8 - 23 mg/dL 12  15   BUN/Creatinine Ratio Latest Ref Range: 7.0 - 25.0  11  13.6   Calcium Latest Ref Range: 8.6 - 10.5 mg/dL 9.9  10.4   eGFR African Am Latest Ref Range: >60 mL/min/1.73   81   Alkaline Phosphatase Latest Ref Range: 39 - 117 U/L 73  78   Total Protein Latest Ref Range: 6.0 - 8.5 g/dL 7.5  7.6   ALT (SGPT) Latest Ref Range: 1 - 41 U/L 22  18   AST (SGOT) Latest Ref Range: 1 - 40 U/L 19  17   Total Bilirubin Latest Ref Range: 0.0 - 1.2 mg/dL 0.52  0.5   Albumin Latest Ref Range: 3.50 - 5.20 g/dL 4.4  4.60   Globulin Latest Units: gm/dL 3.1  3.0   A/G Ratio Latest Units: g/dL 1.4  1.5   Hemoglobin A1C Latest Ref Range: 4.80 - 5.60 % 11.5 (H)  12.38 (H)   Total Cholesterol Latest Ref Range: 0 - 200 mg/dL 122  143   HDL Cholesterol Latest Ref Range: 40 - 60 mg/dL 40  39 (L)   LDL Cholesterol  Latest Ref Range: 0 - 100 mg/dL 49 (L)  68   VLDL Cholesterol Latest Ref Range: 5 - 40 mg/dL 33  36   Triglycerides Latest Ref Range: 0 - 150 mg/dL 163  (H)  222 (H)   Chol/HDL Ratio Latest Ref Range: 3.0 - 6.0 NA 3.1     LDL/HDL Ratio Unknown   1.53   Osmolality Calc Latest Ref Range: 273 - 304  298     PSA Latest Ref Range: 0.00 - 4.00 ng/mL 0.40                  Labs reviewed with patient.    Past Medical History:   Diagnosis Date   • Acanthosis nigricans    • Chronic pain     LOW BACK    • Essential (primary) hypertension    • Glaucoma     DX'D IN    • Hyperlipidemia, unspecified    • Hypokalemia    • Insomnia, unspecified    • Low back pain    • Nicotine dependence, cigarettes, in remission    • Obstructive sleep apnea (adult) (pediatric)     (+) SLEEP STUDY; USES CPAP   • Other symptoms and signs involving cognitive functions and awareness    • Pain in left leg    • Pain in left thigh    • Personal history of colonic polyps    • Psoriasis, unspecified    • Type 2 diabetes mellitus with hyperglycemia (HCC)     DX'D IN    • Vitamin D deficiency         Past Surgical History:   • COLONOSCOPY   • ROTATOR CUFF REPAIR   • SHOULDER ARTHROSCOPY        Social History     Tobacco Use   • Smoking status: Former Smoker     Years: 15.00     Types: Cigarettes     Quit date:      Years since quittin.8   • Smokeless tobacco: Never Used   Vaping Use   • Vaping Use: Never used   Substance Use Topics   • Alcohol use: Never     Comment: NON-DRINKER   • Drug use: Not Currently     Types: Marijuana     Comment: stopped [id number]         Family History   Problem Relation Age of Onset   • Stroke Mother    • Dementia Father    • Diabetes type II Father    • Heart attack Brother 71   • Cancer Brother         PHARYNX        Education Plan as follows:      Blood Glucose Monitoring Instructions and Plan:   We reviewed blood glucose testing and ADA recommended blood glucose level for fasting, pre and post meals. Patient demonstrates understanding and importance of testing blood glucose 1 time a day ; Patient will log BG results. Patient was given written material  including blood glucose log.     Initial Nutrition Instructions and Plan:   Patient was instructed and demonstrated reading a food label. Serving size and carbohydrate amounts were emphasized.   45-60 carbs per meal 3 meals a day  15-20 carbs per snacks up to 3 snacks per day.   Patient has not formally seen a dietitian. Patient was given written materials including Nutrition in the [employer].   [employer] explained and demonstrated to patient.     Medication Instructions and Plan:     Current Outpatient Medications:   •  Accu-Chek Softclix Lancets lancets, USE TO CHECK BLOOD SUGAR ONE TO TWO TIMES DAILY, Disp: 100 each, Rfl: 0  •  amLODIPine (NORVASC) 10 MG tablet, Take 1 tablet by mouth Daily., Disp: 90 tablet, Rfl: 0  •  aspirin (EQ Aspirin Adult Low Dose) 81 MG EC tablet, Take 1 tablet by mouth Daily., Disp: 90 tablet, Rfl: 3  •  atorvastatin (LIPITOR) 20 MG tablet, Take 1 tablet by mouth once daily, Disp: 90 tablet, Rfl: 0  •  cloNIDine (CATAPRES) 0.2 MG tablet, Take 1 tablet by mouth 2 (Two) Times a Day., Disp: 180 tablet, Rfl: 0  •  diclofenac-miSOPROStol (ARTHROTEC 75) 75-0.2 MG EC tablet, Take 1 tablet by mouth 2 (Two) Times a Day., Disp: , Rfl:   •  glucose blood (Accu-Chek Marisela Plus) test strip, USE STRIP TO CHECK GLUCOSE ONCE OR TWICE DAILY, Disp: 100 each, Rfl: 0  •  lisinopril-hydrochlorothiazide (PRINZIDE,ZESTORETIC) 20-12.5 MG per tablet, Take 1 tablet by mouth 2 (Two) Times a Day., Disp: 180 tablet, Rfl: 0  •  metFORMIN (GLUCOPHAGE) 1000 MG tablet, Take 1 tablet by mouth 2 (Two) Times a Day., Disp: 180 tablet, Rfl: 1  •  potassium chloride 10 MEQ CR tablet, Take 1 tablet by mouth 2 (Two) Times a Day., Disp: 180 tablet, Rfl: 0  •  SITagliptin (Januvia) 100 MG tablet, Take 1 tablet by mouth Daily., Disp: 90 tablet, Rfl: 0  •  traZODone (DESYREL) 100 MG tablet, TAKE 1 TABLET BY MOUTH AT BEDTIME, Disp: 90 tablet, Rfl: 0   Medication list was reviewed with patient. Patient denies questions or  concerns regarding current medication therapy. Patient was instructed to continue medications as prescribed.     Exercise:   Patient has been instructed to walk for 10 minutes after each meal initially and build strength and endurance to achieve 30 minutes of sustained exercise per day; recommended patient seek advice from Primary Care Provider prior to beginning any exercise program if other health concerns exist.     Problem solving and reducing risks:   I explained the importance of keeping provider appointments, taking medications as prescribed, having laboratory work performed as ordered by provider and seeking care as soon as possible when complications occur.     Patient Goal :  #1 to test his blood glucose one time a day  #2 to take medications as prescribed    Patient Ongoing Support Plan:  To read reputable diabetes websites and diabetes magazines.            Follow up Instructions and Plan: Patient is scheduled to return for follow-up visit with me in December. DSME staff will contact patient at 3 months and 6 months to evaluate patient's further needs regarding diabetes.      Other: Patient states he lives alone.  Patient states he also is planning on increasing his activity.    Start Time: 8: 3 5 AM  End Time: 9: 3 5 AM    Cathryn Miller RN, BSN  11/12/2021

## 2021-12-03 RX ORDER — LISINOPRIL AND HYDROCHLOROTHIAZIDE 20; 12.5 MG/1; MG/1
TABLET ORAL
Qty: 180 TABLET | Refills: 0 | Status: SHIPPED | OUTPATIENT
Start: 2021-12-03 | End: 2022-03-09

## 2021-12-03 RX ORDER — CLONIDINE HYDROCHLORIDE 0.2 MG/1
TABLET ORAL
Qty: 180 TABLET | Refills: 0 | Status: SHIPPED | OUTPATIENT
Start: 2021-12-03 | End: 2022-05-11 | Stop reason: SDUPTHER

## 2021-12-07 ENCOUNTER — TELEPHONE (OUTPATIENT)
Dept: FAMILY MEDICINE CLINIC | Age: 66
End: 2021-12-07

## 2021-12-07 NOTE — TELEPHONE ENCOUNTER
He said he rechecked his b/p 20 min later and it was 119/76, then checked it again 15 min later and it was 120/74. Told him A Cynthia would like him to come up to the office and let us check his b/p, he may need it adjusted. He said he doesn't have a car and cant come in. He will continue to monitor it and will let us know if it goes back down. Do you want to adjust his meds?

## 2021-12-07 NOTE — TELEPHONE ENCOUNTER
Pt called this morning and said his b/p is 97/63, its never been that low. Ask if he had any other symptoms, he said no.   Pt said he takes Lisinopril/ hctz 20/12.5mg twice daily, Clonidine 0.2mg twice daily and Amlodipine 10mg once daily.   Advised him not to take his b/p medicine until I talk to A Marie. He said he already took the Clonidine and Lisinopril/ hctz this morning. Told him to drink plenty of fluids and I will call him back after taking to his provider.

## 2021-12-08 NOTE — TELEPHONE ENCOUNTER
Pt said his b/p today was 121/81 and he feels fine. Told pt to continue to monitor his b/p and let us know if he has any problems.

## 2021-12-10 ENCOUNTER — OFFICE VISIT (OUTPATIENT)
Dept: DIABETES SERVICES | Facility: CLINIC | Age: 66
End: 2021-12-10

## 2021-12-10 VITALS
HEART RATE: 98 BPM | HEIGHT: 71 IN | WEIGHT: 252 LBS | TEMPERATURE: 97.5 F | SYSTOLIC BLOOD PRESSURE: 132 MMHG | OXYGEN SATURATION: 96 % | BODY MASS INDEX: 35.28 KG/M2 | RESPIRATION RATE: 24 BRPM | DIASTOLIC BLOOD PRESSURE: 92 MMHG

## 2021-12-10 DIAGNOSIS — E11.65 TYPE 2 DIABETES MELLITUS WITH HYPERGLYCEMIA, WITHOUT LONG-TERM CURRENT USE OF INSULIN (HCC): Primary | ICD-10-CM

## 2021-12-10 DIAGNOSIS — E66.01 SEVERE OBESITY (BMI 35.0-39.9) WITH COMORBIDITY (HCC): ICD-10-CM

## 2021-12-10 LAB
EXPIRATION DATE: ABNORMAL
HBA1C MFR BLD: 10.6 %
Lab: ABNORMAL

## 2021-12-10 PROCEDURE — 99214 OFFICE O/P EST MOD 30 MIN: CPT | Performed by: NURSE PRACTITIONER

## 2021-12-10 PROCEDURE — 83036 HEMOGLOBIN GLYCOSYLATED A1C: CPT | Performed by: NURSE PRACTITIONER

## 2021-12-10 PROCEDURE — 3046F HEMOGLOBIN A1C LEVEL >9.0%: CPT | Performed by: NURSE PRACTITIONER

## 2021-12-10 RX ORDER — DULAGLUTIDE 3 MG/.5ML
INJECTION, SOLUTION SUBCUTANEOUS
COMMUNITY
End: 2022-05-02

## 2021-12-10 NOTE — PROGRESS NOTES
Chief Complaint  Diabetes (follow up and med refills)    Referred By: No ref. provider found    Subjective          Maksim Acosta presents to [facility] for diabetes medication management    History of Present Illness    Visit type:  follow-up  Diabetes type:  Type 2  Current diabetes status/concerns/issues: He denies any specific concerns regarding his diabetes today other than persistently elevated glucose levels  Other health concerns: No health changes since last being seen  Diabetes symptoms:    Polyuria: No   Polydipsia: No   Polyphagia: No   Blurred vision: No   Excessive fatigue: No  Diabetes complications:  Neuropathy:No  Nephropathy:No  Retinopathy:No  Amputation/Wounds:No  Gastroparesis:No  Cardiovascular Disease: Yes, hypertension and hyperlipidemia  Erectile Dysfunction:Yes per patient  Hypoglycemia:  None reported at this time  Hypoglycemia Symptoms:  No hypoglycemia at this time  Current diabetes treatment:  Januvia 100 mg once a day, metformin 1000 mg twice a day, and Trulicity 3 mg once weekly, which was increased at his last appointment from 1.5 mg once weekly. He has not yet started the 3 mg dose yet. He reports he still has 3 injections remaining of the 1.5 strength.  Blood glucose device:  Meter  Blood glucose monitoring frequency:  1  Blood glucose range/average: He is testing fasting each morning and they are staying in the 200s on most occasions  Diet:  Avoids high carb/sweet foods, Diet drinks only  Activity/Exercise:  He is walking in stairclimbing for approximately 30 minutes each day now    Past Medical History:   Diagnosis Date   • Acanthosis nigricans    • Chronic pain     LOW BACK    • Essential (primary) hypertension    • Glaucoma     DX'D IN 2009   • Hyperlipidemia, unspecified    • Hypokalemia    • Insomnia, unspecified    • Low back pain    • Nicotine dependence, cigarettes, in remission    • Obstructive sleep apnea (adult) (pediatric)     (+) SLEEP  STUDY; USES CPAP   • Other symptoms and signs involving cognitive functions and awareness    • Pain in left leg    • Pain in left thigh    • Personal history of colonic polyps    • Psoriasis, unspecified    • Type 2 diabetes mellitus with hyperglycemia (HCC)     DX'D IN    • Vitamin D deficiency      Past Surgical History:   Procedure Laterality Date   • COLONOSCOPY     • ROTATOR CUFF REPAIR Right    • SHOULDER ARTHROSCOPY Right 2021     Family History   Problem Relation Age of Onset   • Stroke Mother    • Dementia Father    • Diabetes type II Father    • Heart attack Brother 71   • Cancer Brother         PHARYNX     Social History     Socioeconomic History   • Marital status:    • Number of children: 2   Tobacco Use   • Smoking status: Former Smoker     Years: 15.00     Types: Cigarettes     Quit date:      Years since quittin.9   • Smokeless tobacco: Never Used   Vaping Use   • Vaping Use: Never used   Substance and Sexual Activity   • Alcohol use: Never     Comment: NON-DRINKER   • Drug use: Not Currently     Types: Marijuana     Comment: stopped     • Sexual activity: Defer     No Known Allergies    Current Outpatient Medications:   •  Accu-Chek Softclix Lancets lancets, USE TO CHECK BLOOD SUGAR ONE TO TWO TIMES DAILY, Disp: 100 each, Rfl: 0  •  amLODIPine (NORVASC) 10 MG tablet, Take 1 tablet by mouth Daily., Disp: 90 tablet, Rfl: 0  •  aspirin (EQ Aspirin Adult Low Dose) 81 MG EC tablet, Take 1 tablet by mouth Daily., Disp: 90 tablet, Rfl: 3  •  atorvastatin (LIPITOR) 20 MG tablet, Take 1 tablet by mouth once daily, Disp: 90 tablet, Rfl: 0  •  cloNIDine (CATAPRES) 0.2 MG tablet, Take 1 tablet by mouth twice daily, Disp: 180 tablet, Rfl: 0  •  diclofenac-miSOPROStol (ARTHROTEC 75) 75-0.2 MG EC tablet, Take 1 tablet by mouth 2 (Two) Times a Day., Disp: , Rfl:   •  Dulaglutide (Trulicity) 3 MG/0.5ML solution pen-injector, Inject  under the skin into the appropriate area as  directed., Disp: , Rfl:   •  glucose blood (Accu-Chek Marisela Plus) test strip, USE STRIP TO CHECK GLUCOSE ONCE OR TWICE DAILY, Disp: 100 each, Rfl: 0  •  lisinopril-hydrochlorothiazide (PRINZIDE,ZESTORETIC) 20-12.5 MG per tablet, Take 1 tablet by mouth twice daily, Disp: 180 tablet, Rfl: 0  •  metFORMIN (GLUCOPHAGE) 1000 MG tablet, Take 1 tablet by mouth 2 (Two) Times a Day., Disp: 180 tablet, Rfl: 1  •  potassium chloride 10 MEQ CR tablet, Take 1 tablet by mouth 2 (Two) Times a Day., Disp: 180 tablet, Rfl: 0  •  SITagliptin (Januvia) 100 MG tablet, Take 1 tablet by mouth Daily., Disp: 90 tablet, Rfl: 0  •  traZODone (DESYREL) 100 MG tablet, TAKE 1 TABLET BY MOUTH AT BEDTIME, Disp: 90 tablet, Rfl: 0    Review of Systems   Constitutional: Positive for fatigue. Negative for activity change, appetite change, fever, unexpected weight gain and unexpected weight loss.   HENT: Negative for congestion, ear pain, facial swelling, hearing loss, sore throat and tinnitus.    Eyes: Negative for blurred vision, double vision, redness and visual disturbance.   Respiratory: Negative for cough, shortness of breath and wheezing.    Cardiovascular: Negative for chest pain, palpitations and leg swelling.   Gastrointestinal: Negative for abdominal distention, constipation, diarrhea, nausea, vomiting, GERD and indigestion.   Endocrine: Negative for polydipsia, polyphagia and polyuria.   Genitourinary: Negative for difficulty urinating, frequency and urgency.   Musculoskeletal: Positive for back pain. Negative for gait problem and myalgias.   Skin: Negative for rash, skin lesions and bruise.   Neurological: Negative for seizures, speech difficulty, weakness, headache and confusion.   Psychiatric/Behavioral: Negative for sleep disturbance, depressed mood and stress. The patient is not nervous/anxious.         Objective     Vitals:    12/10/21 0911   BP: 132/92   BP Location: Right arm   Patient Position: Sitting   Cuff Size: Adult   Pulse:  "98   Resp: 24   Temp: 97.5 °F (36.4 °C)   SpO2: 96%   Weight: 114 kg (252 lb)   Height: 180.3 cm (71\")   PainSc:   8     Body mass index is 35.15 kg/m².    Physical Exam  Constitutional:       Appearance: Normal appearance. He is obese.      Comments: Severe obesity with comorbidities of hypertension, hyperlipidemia, diabetes with BMI of 35.1   HENT:      Head: Normocephalic and atraumatic.      Right Ear: External ear normal.      Left Ear: External ear normal.      Nose: Nose normal.   Eyes:      Extraocular Movements: Extraocular movements intact.      Conjunctiva/sclera: Conjunctivae normal.   Pulmonary:      Effort: Pulmonary effort is normal.   Musculoskeletal:         General: Normal range of motion.      Cervical back: Normal range of motion.   Skin:     General: Skin is warm and dry.   Neurological:      General: No focal deficit present.      Mental Status: He is alert and oriented to person, place, and time. Mental status is at baseline.   Psychiatric:         Mood and Affect: Mood normal.         Behavior: Behavior normal.         Thought Content: Thought content normal.         Judgment: Judgment normal.         Result Review :   The following data was reviewed by: ERUM Davila on 12/10/2021:    Point-of-care A1c collected in the office today was 10.6% indicating uncontrolled type 2 diabetes.  This is, however, a significant improvement in his A1c down from 12.38 in September.    Most Recent A1C    HGBA1C Most Recent 12/10/21   Hemoglobin A1C 10.6             A1C Last 3 Results    HGBA1C Last 3 Results 3/24/21 9/27/21 12/10/21   Hemoglobin A1C 11.5 (A) 12.38 (A) 10.6   (A) Abnormal value       Comments are available for some flowsheets but are not being displayed.                     Assessment: The patient has had improvement in his A1c most likely due to dietary changes as he has not yet started the increased dose of his Trulicity.      Diagnoses and all orders for this visit:    1. Type " 2 diabetes mellitus with hyperglycemia, without long-term current use of insulin (HCC) (Primary)  -     POC Glycosylated Hemoglobin (Hb A1C)    2. Severe obesity (BMI 35.0-39.9) with comorbidity (HCC)        Plan: The patient will increase the Trulicity after consuming his current supply of the 1.5 mg dose.  This was prescribed at his last visit but he had yet to increase the dose because he had a significant amount on hand.  He will contact our office if he has an adverse reaction to the increase in the medication.  He will focus on dietary strategies to control both weight and glucose levels.    The patient will monitor his blood glucose levels 1-2 times each day.  If he develops problematic hyperglycemia or hypoglycemia or adverse drug reaction, he will contact the office for further instructions.        Follow Up     Return in about 3 months (around 3/10/2022) for Medication Management.    Patient was given instructions and counseling regarding his condition or for health maintenance advice. Please see specific information pulled into the AVS if appropriate.     Debra Osman, ERUM  12/10/2021

## 2021-12-14 ENCOUNTER — OFFICE VISIT (OUTPATIENT)
Dept: FAMILY MEDICINE CLINIC | Age: 66
End: 2021-12-14

## 2021-12-14 VITALS
BODY MASS INDEX: 34.73 KG/M2 | DIASTOLIC BLOOD PRESSURE: 86 MMHG | WEIGHT: 249 LBS | HEART RATE: 90 BPM | SYSTOLIC BLOOD PRESSURE: 124 MMHG

## 2021-12-14 DIAGNOSIS — E11.65 TYPE 2 DIABETES MELLITUS WITH HYPERGLYCEMIA, WITHOUT LONG-TERM CURRENT USE OF INSULIN (HCC): ICD-10-CM

## 2021-12-14 DIAGNOSIS — R42 DIZZINESS: ICD-10-CM

## 2021-12-14 DIAGNOSIS — I10 ESSENTIAL (PRIMARY) HYPERTENSION: Primary | ICD-10-CM

## 2021-12-14 PROCEDURE — 99213 OFFICE O/P EST LOW 20 MIN: CPT | Performed by: NURSE PRACTITIONER

## 2021-12-14 NOTE — ASSESSMENT & PLAN NOTE
Lower clonidine to 1/2 dose BID, may need to lower the norvasc, log sheet given, reviewed labs from 9-2021, send for ER records

## 2021-12-14 NOTE — PROGRESS NOTES
Maksim Acosta presents to [facility].    Chief Complaint:  Hospital Follow Up Visit ([facility] 12/12/21 with low blood pressure)         History of Present Illness:  ER follow up  Low blood pressure 12-12-21 dizziness (that has been going on intermittently over the last week)   Given IV fluids   Per pt they owered clonidine dose if lower systolic BP of 120 to take half a dose in the am, he takes that BID    He called here on 12-7-21:   Pt called this morning and said his b/p is 97/63, its never been that low. Ask if he had any other symptoms, he said no.   Pt said he takes Lisinopril/ hctz 20/12.5mg twice daily, Clonidine 0.2mg twice daily and Amlodipine 10mg once daily.   Advised him not to take his b/p medicine until I talk to A Marie. He said he already took the Clonidine and Lisinopril/ hctz this morning. Told him to drink plenty of fluids and I will call him back after taking to his provider.     Diabetes:  Current medication sees nurse educator and ANYA mccord/ seen last week, on trulicity, metformin, januvia   Tolerating medication: Yes  Last eye exam 1-3-21    At home BS ranges: low 200's   Lab Results       Component                Value               Date                       HGBA1C                   10.6                12/10/2021                Hypertension:  Current medication amlodipine 10 mg, lisinopril HCTZ 20-12.5 BID, clonidine 0.2 BID  Tolerating Medication: getting light headed   Checking BP at home and it is  over 57-86    Labs   Lab Results       Component                Value               Date                       GLUCOSE                  270 (H)             09/27/2021                 BUN                      15                  09/27/2021                 CREATININE               1.10                09/27/2021                 EGFRIFAFRI               81                  09/27/2021                 BCR                      13.6                09/27/2021                  K                        4.0                 2021                 CO2                      25.6                2021                 CALCIUM                  10.4                2021                 ALBUMIN                  4.60                2021                 LABIL2                   1.4                 2021                 AST                      17                  2021                 ALT                      18                  2021                Past Medical History: changes in the last six months: no surgery or hospitalizations         Hyperlipidemia    Hypertension     Sleep Apnea: (+) sleep study; uses CPAP;     Chronic Pain: affecting the low back;     Type 2 Diabetes: dx'd in ;     Glaucoma: dx'd in ;         PREVENTIVE HEALTH MAINTENANCE             COLORECTAL CANCER SCREENING: Up to date (colonoscopy q10y; sigmoidoscopy q5y; Cologuard q3y) was last done 19, Results are in chart; colonoscopy with the following abnormalities noted-- Diverticulosis; The next colonoscopy is due  ; 2014, pt. has consultation scheduled in May       Hepatitis C Medicare Screening: was last done 17; negative         PAST MEDICAL HISTORY             CURRENT MEDICAL PROVIDERS:    Dentist: Dr. Cabrera    Neurologist: Sushila Barclay    Ophthalmologist: Dr. Bowling    Pulmonologist: Dr. Fischer - [place]             ADVANCED DIRECTIVES: None         Surgical History:         Procedures:    Colonoscopy         Family History:     Father:  at age 83;  Dementia;  Type 2 Diabetes     Mother:  at age 51; Cause of death was CVA     Brother(s): 3 brother(s) total; 2 ;  Myocardial Infarction (  71 );  cancer of pharynx     Sister(s): 2 sister(s) total; 2 ;   at birth     Daughter(s): 2 daughter(s) total         Social History:     Occupation: Disabled (due to low back pain)     Marital Status:      Children: 2 children          Review of Systems:  Review of Systems   Constitutional: Negative for fatigue and fever.   Respiratory: Negative for cough and shortness of breath.    Cardiovascular: Negative for chest pain, palpitations and leg swelling.   Neurological: Negative for numbness.          Current Outpatient Medications:   •  amLODIPine (NORVASC) 10 MG tablet, Take 1 tablet by mouth Daily., Disp: 90 tablet, Rfl: 0  •  aspirin (EQ Aspirin Adult Low Dose) 81 MG EC tablet, Take 1 tablet by mouth Daily., Disp: 90 tablet, Rfl: 3  •  atorvastatin (LIPITOR) 20 MG tablet, Take 1 tablet by mouth once daily, Disp: 90 tablet, Rfl: 0  •  cloNIDine (CATAPRES) 0.2 MG tablet, Take 1 tablet by mouth twice daily, Disp: 180 tablet, Rfl: 0  •  diclofenac-miSOPROStol (ARTHROTEC 75) 75-0.2 MG EC tablet, Take 1 tablet by mouth 2 (Two) Times a Day., Disp: , Rfl:   •  Dulaglutide (Trulicity) 3 MG/0.5ML solution pen-injector, Inject  under the skin into the appropriate area as directed., Disp: , Rfl:   •  lisinopril-hydrochlorothiazide (PRINZIDE,ZESTORETIC) 20-12.5 MG per tablet, Take 1 tablet by mouth twice daily, Disp: 180 tablet, Rfl: 0  •  metFORMIN (GLUCOPHAGE) 1000 MG tablet, Take 1 tablet by mouth 2 (Two) Times a Day., Disp: 180 tablet, Rfl: 1  •  potassium chloride 10 MEQ CR tablet, Take 1 tablet by mouth 2 (Two) Times a Day., Disp: 180 tablet, Rfl: 0  •  SITagliptin (Januvia) 100 MG tablet, Take 1 tablet by mouth Daily., Disp: 90 tablet, Rfl: 0  •  traZODone (DESYREL) 100 MG tablet, TAKE 1 TABLET BY MOUTH AT BEDTIME, Disp: 90 tablet, Rfl: 0  •  Accu-Chek Softclix Lancets lancets, USE TO CHECK BLOOD SUGAR ONE TO TWO TIMES DAILY, Disp: 100 each, Rfl: 0  •  glucose blood (Accu-Chek Marisela Plus) test strip, USE STRIP TO CHECK GLUCOSE ONCE OR TWICE DAILY, Disp: 100 each, Rfl: 0    Vital Signs:   Vitals:    12/14/21 1256   BP: 124/86   BP Location: Right arm   Patient Position: Sitting   Pulse: 90   Weight: 113 kg (249 lb)         Physical  Exam:  Physical Exam  Vitals reviewed.   Constitutional:       General: He is not in acute distress.     Appearance: Normal appearance.   Neck:      Vascular: No carotid bruit.   Cardiovascular:      Rate and Rhythm: Normal rate and regular rhythm.      Heart sounds: Normal heart sounds. No murmur heard.      Pulmonary:      Effort: Pulmonary effort is normal. No respiratory distress.      Breath sounds: Normal breath sounds.   Musculoskeletal:      Right lower leg: No edema.      Left lower leg: No edema.   Neurological:      General: No focal deficit present.      Mental Status: He is alert.   Psychiatric:         Mood and Affect: Mood normal.         Behavior: Behavior normal.         Result Review      The following data was reviewed by: ERUM Charles on 12/14/2021:    Results for orders placed or performed in visit on 12/10/21   POC Glycosylated Hemoglobin (Hb A1C)    Specimen: Blood   Result Value Ref Range    Hemoglobin A1C 10.6 %    Lot Number [id number]     Expiration Date 09/30/23                Assessment and Plan:          Diagnoses and all orders for this visit:    1. Essential (primary) hypertension (Primary)  Assessment & Plan:  Lower clonidine to 1/2 dose BID, may need to lower the norvasc, log sheet given, reviewed labs from 9-2021, send for ER records       2. Type 2 diabetes mellitus with hyperglycemia, without long-term current use of insulin (HCC)  Assessment & Plan:  Keep his follow up appt later this month with nurse educator, reviewed ANYA Osman recent last note       3. Dizziness  Assessment & Plan:  Stay well hydrated, send for last for ER note from [facility]           Follow Up   Return if symptoms worsen or fail to improve.  Patient was given instructions and counseling regarding his condition or for health maintenance advice. Please see specific information pulled into the AVS if appropriate.

## 2021-12-14 NOTE — ASSESSMENT & PLAN NOTE
Keep his follow up appt later this month with nurse educator, reviewed ANYA Osman recent last note

## 2021-12-17 ENCOUNTER — APPOINTMENT (OUTPATIENT)
Dept: DIABETES SERVICES | Facility: HOSPITAL | Age: 66
End: 2021-12-17

## 2022-01-10 ENCOUNTER — OFFICE VISIT (OUTPATIENT)
Dept: URBAN - METROPOLITAN AREA CLINIC 71 | Facility: CLINIC | Age: 67
End: 2022-01-10
Payer: COMMERCIAL

## 2022-01-10 DIAGNOSIS — H17.89 OTHER CORNEAL SCARS AND OPACITIES: ICD-10-CM

## 2022-01-10 PROCEDURE — 99213 OFFICE O/P EST LOW 20 MIN: CPT | Performed by: OPTOMETRIST

## 2022-01-10 RX ORDER — LATANOPROST 50 UG/ML
0.005 % SOLUTION OPHTHALMIC
Qty: 7.5 | Refills: 1 | Status: ACTIVE
Start: 2022-01-10

## 2022-01-10 ASSESSMENT — KERATOMETRY
OS: 41.38
OD: 41.75

## 2022-01-10 ASSESSMENT — INTRAOCULAR PRESSURE
OS: 9
OD: 9

## 2022-01-10 NOTE — IMPRESSION/PLAN
Impression: Open angle with borderline findings, high risk, bilateral: H40.023. OU. vs early OAG. OCT 02/01/21: maybe worse superiorly OU. VF 03/26/21: WNL/stable OU. IOP OU good today. Target: Mid teens. Pt reports he does have sleep apnea, is going to be going to [place] in the near future for sleep study. Plan: Discussed. Continue Latanoprost QHS OU. Next DE/VF/OCT due 03/2022.

## 2022-01-10 NOTE — IMPRESSION/PLAN
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

## 2022-01-19 RX ORDER — AMLODIPINE BESYLATE 10 MG/1
TABLET ORAL
Qty: 90 TABLET | Refills: 0 | Status: SHIPPED | OUTPATIENT
Start: 2022-01-19 | End: 2022-05-04

## 2022-01-25 RX ORDER — POTASSIUM CHLORIDE 750 MG/1
TABLET, FILM COATED, EXTENDED RELEASE ORAL
Qty: 180 TABLET | Refills: 0 | Status: SHIPPED | OUTPATIENT
Start: 2022-01-25 | End: 2022-05-09

## 2022-01-25 RX ORDER — SITAGLIPTIN 100 MG/1
TABLET, FILM COATED ORAL
Qty: 90 TABLET | Refills: 0 | Status: SHIPPED | OUTPATIENT
Start: 2022-01-25 | End: 2022-04-22

## 2022-02-14 RX ORDER — ATORVASTATIN CALCIUM 20 MG/1
TABLET, FILM COATED ORAL
Qty: 90 TABLET | Refills: 0 | Status: SHIPPED | OUTPATIENT
Start: 2022-02-14 | End: 2022-05-11 | Stop reason: SDUPTHER

## 2022-02-16 RX ORDER — TRAZODONE HYDROCHLORIDE 100 MG/1
TABLET ORAL
Qty: 90 TABLET | Refills: 0 | Status: SHIPPED | OUTPATIENT
Start: 2022-02-16 | End: 2022-05-11 | Stop reason: SDUPTHER

## 2022-03-07 DIAGNOSIS — I10 ESSENTIAL (PRIMARY) HYPERTENSION: Primary | ICD-10-CM

## 2022-03-09 RX ORDER — LISINOPRIL AND HYDROCHLOROTHIAZIDE 20; 12.5 MG/1; MG/1
1 TABLET ORAL 2 TIMES DAILY
Qty: 180 TABLET | Refills: 0 | Status: SHIPPED | OUTPATIENT
Start: 2022-03-09 | End: 2022-05-11 | Stop reason: SDUPTHER

## 2022-03-09 NOTE — TELEPHONE ENCOUNTER
Rx Refill Note  Requested Prescriptions     Pending Prescriptions Disp Refills   • lisinopril-hydrochlorothiazide (PRINZIDE,ZESTORETIC) 20-12.5 MG per tablet [Pharmacy Med Name: Lisinopril-hydroCHLOROthiazide 20-12.5 MG Oral Tablet] 180 tablet 0     Sig: Take 1 tablet by mouth twice daily      Last office visit with prescribing clinician: 12/14/2021      Next office visit with prescribing clinician: Visit date not found  Last fill: 12/03/21, #180  Lab: Comprehensive Metabolic Panel (09/27/2021 09:24)      Fabiola Sousa LPN  03/09/22, 16:23 EST

## 2022-03-17 ENCOUNTER — TELEPHONE (OUTPATIENT)
Dept: FAMILY MEDICINE CLINIC | Age: 67
End: 2022-03-17

## 2022-03-17 NOTE — TELEPHONE ENCOUNTER
He sees ANYA Osman, has an appt next week with her, fwd this to her please, she can discuss with pt and respond to his insurance if she deems necessary

## 2022-03-17 NOTE — TELEPHONE ENCOUNTER
Caller: Maksim Acosta    Relationship: Self    Best call back number: [phone]     Who are you requesting to speak with (clinical staff, provider,  specific staff member): EURM DELUNA    What was the call regarding: PATIENT IS TAKING Dulaglutide (Trulicity) 3 MG/0.5ML solution pen-injector AND Januvia 100 MG tablet CURRENTLY. HE SPOKE WITH HIS INSURANCE AND THEY VERBALIZED THAT THOSE TWO MEDICATIONS SHOULD NOT BE TAKEN AT THE SAME TIME. HE WOULD LIKE TO KNOW IF ERUM SINGLETONA DELUNA STILL WANTS HIM TAKING BOTH MEDICATIONS. PLEASE CALL PATIENT TO ADVISE.

## 2022-04-05 DIAGNOSIS — E11.65 TYPE 2 DIABETES MELLITUS WITH HYPERGLYCEMIA, WITHOUT LONG-TERM CURRENT USE OF INSULIN: Primary | ICD-10-CM

## 2022-04-22 ENCOUNTER — OFFICE VISIT (OUTPATIENT)
Dept: DIABETES SERVICES | Facility: CLINIC | Age: 67
End: 2022-04-22

## 2022-04-22 VITALS
OXYGEN SATURATION: 97 % | DIASTOLIC BLOOD PRESSURE: 86 MMHG | HEART RATE: 95 BPM | WEIGHT: 258 LBS | SYSTOLIC BLOOD PRESSURE: 135 MMHG | TEMPERATURE: 97.8 F | BODY MASS INDEX: 36.12 KG/M2 | HEIGHT: 71 IN

## 2022-04-22 DIAGNOSIS — E11.65 TYPE 2 DIABETES MELLITUS WITH HYPERGLYCEMIA, WITHOUT LONG-TERM CURRENT USE OF INSULIN: Primary | ICD-10-CM

## 2022-04-22 DIAGNOSIS — E66.01 SEVERE OBESITY (BMI 35.0-39.9) WITH COMORBIDITY: ICD-10-CM

## 2022-04-22 LAB
EXPIRATION DATE: ABNORMAL
HBA1C MFR BLD: 9.4 %
Lab: ABNORMAL

## 2022-04-22 PROCEDURE — 99214 OFFICE O/P EST MOD 30 MIN: CPT | Performed by: NURSE PRACTITIONER

## 2022-04-22 RX ORDER — DAPAGLIFLOZIN 10 MG/1
10 TABLET, FILM COATED ORAL DAILY
Qty: 30 TABLET | Refills: 5 | Status: SHIPPED | OUTPATIENT
Start: 2022-04-22 | End: 2022-11-28

## 2022-04-22 NOTE — PROGRESS NOTES
"Chief Complaint  Diabetes (Things are going about the same, pt reports there are times he is very dizzy \" like drunk feeling\" )    Referred By: No ref. provider found    Subjective          Maksim Acosta presents to [facility] for diabetes medication management    History of Present Illness    Visit type:  follow-up  Diabetes type:  Type 2  Current diabetes status/concerns/issues: He has increase his Trulicity to 3 mg once weekly and is tolerating the medication without any difficulties.  Other health concerns: He has been having some episodes of dizziness and they have made adjustments to his clonidine to see if this will help resolve the issue.  Diabetes symptoms:    Polyuria: No   Polydipsia: No   Polyphagia: No   Blurred vision: No   Excessive fatigue: Yes  Diabetes complications:  Neuropathy:No  Nephropathy:No  Retinopathy:No  Amputation/Wounds:No  Gastroparesis:No  Cardiovascular Disease: Yes, hypertension and hyperlipidemia  Erectile Dysfunction:Yes per patient  Hypoglycemia:  None reported at this time  Hypoglycemia Symptoms:  No hypoglycemia at this time  Current diabetes treatment:  Januvia 100 mg once a day, metformin 1000 mg twice a day, and Trulicity 3 mg once weekly  Blood glucose device:  Meter  Blood glucose monitoring frequency:  2  Blood glucose range/average:  134-175 per patient report  Diet:  Avoids high carb/sweet foods, Diet drinks only  Activity/Exercise:  Walking    Past Medical History:   Diagnosis Date   • Acanthosis nigricans    • Chronic pain     LOW BACK    • Essential (primary) hypertension    • Glaucoma     DX'D IN 2009   • Hyperlipidemia, unspecified    • Hypokalemia    • Insomnia, unspecified    • Low back pain    • Nicotine dependence, cigarettes, in remission    • Obstructive sleep apnea (adult) (pediatric)     (+) SLEEP STUDY; USES CPAP   • Other symptoms and signs involving cognitive functions and awareness    • Pain in left leg    • Pain in left " thigh    • Personal history of colonic polyps    • Psoriasis, unspecified    • Type 2 diabetes mellitus with hyperglycemia (HCC)     DX'D IN 2010   • Vitamin D deficiency      Past Surgical History:   Procedure Laterality Date   • COLONOSCOPY     • ROTATOR CUFF REPAIR Right    • SHOULDER ARTHROSCOPY Right 05/11/2021     Family History   Problem Relation Age of Onset   • Stroke Mother    • Dementia Father    • Diabetes type II Father    • Heart attack Brother 71   • Cancer Brother         PHARYNX     Social History     Socioeconomic History   • Marital status:    • Number of children: 2   Tobacco Use   • Smoking status: Former Smoker     Years: 15.00     Types: Cigarettes     Quit date: 2012     Years since quitting: 10.3   • Smokeless tobacco: Never Used   Vaping Use   • Vaping Use: Never used   Substance and Sexual Activity   • Alcohol use: Never     Comment: NON-DRINKER   • Drug use: Not Currently     Types: Marijuana     Comment: stopped febuary2020    • Sexual activity: Defer     No Known Allergies    Current Outpatient Medications:   •  Accu-Chek Softclix Lancets lancets, USE TO CHECK BLOOD SUGAR ONE TO TWO TIMES DAILY, Disp: 100 each, Rfl: 0  •  amLODIPine (NORVASC) 10 MG tablet, Take 1 tablet by mouth once daily, Disp: 90 tablet, Rfl: 0  •  aspirin (EQ Aspirin Adult Low Dose) 81 MG EC tablet, Take 1 tablet by mouth Daily., Disp: 90 tablet, Rfl: 3  •  atorvastatin (LIPITOR) 20 MG tablet, Take 1 tablet by mouth once daily, Disp: 90 tablet, Rfl: 0  •  cloNIDine (CATAPRES) 0.2 MG tablet, Take 1 tablet by mouth twice daily (Patient taking differently: 0.1 mg. .1mg 2x's daily), Disp: 180 tablet, Rfl: 0  •  diclofenac-miSOPROStol (ARTHROTEC 75) 75-0.2 MG EC tablet, Take 1 tablet by mouth 2 (Two) Times a Day., Disp: , Rfl:   •  Dulaglutide (Trulicity) 3 MG/0.5ML solution pen-injector, Inject  under the skin into the appropriate area as directed., Disp: , Rfl:   •  glucose blood (Accu-Chek Marisela Plus) test  strip, USE STRIP TO CHECK GLUCOSE ONCE OR TWICE DAILY, Disp: 100 each, Rfl: 0  •  lisinopril-hydrochlorothiazide (PRINZIDE,ZESTORETIC) 20-12.5 MG per tablet, Take 1 tablet by mouth 2 (Two) Times a Day. Due for appt in June. Call to schedule, Disp: 180 tablet, Rfl: 0  •  metFORMIN (GLUCOPHAGE) 1000 MG tablet, Take 1 tablet by mouth twice daily, Disp: 180 tablet, Rfl: 0  •  potassium chloride 10 MEQ CR tablet, Take 1 tablet by mouth twice daily, Disp: 180 tablet, Rfl: 0  •  traZODone (DESYREL) 100 MG tablet, TAKE 1 TABLET BY MOUTH AT BEDTIME, Disp: 90 tablet, Rfl: 0  •  Dapagliflozin Propanediol (Farxiga) 10 MG tablet, Take 10 mg by mouth Daily for 30 days., Disp: 30 tablet, Rfl: 5    Review of Systems   Constitutional: Positive for fatigue. Negative for activity change, appetite change, fever, unexpected weight gain and unexpected weight loss.   HENT: Negative for congestion, ear pain, facial swelling, hearing loss, sore throat and tinnitus.    Eyes: Negative for blurred vision, double vision, redness and visual disturbance.   Respiratory: Negative for cough, shortness of breath and wheezing.    Cardiovascular: Negative for chest pain, palpitations and leg swelling.   Gastrointestinal: Negative for abdominal distention, constipation, diarrhea, nausea, vomiting, GERD and indigestion.   Endocrine: Negative for polydipsia, polyphagia and polyuria.   Genitourinary: Negative for difficulty urinating, frequency and urgency.   Musculoskeletal: Positive for back pain, myalgias and neck pain. Negative for gait problem.   Skin: Negative for rash, skin lesions and wound.   Neurological: Positive for dizziness and headache. Negative for seizures, speech difficulty, weakness and confusion.   Psychiatric/Behavioral: Negative for sleep disturbance, depressed mood and stress. The patient is not nervous/anxious.         Objective     Vitals:    04/22/22 0823   BP: 135/86   BP Location: Right arm   Patient Position: Sitting   Cuff  "Size: Adult   Pulse: 95   Temp: 97.8 °F (36.6 °C)   SpO2: 97%   Weight: 117 kg (258 lb)   Height: 180.3 cm (71\")   PainSc: 10-Worst pain ever     Body mass index is 35.98 kg/m².    Physical Exam  Constitutional:       Appearance: Normal appearance. He is obese.      Comments: Severe obesity with comorbidities of hyperlipidemia, hypertension, sleep apnea, diabetes with BMI of 35.98   HENT:      Head: Normocephalic and atraumatic.      Right Ear: External ear normal.      Left Ear: External ear normal.      Nose: Nose normal.   Eyes:      Extraocular Movements: Extraocular movements intact.      Conjunctiva/sclera: Conjunctivae normal.   Pulmonary:      Effort: Pulmonary effort is normal.   Musculoskeletal:         General: Normal range of motion.      Cervical back: Normal range of motion.   Skin:     General: Skin is warm and dry.   Neurological:      General: No focal deficit present.      Mental Status: He is alert and oriented to person, place, and time. Mental status is at baseline.   Psychiatric:         Mood and Affect: Mood normal.         Behavior: Behavior normal.         Thought Content: Thought content normal.         Judgment: Judgment normal.         Result Review :   The following data was reviewed by: ERUM Davila on 04/22/2022:    Point-of-care A1c collected in the office today was 9.4% indicating uncontrolled type 2 diabetes.  This is down from the prior result of 10.6 collected in December 2021    Most Recent A1C    HGBA1C Most Recent 4/22/22   Hemoglobin A1C 9.4             A1C Last 3 Results    HGBA1C Last 3 Results 9/27/21 12/10/21 4/22/22   Hemoglobin A1C 12.38 (A) 10.6 9.4   (A) Abnormal value                  Assessment: The patient has a slow progressive improvement in his A1c although he remains well above target.  He plans to start increasing his physical activity now that the weather has improved and anticipates this will help bring his blood sugars down.      Diagnoses and " all orders for this visit:    1. Type 2 diabetes mellitus with hyperglycemia, without long-term current use of insulin (HCC) (Primary)  -     POC Glycosylated Hemoglobin (Hb A1C)    2. Severe obesity (BMI 35.0-39.9) with comorbidity (HCC)    Other orders  -     Dapagliflozin Propanediol (Farxiga) 10 MG tablet; Take 10 mg by mouth Daily for 30 days.  Dispense: 30 tablet; Refill: 5        Plan: Since the patient is tolerating the Trulicity without difficulty we will discontinue the Januvia as it is a duplicate therapy.  We will add Farxiga 10 mg once a day to his current treatment plan.  Patient was advised of the potential adverse reactions to this medication and will contact the office should he experience any of those.  He will otherwise be scheduled for 3-month follow-up    The patient will monitor his blood glucose levels 2 times a day.  If he develops problematic hyperglycemia or hypoglycemia or adverse drug reaction, he will contact the office for further instructions.        Follow Up     Return in about 3 months (around 7/22/2022) for Medication Management.    Patient was given instructions and counseling regarding his condition or for health maintenance advice. Please see specific information pulled into the AVS if appropriate.     Debra Osman, APRN  04/22/2022

## 2022-04-22 NOTE — PATIENT INSTRUCTIONS
Stop the Januvia    Start Farxiga 10 mg once a day each morning    Continue the Trulicity and metformin as previously prescribed

## 2022-05-02 RX ORDER — DULAGLUTIDE 3 MG/.5ML
INJECTION, SOLUTION SUBCUTANEOUS
Qty: 4 ML | Refills: 2 | Status: SHIPPED | OUTPATIENT
Start: 2022-05-02 | End: 2022-05-08 | Stop reason: SDUPTHER

## 2022-05-03 DIAGNOSIS — I10 ESSENTIAL (PRIMARY) HYPERTENSION: Primary | ICD-10-CM

## 2022-05-04 RX ORDER — AMLODIPINE BESYLATE 10 MG/1
TABLET ORAL
Qty: 90 TABLET | Refills: 0 | Status: SHIPPED | OUTPATIENT
Start: 2022-05-04 | End: 2022-05-11 | Stop reason: SDUPTHER

## 2022-05-04 NOTE — TELEPHONE ENCOUNTER
Rx Refill Note  Requested Prescriptions     Pending Prescriptions Disp Refills   • amLODIPine (NORVASC) 10 MG tablet [Pharmacy Med Name: amLODIPine Besylate 10 MG Oral Tablet] 90 tablet 0     Sig: Take 1 tablet by mouth once daily      Last office visit with prescribing clinician: 12/14/2021      Next office visit with prescribing clinician: Visit date not found  Lab:Comprehensive Metabolic Panel (09/27/2021 09:24)  Next appt due in June.     Fabiola Sousa LPN  05/04/22, 08:06 EDT

## 2022-05-08 RX ORDER — DULAGLUTIDE 3 MG/.5ML
3 INJECTION, SOLUTION SUBCUTANEOUS
Qty: 6 ML | Refills: 1 | Status: SHIPPED | OUTPATIENT
Start: 2022-05-08 | End: 2022-11-14

## 2022-05-09 RX ORDER — POTASSIUM CHLORIDE 750 MG/1
TABLET, FILM COATED, EXTENDED RELEASE ORAL
Qty: 60 TABLET | Refills: 0 | Status: SHIPPED | OUTPATIENT
Start: 2022-05-09 | End: 2022-05-11 | Stop reason: SDUPTHER

## 2022-05-09 NOTE — TELEPHONE ENCOUNTER
Rx Refill Note  Requested Prescriptions     Pending Prescriptions Disp Refills   • potassium chloride 10 MEQ CR tablet [Pharmacy Med Name: Potassium Chloride ER 10 MEQ Oral Tablet Extended Release] 180 tablet 0     Sig: Take 1 tablet by mouth twice daily      Last office visit with prescribing clinician: 12/14/2021      Next office visit with prescribing clinician: Visit date not found   Brenda Genao LPN  05/09/22, 10:27 EDT       LF-1/25/22 #180    Lab Results   Component Value Date    GLUCOSE 270 (H) 09/27/2021    BUN 15 09/27/2021    CREATININE 1.10 09/27/2021    EGFRIFAFRI 81 09/27/2021    BCR 13.6 09/27/2021    K 4.0 09/27/2021    CO2 25.6 09/27/2021    CALCIUM 10.4 09/27/2021    ALBUMIN 4.60 09/27/2021    LABIL2 1.4 03/24/2021    AST 17 09/27/2021    ALT 18 09/27/2021

## 2022-05-11 ENCOUNTER — LAB (OUTPATIENT)
Dept: LAB | Facility: HOSPITAL | Age: 67
End: 2022-05-11

## 2022-05-11 ENCOUNTER — OFFICE VISIT (OUTPATIENT)
Dept: FAMILY MEDICINE CLINIC | Age: 67
End: 2022-05-11

## 2022-05-11 VITALS
DIASTOLIC BLOOD PRESSURE: 88 MMHG | WEIGHT: 250 LBS | BODY MASS INDEX: 35 KG/M2 | HEIGHT: 71 IN | SYSTOLIC BLOOD PRESSURE: 122 MMHG | TEMPERATURE: 98.4 F | HEART RATE: 86 BPM

## 2022-05-11 DIAGNOSIS — E78.5 HYPERLIPIDEMIA, UNSPECIFIED HYPERLIPIDEMIA TYPE: ICD-10-CM

## 2022-05-11 DIAGNOSIS — E11.65 TYPE 2 DIABETES MELLITUS WITH HYPERGLYCEMIA, WITHOUT LONG-TERM CURRENT USE OF INSULIN: ICD-10-CM

## 2022-05-11 DIAGNOSIS — I10 ESSENTIAL (PRIMARY) HYPERTENSION: Primary | ICD-10-CM

## 2022-05-11 DIAGNOSIS — G47.00 INSOMNIA, UNSPECIFIED TYPE: ICD-10-CM

## 2022-05-11 LAB
ALBUMIN SERPL-MCNC: 4.4 G/DL (ref 3.5–5.2)
ALBUMIN/GLOB SERPL: 1.4 G/DL
ALP SERPL-CCNC: 79 U/L (ref 39–117)
ALT SERPL W P-5'-P-CCNC: 24 U/L (ref 1–41)
ANION GAP SERPL CALCULATED.3IONS-SCNC: 12.2 MMOL/L (ref 5–15)
AST SERPL-CCNC: 18 U/L (ref 1–40)
BILIRUB SERPL-MCNC: 0.5 MG/DL (ref 0–1.2)
BUN SERPL-MCNC: 15 MG/DL (ref 8–23)
BUN/CREAT SERPL: 12.6 (ref 7–25)
CALCIUM SPEC-SCNC: 10 MG/DL (ref 8.6–10.5)
CHLORIDE SERPL-SCNC: 102 MMOL/L (ref 98–107)
CHOLEST SERPL-MCNC: 97 MG/DL (ref 0–200)
CO2 SERPL-SCNC: 21.8 MMOL/L (ref 22–29)
CREAT SERPL-MCNC: 1.19 MG/DL (ref 0.76–1.27)
EGFRCR SERPLBLD CKD-EPI 2021: 67.4 ML/MIN/1.73
GLOBULIN UR ELPH-MCNC: 3.2 GM/DL
GLUCOSE SERPL-MCNC: 276 MG/DL (ref 65–99)
HDLC SERPL-MCNC: 33 MG/DL (ref 40–60)
LDLC SERPL CALC-MCNC: 30 MG/DL (ref 0–100)
LDLC/HDLC SERPL: 0.65 {RATIO}
POTASSIUM SERPL-SCNC: 3.8 MMOL/L (ref 3.5–5.2)
PROT SERPL-MCNC: 7.6 G/DL (ref 6–8.5)
SODIUM SERPL-SCNC: 136 MMOL/L (ref 136–145)
TRIGL SERPL-MCNC: 213 MG/DL (ref 0–150)
VLDLC SERPL-MCNC: 34 MG/DL (ref 5–40)

## 2022-05-11 PROCEDURE — 80061 LIPID PANEL: CPT | Performed by: NURSE PRACTITIONER

## 2022-05-11 PROCEDURE — 99214 OFFICE O/P EST MOD 30 MIN: CPT | Performed by: NURSE PRACTITIONER

## 2022-05-11 PROCEDURE — 80053 COMPREHEN METABOLIC PANEL: CPT | Performed by: NURSE PRACTITIONER

## 2022-05-11 PROCEDURE — 36415 COLL VENOUS BLD VENIPUNCTURE: CPT | Performed by: NURSE PRACTITIONER

## 2022-05-11 RX ORDER — POTASSIUM CHLORIDE 750 MG/1
10 TABLET, FILM COATED, EXTENDED RELEASE ORAL 2 TIMES DAILY
Qty: 180 TABLET | Refills: 1 | Status: SHIPPED | OUTPATIENT
Start: 2022-05-11 | End: 2023-02-10

## 2022-05-11 RX ORDER — TRAZODONE HYDROCHLORIDE 100 MG/1
100 TABLET ORAL
Qty: 90 TABLET | Refills: 1 | Status: SHIPPED | OUTPATIENT
Start: 2022-05-11 | End: 2022-10-31

## 2022-05-11 RX ORDER — LISINOPRIL AND HYDROCHLOROTHIAZIDE 20; 12.5 MG/1; MG/1
1 TABLET ORAL 2 TIMES DAILY
Qty: 180 TABLET | Refills: 1 | Status: SHIPPED | OUTPATIENT
Start: 2022-05-11 | End: 2022-10-27

## 2022-05-11 RX ORDER — ATORVASTATIN CALCIUM 20 MG/1
20 TABLET, FILM COATED ORAL DAILY
Qty: 90 TABLET | Refills: 1 | Status: SHIPPED | OUTPATIENT
Start: 2022-05-11 | End: 2023-02-13

## 2022-05-11 RX ORDER — CLONIDINE HYDROCHLORIDE 0.2 MG/1
TABLET ORAL
Qty: 45 TABLET | Refills: 1 | Status: SHIPPED | OUTPATIENT
Start: 2022-05-11

## 2022-05-11 RX ORDER — AMLODIPINE BESYLATE 10 MG/1
10 TABLET ORAL DAILY
Qty: 90 TABLET | Refills: 1 | Status: SHIPPED | OUTPATIENT
Start: 2022-05-11

## 2022-05-11 NOTE — PROGRESS NOTES
Maksim Acosta presents to [facility].    Chief Complaint:  Diabetes (Follow up) and Hypertension (Follow up) hyperlipidemia follow up          History of Present Illness:  Diabetes:  Current medication: V paddy/ sees her/ he is on trulicity, metformin/ recently added farxiga   Tolerating medication: Yes  Last eye exam: less than a year, Johnny   Last foot exam: > 1 year ago   At home BS ranges: not checking recently was running between 130-170   Lab Results       Component                Value               Date                       HGBA1C                   9.4                 04/22/2022              Hypertension:  Current medication: norvasc, clonidine, lisinopril HCTZ  Tolerating Medication: Yes  Checking BP at home and it is: 120's / 80's  Needs refills: Yes, [facility]   Labs:  Lab Results       Component                Value               Date                       GLUCOSE                  270 (H)             09/27/2021                 BUN                      15                  09/27/2021                 CREATININE               1.10                09/27/2021                 EGFRIFAFRI               81                  09/27/2021                 BCR                      13.6                09/27/2021                 K                        4.0                 09/27/2021                 CO2                      25.6                09/27/2021                 CALCIUM                  10.4                09/27/2021                 ALBUMIN                  4.60                09/27/2021                 LABIL2                   1.4                 03/24/2021                 AST                      17                  09/27/2021                 ALT                      18                  09/27/2021              Hyperlipidemia  Current medication: lipitor  Tolerating medication: Yes  Needs Refill: Yes [facility]     Lab Results       Component                Value               Date                        CHOL                     143                 2021                 CHLPL                    122                 2021                 TRIG                     222 (H)             2021                 HDL                      39 (L)              2021                 LDL                      68                  2021                  Past Medical History since : none         Hyperlipidemia    Hypertension     Sleep Apnea: (+) sleep study; uses CPAP;     Chronic Pain: affecting the low back;     Type 2 Diabetes: dx'd in ;     Glaucoma: dx'd in ;         PREVENTIVE HEALTH MAINTENANCE             COLORECTAL CANCER SCREENING: Up to date (colonoscopy q10y; sigmoidoscopy q5y; Cologuard q3y) was last done 19, Results are in chart; colonoscopy with the following abnormalities noted-- Diverticulosis; The next colonoscopy is due  ; 2014, pt. has consultation scheduled in May       Hepatitis C Medicare Screening: was last done 17; negative         PAST MEDICAL HISTORY             CURRENT MEDICAL PROVIDERS:    Dentist: Dr. Cabrera    Neurologist: Sushila Barclay    Ophthalmologist: Dr. Bowling    Pulmonologist: Dr. Fischer - [place]             ADVANCED DIRECTIVES: None         Surgical History:         Procedures:    Colonoscopy         Family History:     Father:  at age 83;  Dementia;  Type 2 Diabetes     Mother:  at age 51; Cause of death was CVA     Brother(s): 3 brother(s) total; 2 ;  Myocardial Infarction (  71 );  cancer of pharynx     Sister(s): 2 sister(s) total; 2 ;   at birth     Daughter(s): 2 daughter(s) total         Social History:     Occupation: Disabled (due to low back pain)     Marital Status:      Children: 2 children         Review of Systems:  Review of Systems   Constitutional: Negative for fatigue and fever.   Respiratory: Negative for cough and shortness of breath.    Cardiovascular:  Negative for chest pain, palpitations and leg swelling.   Neurological: Negative for numbness.   Psychiatric/Behavioral: Positive for sleep disturbance (trazodone helps, needs a refill ).          Current Outpatient Medications:   •  Accu-Chek Softclix Lancets lancets, USE TO CHECK BLOOD SUGAR ONE TO TWO TIMES DAILY, Disp: 100 each, Rfl: 0  •  amLODIPine (NORVASC) 10 MG tablet, Take 1 tablet by mouth Daily., Disp: 90 tablet, Rfl: 1  •  aspirin (EQ Aspirin Adult Low Dose) 81 MG EC tablet, Take 1 tablet by mouth Daily., Disp: 90 tablet, Rfl: 3  •  atorvastatin (LIPITOR) 20 MG tablet, Take 1 tablet by mouth Daily., Disp: 90 tablet, Rfl: 1  •  cloNIDine (CATAPRES) 0.2 MG tablet, Half pill BID, Disp: 45 tablet, Rfl: 1  •  Dapagliflozin Propanediol (Farxiga) 10 MG tablet, Take 10 mg by mouth Daily for 30 days., Disp: 30 tablet, Rfl: 5  •  diclofenac-miSOPROStol (ARTHROTEC 75) 75-0.2 MG EC tablet, Take 1 tablet by mouth 2 (Two) Times a Day., Disp: , Rfl:   •  Dulaglutide (Trulicity) 3 MG/0.5ML solution pen-injector, Inject 0.5 mL under the skin into the appropriate area as directed Every 7 (Seven) Days., Disp: 6 mL, Rfl: 1  •  glucose blood (Accu-Chek Marisela Plus) test strip, USE STRIP TO CHECK GLUCOSE ONCE OR TWICE DAILY, Disp: 100 each, Rfl: 0  •  lisinopril-hydrochlorothiazide (PRINZIDE,ZESTORETIC) 20-12.5 MG per tablet, Take 1 tablet by mouth 2 (Two) Times a Day. Due for appt in June. Call to schedule, Disp: 180 tablet, Rfl: 1  •  metFORMIN (GLUCOPHAGE) 1000 MG tablet, Take 1 tablet by mouth twice daily, Disp: 180 tablet, Rfl: 0  •  potassium chloride 10 MEQ CR tablet, Take 1 tablet by mouth 2 (Two) Times a Day., Disp: 180 tablet, Rfl: 1  •  traZODone (DESYREL) 100 MG tablet, Take 1 tablet by mouth every night at bedtime., Disp: 90 tablet, Rfl: 1    Vital Signs:   Vitals:    05/11/22 0924   BP: 122/88   BP Location: Left arm   Patient Position: Sitting   Cuff Size: Adult   Pulse: 86   Temp: 98.4 °F (36.9 °C)   TempSrc:  "Oral   Weight: 113 kg (250 lb)   Height: 180.3 cm (70.98\")         Physical Exam:  Physical Exam  Constitutional:       Appearance: Normal appearance.   Neck:      Vascular: No carotid bruit.   Cardiovascular:      Rate and Rhythm: Normal rate and regular rhythm.      Pulses:           Posterior tibial pulses are 2+ on the right side and 2+ on the left side.      Heart sounds: No murmur heard.  Pulmonary:      Effort: No respiratory distress.      Breath sounds: Normal breath sounds.   Musculoskeletal:         General: No swelling.   Feet:      Right foot:      Protective Sensation: 3 sites tested. 3 sites sensed.      Skin integrity: Skin integrity normal. No ulcer or blister.      Toenail Condition: Right toenails are normal.      Left foot:      Protective Sensation: 3 sites tested. 3 sites sensed.      Skin integrity: Skin integrity normal. No ulcer or blister.      Toenail Condition: Left toenails are abnormally thick.      Comments: Diabetic Foot Exam Performed and Monofilament Test Performed     Neurological:      Mental Status: He is alert.   Psychiatric:         Mood and Affect: Mood normal.         Thought Content: Thought content normal.         Result Review      The following data was reviewed by: ERUM Charles on 05/11/2022:    Results for orders placed or performed in visit on 04/22/22   POC Glycosylated Hemoglobin (Hb A1C)    Specimen: Blood   Result Value Ref Range    Hemoglobin A1C 9.4 %    Lot Number [id number]     Expiration Date 01/31/24                Assessment and Plan:          Diagnoses and all orders for this visit:    1. Essential (primary) hypertension (Primary)  Assessment & Plan:  Hypertension is stable. Continue to monitor BP at home. Continue current meds. Continue to modify diet and lifestyle. Will need labs every 6 months and follow up.       Orders:  -     amLODIPine (NORVASC) 10 MG tablet; Take 1 tablet by mouth Daily.  Dispense: 90 tablet; Refill: 1  -     cloNIDine " (CATAPRES) 0.2 MG tablet; Half pill BID  Dispense: 45 tablet; Refill: 1  -     lisinopril-hydrochlorothiazide (PRINZIDE,ZESTORETIC) 20-12.5 MG per tablet; Take 1 tablet by mouth 2 (Two) Times a Day. Due for appt in June. Call to schedule  Dispense: 180 tablet; Refill: 1  -     potassium chloride 10 MEQ CR tablet; Take 1 tablet by mouth 2 (Two) Times a Day.  Dispense: 180 tablet; Refill: 1    2. Hyperlipidemia, unspecified hyperlipidemia type  Assessment & Plan:  Continue current medication and efforts with diet and exercise.       Orders:  -     atorvastatin (LIPITOR) 20 MG tablet; Take 1 tablet by mouth Daily.  Dispense: 90 tablet; Refill: 1    3. Type 2 diabetes mellitus with hyperglycemia, without long-term current use of insulin (HCC)  Assessment & Plan:  Monitor his sugars, check his feet daily; keep follow up appt with ANYA Osman     Orders:  -     Comprehensive Metabolic Panel  -     Lipid Panel    4. Insomnia, unspecified type  -     traZODone (DESYREL) 100 MG tablet; Take 1 tablet by mouth every night at bedtime.  Dispense: 90 tablet; Refill: 1        Follow Up   Return in about 6 months (around 11/11/2022).  Patient was given instructions and counseling regarding his condition or for health maintenance advice. Please see specific information pulled into the AVS if appropriate.

## 2022-05-11 NOTE — ASSESSMENT & PLAN NOTE
Continue current medication and efforts with diet and exercise.     
Hypertension is stable. Continue to monitor BP at home. Continue current meds. Continue to modify diet and lifestyle. Will need labs every 6 months and follow up.     
Monitor his sugars, check his feet daily; keep follow up appt with ANYA Osman   
WDL

## 2022-07-26 DIAGNOSIS — E11.65 TYPE 2 DIABETES MELLITUS WITH HYPERGLYCEMIA, WITHOUT LONG-TERM CURRENT USE OF INSULIN: ICD-10-CM

## 2022-09-23 ENCOUNTER — OFFICE VISIT (OUTPATIENT)
Dept: DIABETES SERVICES | Facility: CLINIC | Age: 67
End: 2022-09-23

## 2022-09-23 VITALS
OXYGEN SATURATION: 98 % | HEART RATE: 85 BPM | DIASTOLIC BLOOD PRESSURE: 82 MMHG | HEIGHT: 71 IN | SYSTOLIC BLOOD PRESSURE: 117 MMHG | BODY MASS INDEX: 34.72 KG/M2 | WEIGHT: 248 LBS

## 2022-09-23 DIAGNOSIS — E11.65 UNCONTROLLED TYPE 2 DIABETES MELLITUS WITH HYPERGLYCEMIA: Primary | ICD-10-CM

## 2022-09-23 DIAGNOSIS — E66.9 OBESITY (BMI 30-39.9): ICD-10-CM

## 2022-09-23 DIAGNOSIS — E11.65 TYPE 2 DIABETES MELLITUS WITH HYPERGLYCEMIA, WITHOUT LONG-TERM CURRENT USE OF INSULIN: ICD-10-CM

## 2022-09-23 LAB
EXPIRATION DATE: ABNORMAL
HBA1C MFR BLD: 8.3 %
Lab: ABNORMAL

## 2022-09-23 PROCEDURE — 99213 OFFICE O/P EST LOW 20 MIN: CPT | Performed by: NURSE PRACTITIONER

## 2022-09-23 NOTE — PROGRESS NOTES
Chief Complaint  Diabetes (blood sugars are reading a little higher than what he likes, his food stamps were cut and this has played a big part with his meals)    Referred By: No ref. provider found    Subjective          Maksim Acosta presents to [facility] for diabetes medication management    History of Present Illness    Visit type:  follow-up  Diabetes type:  Type 2  Current diabetes status/concerns/issues: He states his glucose levels have been improved.  He is tolerating the addition of the Farxiga without difficulty.  Other health concerns: He did have a problem with his food stamps for a brief interval time and this cut back on his food supply.  He states this has since been resolved and he has no food concerns.  He has had a 10 pound weight loss since his last appointment  Diabetes symptoms:    Polyuria: No   Polydipsia: Yes   Polyphagia: No   Blurred vision: No   Excessive fatigue: No  Diabetes complications:  Neuro: None  Renal: Stage II renal disease  Eyes: None  Amputation/Wounds: None  GI: None  Cardiovascular: Hypertension and hyperlipidemia  ED: Per patient  Other: None  Hypoglycemia:  None reported at this time  Hypoglycemia Symptoms:  No hypoglycemia at this time  Current diabetes treatment:  metformin 1000 mg twice a day and Trulicity 3 mg once weekly, Farxiga 10 mg was added at the last appointment  Blood glucose device:  Meter  Blood glucose monitoring frequency:  1  Blood glucose range/average:  130-140  Diet:  Limits high carb/sweet foods, Avoids sugary drinks  Activity/Exercise:  walking and steps    Past Medical History:   Diagnosis Date   • Acanthosis nigricans    • Chronic pain     LOW BACK    • Essential (primary) hypertension    • Glaucoma     DX'D IN 2009   • Hyperlipidemia, unspecified    • Hypokalemia    • Insomnia, unspecified    • Low back pain    • Nicotine dependence, cigarettes, in remission    • Obstructive sleep apnea (adult) (pediatric)      (+) SLEEP STUDY; USES CPAP   • Other symptoms and signs involving cognitive functions and awareness    • Pain in left leg    • Pain in left thigh    • Personal history of colonic polyps    • Psoriasis, unspecified    • Type 2 diabetes mellitus with hyperglycemia (HCC)     DX'D IN 2010   • Vitamin D deficiency      Past Surgical History:   Procedure Laterality Date   • COLONOSCOPY     • ROTATOR CUFF REPAIR Right    • SHOULDER ARTHROSCOPY Right 05/11/2021     Family History   Problem Relation Age of Onset   • Stroke Mother    • Dementia Father    • Diabetes type II Father    • Heart attack Brother 71   • Cancer Brother         PHARYNX     Social History     Socioeconomic History   • Marital status:    • Number of children: 2   Tobacco Use   • Smoking status: Former Smoker     Years: 15.00     Types: Cigarettes     Quit date: 2012     Years since quitting: 10.7   • Smokeless tobacco: Never Used   Vaping Use   • Vaping Use: Never used   Substance and Sexual Activity   • Alcohol use: Never     Comment: NON-DRINKER   • Drug use: Not Currently     Types: Marijuana     Comment: stopped febuary2020    • Sexual activity: Defer     No Known Allergies    Current Outpatient Medications:   •  Accu-Chek Softclix Lancets lancets, USE TO CHECK BLOOD SUGAR ONE TO TWO TIMES DAILY, Disp: 100 each, Rfl: 0  •  amLODIPine (NORVASC) 10 MG tablet, Take 1 tablet by mouth Daily., Disp: 90 tablet, Rfl: 1  •  aspirin (EQ Aspirin Adult Low Dose) 81 MG EC tablet, Take 1 tablet by mouth Daily., Disp: 90 tablet, Rfl: 3  •  atorvastatin (LIPITOR) 20 MG tablet, Take 1 tablet by mouth Daily., Disp: 90 tablet, Rfl: 1  •  cloNIDine (CATAPRES) 0.2 MG tablet, Half pill BID, Disp: 45 tablet, Rfl: 1  •  diclofenac-miSOPROStol (ARTHROTEC 75) 75-0.2 MG EC tablet, Take 1 tablet by mouth 2 (Two) Times a Day., Disp: , Rfl:   •  Dulaglutide (Trulicity) 3 MG/0.5ML solution pen-injector, Inject 0.5 mL under the skin into the appropriate area as directed  Every 7 (Seven) Days., Disp: 6 mL, Rfl: 1  •  glucose blood (Accu-Chek Marisela Plus) test strip, USE STRIP TO CHECK GLUCOSE ONCE OR TWICE DAILY, Disp: 100 each, Rfl: 0  •  lisinopril-hydrochlorothiazide (PRINZIDE,ZESTORETIC) 20-12.5 MG per tablet, Take 1 tablet by mouth 2 (Two) Times a Day. Due for appt in June. Call to schedule, Disp: 180 tablet, Rfl: 1  •  metFORMIN (GLUCOPHAGE) 1000 MG tablet, Take 1 tablet by mouth twice daily, Disp: 180 tablet, Rfl: 1  •  potassium chloride 10 MEQ CR tablet, Take 1 tablet by mouth 2 (Two) Times a Day., Disp: 180 tablet, Rfl: 1  •  traZODone (DESYREL) 100 MG tablet, Take 1 tablet by mouth every night at bedtime., Disp: 90 tablet, Rfl: 1  •  Dapagliflozin Propanediol (Farxiga) 10 MG tablet, Take 10 mg by mouth Daily for 30 days., Disp: 30 tablet, Rfl: 5    Review of Systems   Constitutional: Positive for unexpected weight loss (10 pounds). Negative for activity change, appetite change, fatigue, fever and unexpected weight gain.   HENT: Negative for congestion, ear pain, facial swelling, hearing loss, sore throat and tinnitus.    Eyes: Negative for blurred vision, double vision, redness and visual disturbance.   Respiratory: Negative for cough, shortness of breath and wheezing.    Cardiovascular: Negative for chest pain, palpitations and leg swelling.   Gastrointestinal: Negative for abdominal distention, constipation, diarrhea, nausea, vomiting, GERD and indigestion.   Endocrine: Positive for polydipsia. Negative for polyphagia and polyuria.   Genitourinary: Negative for difficulty urinating, frequency and urgency.   Musculoskeletal: Positive for arthralgias, back pain, myalgias and neck pain. Negative for gait problem.   Skin: Negative for rash, skin lesions and wound.   Neurological: Negative for seizures, speech difficulty, weakness, headache and confusion.   Psychiatric/Behavioral: Negative for sleep disturbance, depressed mood and stress. The patient is not nervous/anxious.   "       Objective     Vitals:    09/23/22 0910   BP: 117/82   BP Location: Right arm   Patient Position: Sitting   Cuff Size: Adult   Pulse: 85   SpO2: 98%   Weight: 112 kg (248 lb)   Height: 180.3 cm (71\")   PainSc:   9     Body mass index is 34.59 kg/m².    Physical Exam  Constitutional:       Appearance: Normal appearance. He is obese.      Comments: Obesity with BMI of 34.59   HENT:      Head: Normocephalic and atraumatic.      Right Ear: External ear normal.      Left Ear: External ear normal.      Nose: Nose normal.   Eyes:      Extraocular Movements: Extraocular movements intact.      Conjunctiva/sclera: Conjunctivae normal.   Pulmonary:      Effort: Pulmonary effort is normal.   Musculoskeletal:         General: Normal range of motion.      Cervical back: Normal range of motion.   Skin:     General: Skin is warm and dry.   Neurological:      General: No focal deficit present.      Mental Status: He is alert and oriented to person, place, and time. Mental status is at baseline.   Psychiatric:         Mood and Affect: Mood normal.         Behavior: Behavior normal.         Thought Content: Thought content normal.         Judgment: Judgment normal.         Result Review :   The following data was reviewed by: ERUM Davila on 09/23/2022:    Most Recent A1C    HGBA1C Most Recent 9/23/22   Hemoglobin A1C 8.3             A1C Last 3 Results    HGBA1C Last 3 Results 12/10/21 4/22/22 9/23/22   Hemoglobin A1C 10.6 9.4 8.3           Point-of-care A1c collected in the office today was 8.3% indicating uncontrolled type 2 diabetes.  This is down from the prior result of 9.4 collected in April of this year.    Creatinine   Date Value Ref Range Status   05/11/2022 1.19 0.76 - 1.27 mg/dL Final   09/27/2021 1.10 0.76 - 1.27 mg/dL Final     eGFR   Date Value Ref Range Status   05/11/2022 67.4 >60.0 mL/min/1.73 Final     Comment:     National Kidney Foundation and American Society of Nephrology (ASN) Task Force " recommended calculation based on the Chronic Kidney Disease Epidemiology Collaboration (CKD-EPI) equation refit without adjustment for race.     Labs collected on 5/11/2022 show stage II renal disease    Total Cholesterol   Date Value Ref Range Status   05/11/2022 97 0 - 200 mg/dL Final   09/27/2021 143 0 - 200 mg/dL Final     Triglycerides   Date Value Ref Range Status   05/11/2022 213 (H) 0 - 150 mg/dL Final   09/27/2021 222 (H) 0 - 150 mg/dL Final     HDL Cholesterol   Date Value Ref Range Status   05/11/2022 33 (L) 40 - 60 mg/dL Final   09/27/2021 39 (L) 40 - 60 mg/dL Final     LDL Cholesterol    Date Value Ref Range Status   05/11/2022 30 0 - 100 mg/dL Final   09/27/2021 68 0 - 100 mg/dL Final     Labs collected on 5/11/2022 show hypertriglyceridemia            Assessment: He has had improvement in his A1c but still remains above target.  We discussed additional adjustments to his treatment plan including potential increase of his Trulicity however the patient prefers to focus on dietary behavior and physical activity before we make medication adjustments      Diagnoses and all orders for this visit:    1. Uncontrolled type 2 diabetes mellitus with hyperglycemia (HCC) (Primary)  -     POC Glycosylated Hemoglobin (Hb A1C)    2. Type 2 diabetes mellitus with hyperglycemia, without long-term current use of insulin (HCC)    3. Obesity (BMI 30-39.9)        Plan: No changes were made to his treatment plan.  He will focus on dietary strategies and physical activity to control glucose levels.  We will consider increasing the Trulicity at the follow-up appointment if his A1c has not improved further.    The patient will monitor his blood glucose levels 1-2 times a day.  If he develops problematic hyperglycemia or hypoglycemia or adverse drug reactions, he will contact the office for further instructions.        Follow Up     No follow-ups on file.    Patient was given instructions and counseling regarding his condition  or for health maintenance advice. Please see specific information pulled into the AVS if appropriate.     Debra Osman, APRN  09/23/2022      Dictated Utilizing Dragon Dictation.  Please note that portions of this note were completed with a voice recognition program.  Part of this note may be an electronic transcription/translation of spoken language to printed text using the Dragon Dictation System.

## 2022-10-27 DIAGNOSIS — I10 ESSENTIAL (PRIMARY) HYPERTENSION: ICD-10-CM

## 2022-10-27 RX ORDER — LISINOPRIL AND HYDROCHLOROTHIAZIDE 20; 12.5 MG/1; MG/1
TABLET ORAL
Qty: 180 TABLET | Refills: 0 | Status: SHIPPED | OUTPATIENT
Start: 2022-10-27

## 2022-10-29 DIAGNOSIS — G47.00 INSOMNIA, UNSPECIFIED TYPE: ICD-10-CM

## 2022-10-31 RX ORDER — TRAZODONE HYDROCHLORIDE 100 MG/1
TABLET ORAL
Qty: 90 TABLET | Refills: 0 | Status: SHIPPED | OUTPATIENT
Start: 2022-10-31 | End: 2023-01-16

## 2022-11-14 RX ORDER — DULAGLUTIDE 3 MG/.5ML
INJECTION, SOLUTION SUBCUTANEOUS
Qty: 12 ML | Refills: 0 | Status: SHIPPED | OUTPATIENT
Start: 2022-11-14 | End: 2023-01-13

## 2022-11-28 RX ORDER — DAPAGLIFLOZIN 10 MG/1
TABLET, FILM COATED ORAL
Qty: 30 TABLET | Refills: 0 | Status: SHIPPED | OUTPATIENT
Start: 2022-11-28 | End: 2023-01-09

## 2023-01-09 RX ORDER — DAPAGLIFLOZIN 10 MG/1
TABLET, FILM COATED ORAL
Qty: 30 TABLET | Refills: 2 | Status: SHIPPED | OUTPATIENT
Start: 2023-01-09

## 2023-01-10 ENCOUNTER — OFFICE VISIT (OUTPATIENT)
Dept: FAMILY MEDICINE CLINIC | Age: 68
End: 2023-01-10
Payer: MEDICARE

## 2023-01-10 ENCOUNTER — LAB (OUTPATIENT)
Dept: LAB | Facility: HOSPITAL | Age: 68
End: 2023-01-10
Payer: MEDICARE

## 2023-01-10 VITALS
BODY MASS INDEX: 34.44 KG/M2 | OXYGEN SATURATION: 98 % | DIASTOLIC BLOOD PRESSURE: 87 MMHG | SYSTOLIC BLOOD PRESSURE: 132 MMHG | WEIGHT: 246 LBS | HEART RATE: 86 BPM | HEIGHT: 71 IN | TEMPERATURE: 98.3 F

## 2023-01-10 DIAGNOSIS — M79.601 ARM PAIN, RIGHT: ICD-10-CM

## 2023-01-10 DIAGNOSIS — R20.0 NUMBNESS AND TINGLING: Primary | ICD-10-CM

## 2023-01-10 DIAGNOSIS — R20.2 NUMBNESS AND TINGLING: ICD-10-CM

## 2023-01-10 DIAGNOSIS — R20.0 NUMBNESS AND TINGLING: ICD-10-CM

## 2023-01-10 DIAGNOSIS — E11.65 TYPE 2 DIABETES MELLITUS WITH HYPERGLYCEMIA, WITHOUT LONG-TERM CURRENT USE OF INSULIN: ICD-10-CM

## 2023-01-10 DIAGNOSIS — I10 ESSENTIAL (PRIMARY) HYPERTENSION: ICD-10-CM

## 2023-01-10 DIAGNOSIS — R20.2 NUMBNESS AND TINGLING: Primary | ICD-10-CM

## 2023-01-10 LAB
ALBUMIN SERPL-MCNC: 4.6 G/DL (ref 3.5–5.2)
ALBUMIN/GLOB SERPL: 1.6 G/DL
ALP SERPL-CCNC: 78 U/L (ref 39–117)
ALT SERPL W P-5'-P-CCNC: 12 U/L (ref 1–41)
ANION GAP SERPL CALCULATED.3IONS-SCNC: 12.7 MMOL/L (ref 5–15)
AST SERPL-CCNC: 16 U/L (ref 1–40)
BILIRUB SERPL-MCNC: 0.7 MG/DL (ref 0–1.2)
BUN SERPL-MCNC: 13 MG/DL (ref 8–23)
BUN/CREAT SERPL: 11.6 (ref 7–25)
CALCIUM SPEC-SCNC: 10.1 MG/DL (ref 8.6–10.5)
CHLORIDE SERPL-SCNC: 97 MMOL/L (ref 98–107)
CO2 SERPL-SCNC: 29.3 MMOL/L (ref 22–29)
CREAT SERPL-MCNC: 1.12 MG/DL (ref 0.76–1.27)
EGFRCR SERPLBLD CKD-EPI 2021: 72 ML/MIN/1.73
ERYTHROCYTE [SEDIMENTATION RATE] IN BLOOD: 3 MM/HR (ref 0–20)
GLOBULIN UR ELPH-MCNC: 2.9 GM/DL
GLUCOSE SERPL-MCNC: 162 MG/DL (ref 65–99)
HBA1C MFR BLD: 8.6 % (ref 4.8–5.6)
POTASSIUM SERPL-SCNC: 3.5 MMOL/L (ref 3.5–5.2)
PROT SERPL-MCNC: 7.5 G/DL (ref 6–8.5)
SODIUM SERPL-SCNC: 139 MMOL/L (ref 136–145)

## 2023-01-10 PROCEDURE — 85652 RBC SED RATE AUTOMATED: CPT

## 2023-01-10 PROCEDURE — 36415 COLL VENOUS BLD VENIPUNCTURE: CPT

## 2023-01-10 PROCEDURE — 99214 OFFICE O/P EST MOD 30 MIN: CPT | Performed by: NURSE PRACTITIONER

## 2023-01-10 PROCEDURE — 80053 COMPREHEN METABOLIC PANEL: CPT

## 2023-01-10 PROCEDURE — 83036 HEMOGLOBIN GLYCOSYLATED A1C: CPT

## 2023-01-10 NOTE — ASSESSMENT & PLAN NOTE
Went to PT over a year ago and was out by [facility] he thinks, may need a NCS, checking a sed rate today and reviewed EMD, found ortho note from meena 4-2021, he ordered a MRI of shoulder for bilateral shoulder pain R> L, but the MRI results not in EMD

## 2023-01-10 NOTE — PROGRESS NOTES
Maksim Acosta presents to [facility].    Chief Complaint:  Arm Pain ((B) Pt states pain starts at top of shoulder blade and radiates down to fingers. )         History of Present Illness:  Joint pain:   bilateral arm, R> L  Symptoms started: radiates from shoulder to fingers   associated symptoms:burning pain in fingers and intermittently weakness in his arms   Treatment tried: was on diclofenac but is out, meena gave him and it helped   Interested in medical THC, not using now (tylenol did not help)   Previous ortho : [facility]   Dg testing : none recently    Hx DM, sees V Paddy  appt 23, sugars running in the 140's     Past Medical History changes since :       Hyperlipidemia    Hypertension     Sleep Apnea: (+) sleep study; uses CPAP;     Chronic Pain: affecting the low back;     Type 2 Diabetes: dx'd in ;     Glaucoma: dx'd in ;         PREVENTIVE HEALTH MAINTENANCE             COLORECTAL CANCER SCREENING: Up to date (colonoscopy q10y; sigmoidoscopy q5y; Cologuard q3y) was last done 19, Results are in chart; colonoscopy with the following abnormalities noted-- Diverticulosis; The next colonoscopy is due  ; 2014, pt. has consultation scheduled in May       Hepatitis C Medicare Screening: was last done 17; negative         PAST MEDICAL HISTORY             CURRENT MEDICAL PROVIDERS:    Dentist: Dr. Cabrera    Neurologist:     Ophthalmologist: Johnny     Pulmonologist: Dr. Fischer - sleep                   Surgical History:       Right shoulder surgery 2021 R rotator cuff repair  [facility]uber     Procedures:    Colonoscopy         Family History:     Father:  at age 83;  Dementia;  Type 2 Diabetes     Mother:  at age 51; Cause of death was CVA     Brother(s): 3 brother(s) total; 2 ;  Myocardial Infarction (  71 );  cancer of pharynx     Sister(s): 2 sister(s) total; 2 ;   at birth     Daughter(s): 2 daughter(s) total          Social History:     Occupation: Disabled (due to low back pain)     Marital Status:      Children: 2 children             Review of Systems:  Review of Systems   Constitutional: Negative for fatigue and fever.   Respiratory: Negative for cough and shortness of breath.    Cardiovascular: Negative for chest pain, palpitations and leg swelling.   Neurological: Positive for numbness (fingers, bilaterally ).          Current Outpatient Medications:   •  Accu-Chek Softclix Lancets lancets, USE TO CHECK BLOOD SUGAR ONE TO TWO TIMES DAILY, Disp: 100 each, Rfl: 0  •  amLODIPine (NORVASC) 10 MG tablet, Take 1 tablet by mouth Daily., Disp: 90 tablet, Rfl: 1  •  aspirin (EQ Aspirin Adult Low Dose) 81 MG EC tablet, Take 1 tablet by mouth Daily., Disp: 90 tablet, Rfl: 3  •  atorvastatin (LIPITOR) 20 MG tablet, Take 1 tablet by mouth Daily., Disp: 90 tablet, Rfl: 1  •  cloNIDine (CATAPRES) 0.2 MG tablet, Half pill BID, Disp: 45 tablet, Rfl: 1  •  Farxiga 10 MG tablet, Take 1 tablet by mouth once daily, Disp: 30 tablet, Rfl: 2  •  glucose blood (Accu-Chek Marisela Plus) test strip, USE STRIP TO CHECK GLUCOSE ONCE OR TWICE DAILY, Disp: 100 each, Rfl: 0  •  lisinopril-hydrochlorothiazide (PRINZIDE,ZESTORETIC) 20-12.5 MG per tablet, Take 1 tablet by mouth twice daily, Disp: 180 tablet, Rfl: 0  •  metFORMIN (GLUCOPHAGE) 1000 MG tablet, Take 1 tablet by mouth twice daily, Disp: 180 tablet, Rfl: 1  •  potassium chloride 10 MEQ CR tablet, Take 1 tablet by mouth 2 (Two) Times a Day., Disp: 180 tablet, Rfl: 1  •  traZODone (DESYREL) 100 MG tablet, TAKE 1 TABLET BY MOUTH EVERY DAY AT BEDTIME, Disp: 90 tablet, Rfl: 0  •  Trulicity 3 MG/0.5ML solution pen-injector, INJECT 0.5 ML SUBCUTANEOUSLY ONCE EVERY 7 DAYS INTO APPROPRIATE AREA AS DIRECTED, Disp: 12 mL, Rfl: 0    Vital Signs:   Vitals:    01/10/23 1639 01/10/23 1701   BP: 132/94 132/87   BP Location: Right arm Right arm   Patient Position: Sitting Sitting   Cuff Size: Adult     Pulse: 91 86   Temp: 98.3 °F (36.8 °C)    TempSrc: Oral    SpO2: 98%  Comment: room air    Weight: 112 kg (246 lb)    Height: 180.3 cm (70.98\")          Physical Exam:  Physical Exam  Vitals reviewed.   Constitutional:       General: He is not in acute distress.     Appearance: Normal appearance.   Neck:      Vascular: No carotid bruit.   Cardiovascular:      Rate and Rhythm: Normal rate and regular rhythm.      Heart sounds: Normal heart sounds. No murmur heard.  Pulmonary:      Effort: Pulmonary effort is normal. No respiratory distress.      Breath sounds: Normal breath sounds.   Musculoskeletal:      Right lower leg: No edema.      Left lower leg: No edema.      Comments: Full ROM of neck and shoulders without pain, no pain to palpation of cervical or upper thoracic para spinous muscles    Neurological:      Mental Status: He is alert.   Psychiatric:         Mood and Affect: Mood normal.         Behavior: Behavior normal.         Result Review      The following data was reviewed by: ERUM Charles on 01/10/2023:    Results for orders placed or performed in visit on 09/23/22   POC Glycosylated Hemoglobin (Hb A1C)    Specimen: Blood   Result Value Ref Range    Hemoglobin A1C 8.3 %    Lot Number [id number]     Expiration Date 08.31.2024                Assessment and Plan:          Diagnoses and all orders for this visit:    1. Numbness and tingling (Primary)  Assessment & Plan:  Went to PT over a year ago and was out by [facility] he thinks, may need a NCS, checking a sed rate today and reviewed EMD, found ortho note from [facility] 4-2021, he ordered a MRI of shoulder for bilateral shoulder pain R> L, but the MRI results not in EMD     Orders:  -     Sedimentation rate, automated; Future    2. Type 2 diabetes mellitus with hyperglycemia, without long-term current use of insulin (HCC)  Assessment & Plan:  Keep appt with milton mccord later this week, reviewed last A1C, will recheck that today      Orders:  -      Hemoglobin A1c; Future  -     Comprehensive metabolic panel; Future    3. Arm pain, right  Assessment & Plan:  Send for [facility] records / MRI, reviewed the ortho note in EMD from 4-14-21      4. Essential (primary) hypertension  Assessment & Plan:  Rechecking BP           Follow Up   Return for followup pending lab results.  Patient was given instructions and counseling regarding his condition or for health maintenance advice. Please see specific information pulled into the AVS if appropriate.

## 2023-01-13 ENCOUNTER — OFFICE VISIT (OUTPATIENT)
Dept: DIABETES SERVICES | Facility: CLINIC | Age: 68
End: 2023-01-13
Payer: MEDICARE

## 2023-01-13 VITALS
BODY MASS INDEX: 34.3 KG/M2 | DIASTOLIC BLOOD PRESSURE: 88 MMHG | HEIGHT: 71 IN | SYSTOLIC BLOOD PRESSURE: 125 MMHG | OXYGEN SATURATION: 99 % | WEIGHT: 245 LBS | TEMPERATURE: 97.1 F | HEART RATE: 81 BPM

## 2023-01-13 DIAGNOSIS — E11.65 UNCONTROLLED TYPE 2 DIABETES MELLITUS WITH HYPERGLYCEMIA: Primary | ICD-10-CM

## 2023-01-13 DIAGNOSIS — E66.9 OBESITY (BMI 30-39.9): ICD-10-CM

## 2023-01-13 DIAGNOSIS — E11.65 TYPE 2 DIABETES MELLITUS WITH HYPERGLYCEMIA, WITHOUT LONG-TERM CURRENT USE OF INSULIN: ICD-10-CM

## 2023-01-13 PROBLEM — E11.69 TYPE 2 DIABETES MELLITUS WITH HYPERLIPIDEMIA: Status: ACTIVE | Noted: 2021-02-02

## 2023-01-13 PROBLEM — E87.6 CHRONIC HYPOKALEMIA: Status: ACTIVE | Noted: 2021-02-02

## 2023-01-13 PROBLEM — H42 GLAUCOMA DUE TO TYPE 2 DIABETES MELLITUS: Status: ACTIVE | Noted: 2021-02-02

## 2023-01-13 PROBLEM — K02.9 DENTAL CARIES: Status: ACTIVE | Noted: 2021-02-02

## 2023-01-13 PROBLEM — I15.2 HYPERTENSION ASSOCIATED WITH DIABETES: Status: ACTIVE | Noted: 2021-02-02

## 2023-01-13 PROBLEM — E11.59 HYPERTENSION ASSOCIATED WITH DIABETES: Status: ACTIVE | Noted: 2021-02-02

## 2023-01-13 PROBLEM — E66.01 MORBID (SEVERE) OBESITY DUE TO EXCESS CALORIES: Status: ACTIVE | Noted: 2021-02-02

## 2023-01-13 PROBLEM — G47.33 OSA ON CPAP: Status: ACTIVE | Noted: 2021-02-02

## 2023-01-13 PROBLEM — F51.01 PRIMARY INSOMNIA: Status: ACTIVE | Noted: 2021-02-02

## 2023-01-13 PROBLEM — R69 SUSPECTED CONDITION: Status: ACTIVE | Noted: 2022-01-28

## 2023-01-13 PROBLEM — E11.39 GLAUCOMA DUE TO TYPE 2 DIABETES MELLITUS: Status: ACTIVE | Noted: 2021-02-02

## 2023-01-13 PROBLEM — G31.84 MILD COGNITIVE IMPAIRMENT: Status: ACTIVE | Noted: 2018-05-31

## 2023-01-13 PROBLEM — E78.5 TYPE 2 DIABETES MELLITUS WITH HYPERLIPIDEMIA: Status: ACTIVE | Noted: 2021-02-02

## 2023-01-13 PROBLEM — Z00.01 ENCOUNTER FOR GENERAL ADULT MEDICAL EXAMINATION WITH ABNORMAL FINDINGS: Status: ACTIVE | Noted: 2021-02-02

## 2023-01-13 PROBLEM — M54.50 CHRONIC MIDLINE LOW BACK PAIN WITHOUT SCIATICA: Status: ACTIVE | Noted: 2021-02-02

## 2023-01-13 PROBLEM — G89.29 CHRONIC MIDLINE LOW BACK PAIN WITHOUT SCIATICA: Status: ACTIVE | Noted: 2021-02-02

## 2023-01-13 PROBLEM — Z99.89 OSA ON CPAP: Status: ACTIVE | Noted: 2021-02-02

## 2023-01-13 PROCEDURE — 3052F HG A1C>EQUAL 8.0%<EQUAL 9.0%: CPT | Performed by: NURSE PRACTITIONER

## 2023-01-13 PROCEDURE — 99214 OFFICE O/P EST MOD 30 MIN: CPT | Performed by: NURSE PRACTITIONER

## 2023-01-13 RX ORDER — DULAGLUTIDE 4.5 MG/.5ML
4.5 INJECTION, SOLUTION SUBCUTANEOUS
Qty: 6 ML | Refills: 1 | Status: SHIPPED | OUTPATIENT
Start: 2023-01-13 | End: 2023-02-04

## 2023-01-13 NOTE — PROGRESS NOTES
Chief Complaint  Diabetes (Follow up, med mgt, labs completed 1/10/23)    Referred By: No ref. provider found    Subjective          Maksim Acosta presents to [facility] for diabetes medication management    History of Present Illness    Visit type:  follow-up  Diabetes type:  Type 2  Current diabetes status/concerns/issues:  He denies any concerns specific to his diabetes today.  He states his blood sugars have been all bit higher.  He admits to over indulging during the holidays  Other health concerns: he is having nerve pain down his arms  Current Diabetes symptoms:    Polyuria: Yes   Polydipsia: Yes   Polyphagia: No   Blurred vision: No   Excessive fatigue: No   Known Diabetes complications:  Neuro: None  Renal: Stage II renal disease  Eyes: None  Amputation/Wounds: None  GI: None  Cardiovascular: Hypertension and hyperlipidemia  ED: Per patient   Other: None  Hypoglycemia:  None reported at this time  Hypoglycemia Symptoms:  No hypoglycemia at this time  Current diabetes treatment:  metformin 1000 mg twice a day and Trulicity 3 mg once weekly, Farxiga 10 mg once a day  Blood glucose device:  Meter  Blood glucose monitoring frequency:  Random testing  Blood glucose range/average:  140-170  Diet:  He has been eating more candy and sweets over the holidays  Activity/Exercise:  None    Past Medical History:   Diagnosis Date   • Acanthosis nigricans    • Chronic pain     LOW BACK    • Essential (primary) hypertension    • Glaucoma     DX'D IN 2009   • Hyperlipidemia, unspecified    • Hypokalemia    • Insomnia, unspecified    • Low back pain    • Nicotine dependence, cigarettes, in remission    • Obstructive sleep apnea (adult) (pediatric)     (+) SLEEP STUDY; USES CPAP   • Other symptoms and signs involving cognitive functions and awareness    • Pain in left leg    • Pain in left thigh    • Personal history of colonic polyps    • Psoriasis, unspecified    • Type 2 diabetes mellitus  with hyperglycemia (HCC)     DX'D IN    • Vitamin D deficiency      Past Surgical History:   Procedure Laterality Date   • COLONOSCOPY     • ROTATOR CUFF REPAIR Right    • SHOULDER ARTHROSCOPY Right 2021     Family History   Problem Relation Age of Onset   • Stroke Mother    • Dementia Father    • Diabetes type II Father    • Heart attack Brother 71   • Cancer Brother         PHARYNX     Social History     Socioeconomic History   • Marital status:    • Number of children: 2   Tobacco Use   • Smoking status: Former     Packs/day: 0.50     Years: 15.00     Pack years: 7.50     Types: Cigarettes     Quit date:      Years since quittin.0   • Smokeless tobacco: Never   Vaping Use   • Vaping Use: Never used   Substance and Sexual Activity   • Alcohol use: Never     Comment: NON-DRINKER   • Drug use: Not Currently     Types: Marijuana     Comment: stopped     • Sexual activity: Defer     No Known Allergies    Current Outpatient Medications:   •  Accu-Chek Softclix Lancets lancets, USE TO CHECK BLOOD SUGAR ONE TO TWO TIMES DAILY, Disp: 100 each, Rfl: 0  •  amLODIPine (NORVASC) 10 MG tablet, Take 1 tablet by mouth Daily., Disp: 90 tablet, Rfl: 1  •  aspirin (EQ Aspirin Adult Low Dose) 81 MG EC tablet, Take 1 tablet by mouth Daily., Disp: 90 tablet, Rfl: 3  •  atorvastatin (LIPITOR) 20 MG tablet, Take 1 tablet by mouth Daily., Disp: 90 tablet, Rfl: 1  •  cloNIDine (CATAPRES) 0.2 MG tablet, Half pill BID, Disp: 45 tablet, Rfl: 1  •  Farxiga 10 MG tablet, Take 1 tablet by mouth once daily, Disp: 30 tablet, Rfl: 2  •  glucose blood (Accu-Chek Marisela Plus) test strip, USE STRIP TO CHECK GLUCOSE ONCE OR TWICE DAILY, Disp: 100 each, Rfl: 0  •  lisinopril-hydrochlorothiazide (PRINZIDE,ZESTORETIC) 20-12.5 MG per tablet, Take 1 tablet by mouth twice daily, Disp: 180 tablet, Rfl: 0  •  metFORMIN (GLUCOPHAGE) 1000 MG tablet, Take 1 tablet by mouth twice daily, Disp: 180 tablet, Rfl: 1  •  potassium  "chloride 10 MEQ CR tablet, Take 1 tablet by mouth 2 (Two) Times a Day., Disp: 180 tablet, Rfl: 1  •  traZODone (DESYREL) 100 MG tablet, TAKE 1 TABLET BY MOUTH EVERY DAY AT BEDTIME, Disp: 90 tablet, Rfl: 0  •  Dulaglutide (Trulicity) 4.5 MG/0.5ML solution pen-injector, Inject 0.5 mL under the skin into the appropriate area as directed Every 7 (Seven) Days for 4 doses., Disp: 6 mL, Rfl: 1    Review of Systems   Constitutional: Positive for fatigue. Negative for activity change, appetite change, unexpected weight gain and unexpected weight loss.   Eyes: Negative for blurred vision and visual disturbance.   Gastrointestinal: Positive for abdominal pain. Negative for constipation, diarrhea, nausea, vomiting, GERD and indigestion.   Endocrine: Positive for polydipsia and polyuria. Negative for polyphagia.   Neurological: Negative for numbness.        Objective     Vitals:    01/13/23 0808   BP: 125/88   BP Location: Left arm   Patient Position: Sitting   Cuff Size: Adult   Pulse: 81   Temp: 97.1 °F (36.2 °C)   SpO2: 99%   Weight: 111 kg (245 lb)   Height: 180.3 cm (71\")   PainSc:   9     Body mass index is 34.17 kg/m².    Physical Exam  Constitutional:       Appearance: Normal appearance. He is obese.      Comments: Obesity with BMI of 34.17   HENT:      Head: Normocephalic and atraumatic.      Right Ear: External ear normal.      Left Ear: External ear normal.      Nose: Nose normal.   Eyes:      Extraocular Movements: Extraocular movements intact.      Conjunctiva/sclera: Conjunctivae normal.   Pulmonary:      Effort: Pulmonary effort is normal.   Musculoskeletal:         General: Normal range of motion.      Cervical back: Normal range of motion.   Skin:     General: Skin is warm and dry.   Neurological:      General: No focal deficit present.      Mental Status: He is alert and oriented to person, place, and time. Mental status is at baseline.   Psychiatric:         Mood and Affect: Mood normal.         Behavior: " Behavior normal.         Thought Content: Thought content normal.         Judgment: Judgment normal.         Result Review :   The following data was reviewed by: ERUM Davila on 01/13/2023:    Most Recent A1C    HGBA1C Most Recent 1/10/23   Hemoglobin A1C 8.60 (A)   (A) Abnormal value              A1C Last 3 Results    HGBA1C Last 3 Results 4/22/22 9/23/22 1/10/23   Hemoglobin A1C 9.4 8.3 8.60 (A)   (A) Abnormal value            His most recent A1c was collected on 1/10/2023 and was 8.6% indicating uncontrolled type 2 diabetes.  This is up from the prior result of 8.3 collected in September 2022    Creatinine   Date Value Ref Range Status   01/10/2023 1.12 0.76 - 1.27 mg/dL Final   05/11/2022 1.19 0.76 - 1.27 mg/dL Final     eGFR   Date Value Ref Range Status   01/10/2023 72.0 >60.0 mL/min/1.73 Final     Comment:     National Kidney Foundation and American Society of Nephrology (ASN) Task Force recommended calculation based on the Chronic Kidney Disease Epidemiology Collaboration (CKD-EPI) equation refit without adjustment for race.   05/11/2022 67.4 >60.0 mL/min/1.73 Final     Comment:     National Kidney Foundation and American Society of Nephrology (ASN) Task Force recommended calculation based on the Chronic Kidney Disease Epidemiology Collaboration (CKD-EPI) equation refit without adjustment for race.     Labs collected on 1/10/2023 shows stage II renal disease            Assessment: The patient has had an increase in his A1c.  He admits to over indulging on sweets during the holidays.  He has had a 3 pound weight loss since last appointment.      Diagnoses and all orders for this visit:    1. Uncontrolled type 2 diabetes mellitus with hyperglycemia (HCC) (Primary)  -     Dulaglutide (Trulicity) 4.5 MG/0.5ML solution pen-injector; Inject 0.5 mL under the skin into the appropriate area as directed Every 7 (Seven) Days for 4 doses.  Dispense: 6 mL; Refill: 1    2. Type 2 diabetes mellitus with  hyperglycemia, without long-term current use of insulin (HCC)    3. Obesity (BMI 30-39.9)        Plan: We will increase his Trulicity to 4.5 mg once weekly.  He will finish out the 3 mg supply he has on hand and then will make the increase.  If he has any difficulties tolerating the increased dose he will contact our office    The patient will monitor his blood glucose levels once a day.  If he develops problematic hyperglycemia or hypoglycemia or adverse drug reactions, he will contact the office for further instructions.        Follow Up     No follow-ups on file.    Patient was given instructions and counseling regarding his condition or for health maintenance advice. Please see specific information pulled into the AVS if appropriate.     Debra Osman, APRN  01/13/2023      Dictated Utilizing Dragon Dictation.  Please note that portions of this note were completed with a voice recognition program.  Part of this note may be an electronic transcription/translation of spoken language to printed text using the Dragon Dictation System.

## 2023-01-14 DIAGNOSIS — G47.00 INSOMNIA, UNSPECIFIED TYPE: ICD-10-CM

## 2023-01-16 RX ORDER — TRAZODONE HYDROCHLORIDE 100 MG/1
TABLET ORAL
Qty: 90 TABLET | Refills: 0 | Status: SHIPPED | OUTPATIENT
Start: 2023-01-16

## 2023-01-17 DIAGNOSIS — R20.0 ARM NUMBNESS: Primary | ICD-10-CM

## 2023-02-09 DIAGNOSIS — I10 ESSENTIAL (PRIMARY) HYPERTENSION: ICD-10-CM

## 2023-02-10 ENCOUNTER — TELEPHONE (OUTPATIENT)
Dept: DIABETES SERVICES | Facility: HOSPITAL | Age: 68
End: 2023-02-10
Payer: MEDICARE

## 2023-02-10 DIAGNOSIS — E78.5 HYPERLIPIDEMIA, UNSPECIFIED HYPERLIPIDEMIA TYPE: ICD-10-CM

## 2023-02-10 RX ORDER — DULAGLUTIDE 3 MG/.5ML
3 INJECTION, SOLUTION SUBCUTANEOUS
Qty: 2 ML | Refills: 5 | Status: SHIPPED | OUTPATIENT
Start: 2023-02-10 | End: 2023-03-04

## 2023-02-10 RX ORDER — POTASSIUM CHLORIDE 750 MG/1
TABLET, FILM COATED, EXTENDED RELEASE ORAL
Qty: 180 TABLET | Refills: 0 | Status: SHIPPED | OUTPATIENT
Start: 2023-02-10

## 2023-02-10 NOTE — TELEPHONE ENCOUNTER
Pt was called back in regards to message above, Pts mailbox was full and unable to leave a voicemail. If Pt calls back please transfer to me or please ask patient what pharmacy he would like to use and I would be glad to add it to his chart and send it there.

## 2023-02-10 NOTE — TELEPHONE ENCOUNTER
Patient reports issues finding Trulicity at multiple pharmacies in [place], and is looking for advisement.

## 2023-02-10 NOTE — TELEPHONE ENCOUNTER
BENJAMIN BOLAND Key: [id number] - PA Case ID: [id number] - Rx #: [id number]  Trulicity 4.5MG/0.5ML pen-injectors    Approvedtoday  The request has been approved. The authorization is effective for a maximum of 12 fills from 02/10/2023 to 02/09/2024, as long as the member is enrolled in their current health plan. The request was approved as submitted. A written notification letter will follow with additional details.

## 2023-02-10 NOTE — TELEPHONE ENCOUNTER
Please send RX for Trulicity to [facility] pharmacy, I went to refill and it states it has ended, Pt states he was supposed to take his injection on Wednesday, Please advise

## 2023-02-13 RX ORDER — ATORVASTATIN CALCIUM 20 MG/1
TABLET, FILM COATED ORAL
Qty: 90 TABLET | Refills: 0 | Status: SHIPPED | OUTPATIENT
Start: 2023-02-13

## 2023-02-13 NOTE — TELEPHONE ENCOUNTER
Rx Refill Note  Requested Prescriptions     Pending Prescriptions Disp Refills   • atorvastatin (LIPITOR) 20 MG tablet [Pharmacy Med Name: Atorvastatin Calcium 20 MG Oral Tablet] 90 tablet 0     Sig: Take 1 tablet by mouth once daily      Last office visit with prescribing clinician: 1/10/2023   Last telemedicine visit with prescribing clinician: Visit date not found   Next office visit with prescribing clinician: Visit date not found  Lipid Panel (05/11/2022 10:02)      Fabiola Sousa LPN  02/13/23, 09:56 EST

## 2023-03-08 ENCOUNTER — OFFICE VISIT (OUTPATIENT)
Dept: NEUROLOGY | Facility: CLINIC | Age: 68
End: 2023-03-08
Payer: MEDICARE

## 2023-03-08 VITALS
OXYGEN SATURATION: 97 % | BODY MASS INDEX: 34.3 KG/M2 | HEIGHT: 71 IN | DIASTOLIC BLOOD PRESSURE: 82 MMHG | SYSTOLIC BLOOD PRESSURE: 117 MMHG | WEIGHT: 245 LBS | HEART RATE: 79 BPM

## 2023-03-08 DIAGNOSIS — M62.81 MUSCLE WEAKNESS: Primary | ICD-10-CM

## 2023-03-08 PROCEDURE — 99205 OFFICE O/P NEW HI 60 MIN: CPT | Performed by: PSYCHIATRY & NEUROLOGY

## 2023-03-08 PROCEDURE — 3079F DIAST BP 80-89 MM HG: CPT | Performed by: PSYCHIATRY & NEUROLOGY

## 2023-03-08 PROCEDURE — 1160F RVW MEDS BY RX/DR IN RCRD: CPT | Performed by: PSYCHIATRY & NEUROLOGY

## 2023-03-08 PROCEDURE — 1159F MED LIST DOCD IN RCRD: CPT | Performed by: PSYCHIATRY & NEUROLOGY

## 2023-03-08 PROCEDURE — 3074F SYST BP LT 130 MM HG: CPT | Performed by: PSYCHIATRY & NEUROLOGY

## 2023-03-08 RX ORDER — DULAGLUTIDE 4.5 MG/.5ML
INJECTION, SOLUTION SUBCUTANEOUS
COMMUNITY
Start: 2023-03-06

## 2023-03-08 NOTE — PROGRESS NOTES
This is a very pleasant 67-year-old gentleman who describes abnormal sensations in both of his arms.  He states that he had rotator cuff surgery done in his right rotator cuff several years ago after this event he started develop what he describes as muscle spasms in his biceps triceps and then in his hands he also described paresthesias.  The sensations then started in his left arm where he states that his muscles will jump and quiver as well as occasional burning sensation throughout his arms.  He states that the burning sensation does not last for long period of time.  He denies any major weakness he denies any muscle atrophy.        Past medical history    Diabetes    Hypertension    Hyperlipidemia    Previous rotator cuff repair     obstructive sleep apnea on a CPAP.        Family history    There is no family history of neurologic disease        Social history    No alcohol tobacco or illicit drug use        On examination today he is alert and oriented x3.  His speech is fluent there is no dysarthria no aphasia.  The content of his speech is appropriate and goal-directed.  Cranial nerves II through XII are intact I do not see any tongue fasciculations.  He has 5 out of 5 strength in bilateral upper and lower extremities I do not see any atrophy of the muscle groups I do not see any myokymia.  His reflexes are diminished in the lower extremities 2+ in the upper extremities but toes are downgoing there is no Rodgers's no clonus.  No ataxia finger-nose or heel-to-shin.  No loss of sensation.        In summary this a very pleasant 67-year-old gentleman whose complaining of intermittent quivering movement in his muscles as well as intermittent weakness and paresthesias.  Possibly this could be a start of a diabetic peripheral neuropathy although typically this would start distally.  I am going to obtain some routine blood work today including checking muscle enzymes we will plan on obtaining an EMG nerve  conduction study of both of his upper extremities and an MRI of the cervical cord.  He will follow-up with me at the conclusion of testing or sooner if there is any problems in the interim.      Diagnoses and all orders for this visit:    1. Muscle weakness (Primary)  -     Comprehensive Metabolic Panel; Future  -     CBC & Differential; Future  -     Vitamin B12; Future  -     Methylmalonic Acid, Serum; Future  -     Folate; Future  -     EMG & Nerve Conduction Test; Future  -     MRI Cervical Spine Without Contrast; Future  -     CK; Future  -     Aldolase; Future      I spent 1 hour in patient care.

## 2023-03-17 DIAGNOSIS — E11.65 TYPE 2 DIABETES MELLITUS WITH HYPERGLYCEMIA, WITHOUT LONG-TERM CURRENT USE OF INSULIN: ICD-10-CM

## 2023-03-23 ENCOUNTER — TELEPHONE (OUTPATIENT)
Dept: NEUROLOGY | Facility: CLINIC | Age: 68
End: 2023-03-23
Payer: MEDICARE

## 2023-03-23 NOTE — TELEPHONE ENCOUNTER
Attempted to call pt in regards to overdue lab results. No answer, was unable to leave a voicemail due to mailbox being full.     Contacted pts alternative contact, his daughter April and asked her if she could relay my message to pt to please have labs drawn when he goes in for his MRI. I have also made a note on his appt notes to remind pt to have labs drawn as well.     Daughter will call pt and inform him of pending lab orders- clr

## 2023-04-01 ENCOUNTER — HOSPITAL ENCOUNTER (OUTPATIENT)
Dept: MRI IMAGING | Facility: HOSPITAL | Age: 68
Discharge: HOME | End: 2023-04-01
Admitting: PSYCHIATRY & NEUROLOGY
Payer: MEDICARE

## 2023-04-01 DIAGNOSIS — M62.81 MUSCLE WEAKNESS: ICD-10-CM

## 2023-04-01 PROCEDURE — 72141 MRI NECK SPINE W/O DYE: CPT

## 2023-04-03 ENCOUNTER — TELEPHONE (OUTPATIENT)
Dept: FAMILY MEDICINE CLINIC | Age: 68
End: 2023-04-03
Payer: MEDICARE

## 2023-04-03 NOTE — TELEPHONE ENCOUNTER
No answer, no vm.    Lab does not take appts.  Open Mon- Fri, 7am - 6pm.     OK FOR HUB TO SHARE.

## 2023-04-06 ENCOUNTER — LAB (OUTPATIENT)
Dept: LAB | Facility: HOSPITAL | Age: 68
End: 2023-04-06
Payer: MEDICARE

## 2023-04-06 DIAGNOSIS — M62.81 MUSCLE WEAKNESS: ICD-10-CM

## 2023-04-06 LAB
ALBUMIN SERPL-MCNC: 4.3 G/DL (ref 3.5–5.2)
ALBUMIN/GLOB SERPL: 1.5 G/DL
ALP SERPL-CCNC: 81 U/L (ref 39–117)
ALT SERPL W P-5'-P-CCNC: 11 U/L (ref 1–41)
ANION GAP SERPL CALCULATED.3IONS-SCNC: 12.4 MMOL/L (ref 5–15)
AST SERPL-CCNC: 15 U/L (ref 1–40)
BASOPHILS # BLD AUTO: 0.05 10*3/MM3 (ref 0–0.2)
BASOPHILS NFR BLD AUTO: 0.9 % (ref 0–1.5)
BILIRUB SERPL-MCNC: 0.2 MG/DL (ref 0–1.2)
BUN SERPL-MCNC: 11 MG/DL (ref 8–23)
BUN/CREAT SERPL: 11.5 (ref 7–25)
CALCIUM SPEC-SCNC: 9.3 MG/DL (ref 8.6–10.5)
CHLORIDE SERPL-SCNC: 105 MMOL/L (ref 98–107)
CK SERPL-CCNC: 120 U/L (ref 20–200)
CO2 SERPL-SCNC: 24.6 MMOL/L (ref 22–29)
CREAT SERPL-MCNC: 0.96 MG/DL (ref 0.76–1.27)
DEPRECATED RDW RBC AUTO: 41 FL (ref 37–54)
EGFRCR SERPLBLD CKD-EPI 2021: 86.6 ML/MIN/1.73
EOSINOPHIL # BLD AUTO: 0.34 10*3/MM3 (ref 0–0.4)
EOSINOPHIL NFR BLD AUTO: 5.8 % (ref 0.3–6.2)
ERYTHROCYTE [DISTWIDTH] IN BLOOD BY AUTOMATED COUNT: 13.1 % (ref 12.3–15.4)
FOLATE SERPL-MCNC: 6.27 NG/ML (ref 4.78–24.2)
GLOBULIN UR ELPH-MCNC: 2.8 GM/DL
GLUCOSE SERPL-MCNC: 161 MG/DL (ref 65–99)
HCT VFR BLD AUTO: 40.6 % (ref 37.5–51)
HGB BLD-MCNC: 13.7 G/DL (ref 13–17.7)
IMM GRANULOCYTES # BLD AUTO: 0 10*3/MM3 (ref 0–0.05)
IMM GRANULOCYTES NFR BLD AUTO: 0 % (ref 0–0.5)
LYMPHOCYTES # BLD AUTO: 1.95 10*3/MM3 (ref 0.7–3.1)
LYMPHOCYTES NFR BLD AUTO: 33.4 % (ref 19.6–45.3)
MCH RBC QN AUTO: 28.5 PG (ref 26.6–33)
MCHC RBC AUTO-ENTMCNC: 33.7 G/DL (ref 31.5–35.7)
MCV RBC AUTO: 84.6 FL (ref 79–97)
MONOCYTES # BLD AUTO: 0.36 10*3/MM3 (ref 0.1–0.9)
MONOCYTES NFR BLD AUTO: 6.2 % (ref 5–12)
NEUTROPHILS NFR BLD AUTO: 3.13 10*3/MM3 (ref 1.7–7)
NEUTROPHILS NFR BLD AUTO: 53.7 % (ref 42.7–76)
PLATELET # BLD AUTO: 245 10*3/MM3 (ref 140–450)
PMV BLD AUTO: 9.9 FL (ref 6–12)
POTASSIUM SERPL-SCNC: 3.2 MMOL/L (ref 3.5–5.2)
PROT SERPL-MCNC: 7.1 G/DL (ref 6–8.5)
RBC # BLD AUTO: 4.8 10*6/MM3 (ref 4.14–5.8)
SODIUM SERPL-SCNC: 142 MMOL/L (ref 136–145)
VIT B12 BLD-MCNC: 577 PG/ML (ref 211–946)
WBC NRBC COR # BLD: 5.83 10*3/MM3 (ref 3.4–10.8)

## 2023-04-06 PROCEDURE — 83921 ORGANIC ACID SINGLE QUANT: CPT

## 2023-04-06 PROCEDURE — 82085 ASSAY OF ALDOLASE: CPT

## 2023-04-06 PROCEDURE — 82607 VITAMIN B-12: CPT

## 2023-04-06 PROCEDURE — 85025 COMPLETE CBC W/AUTO DIFF WBC: CPT

## 2023-04-06 PROCEDURE — 80053 COMPREHEN METABOLIC PANEL: CPT

## 2023-04-06 PROCEDURE — 82550 ASSAY OF CK (CPK): CPT

## 2023-04-06 PROCEDURE — 36415 COLL VENOUS BLD VENIPUNCTURE: CPT

## 2023-04-06 PROCEDURE — 82746 ASSAY OF FOLIC ACID SERUM: CPT

## 2023-04-07 LAB — ALDOLASE SERPL-CCNC: 2.7 U/L (ref 3.3–10.3)

## 2023-04-12 LAB — METHYLMALONATE SERPL-SCNC: 460 NMOL/L (ref 0–378)

## 2023-04-18 ENCOUNTER — OFFICE VISIT (OUTPATIENT)
Dept: FAMILY MEDICINE CLINIC | Age: 68
End: 2023-04-18
Payer: MEDICARE

## 2023-04-18 ENCOUNTER — LAB (OUTPATIENT)
Dept: LAB | Facility: HOSPITAL | Age: 68
End: 2023-04-18
Payer: MEDICARE

## 2023-04-18 ENCOUNTER — OFFICE VISIT (OUTPATIENT)
Dept: NEUROLOGY | Facility: CLINIC | Age: 68
End: 2023-04-18
Payer: MEDICARE

## 2023-04-18 VITALS
OXYGEN SATURATION: 100 % | WEIGHT: 247 LBS | DIASTOLIC BLOOD PRESSURE: 86 MMHG | SYSTOLIC BLOOD PRESSURE: 136 MMHG | RESPIRATION RATE: 16 BRPM | HEART RATE: 72 BPM | BODY MASS INDEX: 34.58 KG/M2 | HEIGHT: 71 IN

## 2023-04-18 VITALS
WEIGHT: 247.4 LBS | RESPIRATION RATE: 16 BRPM | BODY MASS INDEX: 34.64 KG/M2 | SYSTOLIC BLOOD PRESSURE: 140 MMHG | DIASTOLIC BLOOD PRESSURE: 96 MMHG | HEIGHT: 71 IN | HEART RATE: 76 BPM

## 2023-04-18 DIAGNOSIS — I10 ESSENTIAL HYPERTENSION: ICD-10-CM

## 2023-04-18 DIAGNOSIS — E11.42 DIABETIC POLYNEUROPATHY ASSOCIATED WITH TYPE 2 DIABETES MELLITUS: Primary | ICD-10-CM

## 2023-04-18 DIAGNOSIS — M48.02 SPINAL STENOSIS IN CERVICAL REGION: ICD-10-CM

## 2023-04-18 DIAGNOSIS — R20.0 NUMBNESS AND TINGLING: Primary | ICD-10-CM

## 2023-04-18 DIAGNOSIS — R20.2 NUMBNESS AND TINGLING: Primary | ICD-10-CM

## 2023-04-18 DIAGNOSIS — E87.6 CHRONIC HYPOKALEMIA: ICD-10-CM

## 2023-04-18 PROCEDURE — 3080F DIAST BP >= 90 MM HG: CPT | Performed by: NURSE PRACTITIONER

## 2023-04-18 PROCEDURE — 3052F HG A1C>EQUAL 8.0%<EQUAL 9.0%: CPT | Performed by: NURSE PRACTITIONER

## 2023-04-18 PROCEDURE — 1159F MED LIST DOCD IN RCRD: CPT | Performed by: PSYCHIATRY & NEUROLOGY

## 2023-04-18 PROCEDURE — 99213 OFFICE O/P EST LOW 20 MIN: CPT | Performed by: NURSE PRACTITIONER

## 2023-04-18 PROCEDURE — 1160F RVW MEDS BY RX/DR IN RCRD: CPT | Performed by: PSYCHIATRY & NEUROLOGY

## 2023-04-18 PROCEDURE — 3075F SYST BP GE 130 - 139MM HG: CPT | Performed by: PSYCHIATRY & NEUROLOGY

## 2023-04-18 PROCEDURE — 3079F DIAST BP 80-89 MM HG: CPT | Performed by: PSYCHIATRY & NEUROLOGY

## 2023-04-18 PROCEDURE — 36415 COLL VENOUS BLD VENIPUNCTURE: CPT

## 2023-04-18 PROCEDURE — 80048 BASIC METABOLIC PNL TOTAL CA: CPT

## 2023-04-18 PROCEDURE — 99214 OFFICE O/P EST MOD 30 MIN: CPT | Performed by: PSYCHIATRY & NEUROLOGY

## 2023-04-18 PROCEDURE — 3077F SYST BP >= 140 MM HG: CPT | Performed by: NURSE PRACTITIONER

## 2023-04-18 RX ORDER — BRIMONIDINE TARTRATE/TIMOLOL 0.2%-0.5%
DROPS OPHTHALMIC (EYE)
COMMUNITY
Start: 2023-03-15

## 2023-04-18 RX ORDER — GABAPENTIN 100 MG/1
100 CAPSULE ORAL 3 TIMES DAILY
Qty: 90 CAPSULE | Refills: 2 | Status: SHIPPED | OUTPATIENT
Start: 2023-04-18 | End: 2024-04-17

## 2023-04-18 NOTE — PROGRESS NOTES
Maksim Acosta presents to [facility].    Chief Complaint:  Results (Discuss MRI results) and Follow-up/ he just saw Marely for his neck / MRI          History of Present Illness:  Neck pain / saw Dr Yap this am  There is no evidence of a focal herniation or of cord signal  abnormality. Multilevel degenerative disease involving the cervical  spine is noted as described in detail above most prominent of which is  at C5-6 where a disc osteophyte complex is present resulting in mild  flattening of the ventral surface of the thecal sac and to a lesser  extent cervical cord by contributing to moderate foraminal stenosis on  the left. A central disc osteophyte complex at C3-4 is present which  abuts and flattens the cord centrally as well as contributing to  mild-to-moderate canal stenosis. See above.    Hypertension:  Current medication: on K, 10 and taking BID , ACE/HCTZ 25, norvasc, clonicdine   Tolerating Medication: Yes   Checking BP at home and it is: checks and usually 120/78  Needs refills: No, but [facility] is his pharmacy   Labs:  Lab Results       Component                Value               Date                       GLUCOSE                  161 (H)             04/06/2023                 BUN                      11                  04/06/2023                 CREATININE               0.96                04/06/2023                 EGFRIFAFRI               81                  09/27/2021                 BCR                      11.5                04/06/2023                 K                        3.2 (L)             04/06/2023                 CO2                      24.6                04/06/2023                 CALCIUM                  9.3                 04/06/2023                 ALBUMIN                  4.3                 04/06/2023                 LABIL2                   1.4                 03/24/2021                 AST                      15                   2023                 ALT                      11                  2023              Past Medical History changes since :        Hyperlipidemia    Hypertension     Sleep Apnea: (+) sleep study; uses CPAP;     Chronic Pain: affecting the low back;     Type 2 Diabetes: dx'd in ;     Glaucoma: dx'd in ;         PREVENTIVE HEALTH MAINTENANCE             COLORECTAL CANCER SCREENING: Up to date (colonoscopy q10y; sigmoidoscopy q5y; Cologuard q3y) was last done 19, Results are in chart; colonoscopy with the following abnormalities noted-- Diverticulosis; The next colonoscopy is due  ; 2014, pt. has consultation scheduled in May       Hepatitis C Medicare Screening: was last done 17; negative             CURRENT MEDICAL PROVIDERS:    Dentist: Dr. Cabrera    Neurologist: Dr Yap     Ophthalmologist: Johnny     Pulmonologist: Dr. Fischer - sleep                   Surgical History:       Right shoulder surgery 2021 R rotator cuff repair  Duber     Procedures:    Colonoscopy         Family History:     Father:  at age 83;  Dementia;  Type 2 Diabetes     Mother:  at age 51; Cause of death was CVA     Brother(s): 3 brother(s) total; 2 ;  Myocardial Infarction (  71 );  cancer of pharynx     Sister(s): 2 sister(s) total; 2 ;   at birth     Daughter(s): 2 daughter(s) total         Social History:     Occupation: Disabled (due to low back pain)     Marital Status:      Children: 2 children          Review of Systems:  Review of Systems   Constitutional: Negative for fatigue and fever.   Respiratory: Negative for cough and shortness of breath.    Cardiovascular: Positive for leg swelling (occ in his left foot or ankle ). Negative for chest pain and palpitations.          Current Outpatient Medications:   •  Accu-Chek Softclix Lancets lancets, USE TO CHECK BLOOD SUGAR ONE TO TWO TIMES DAILY, Disp: 100 each, Rfl: 0  •  amLODIPine (NORVASC)  "10 MG tablet, Take 1 tablet by mouth Daily., Disp: 90 tablet, Rfl: 1  •  atorvastatin (LIPITOR) 20 MG tablet, Take 1 tablet by mouth once daily, Disp: 90 tablet, Rfl: 0  •  cloNIDine (CATAPRES) 0.2 MG tablet, Half pill BID, Disp: 45 tablet, Rfl: 1  •  Combigan 0.2-0.5 % ophthalmic solution, instill 1 drop into each eye twice daily, Disp: , Rfl:   •  Farxiga 10 MG tablet, Take 1 tablet by mouth once daily, Disp: 30 tablet, Rfl: 2  •  gabapentin (Neurontin) 100 MG capsule, Take 1 capsule by mouth 3 (Three) Times a Day., Disp: 90 capsule, Rfl: 2  •  glucose blood (Accu-Chek Marisela Plus) test strip, USE STRIP TO CHECK GLUCOSE ONCE OR TWICE DAILY, Disp: 100 each, Rfl: 0  •  lisinopril-hydrochlorothiazide (PRINZIDE,ZESTORETIC) 20-12.5 MG per tablet, Take 1 tablet by mouth twice daily, Disp: 180 tablet, Rfl: 0  •  metFORMIN (GLUCOPHAGE) 1000 MG tablet, Take 1 tablet by mouth twice daily, Disp: 180 tablet, Rfl: 0  •  potassium chloride 10 MEQ CR tablet, Take 1 tablet by mouth twice daily, Disp: 180 tablet, Rfl: 0  •  traZODone (DESYREL) 100 MG tablet, TAKE 1 TABLET BY MOUTH ONCE DAILY AT BEDTIME, Disp: 90 tablet, Rfl: 0  •  Trulicity 4.5 MG/0.5ML solution pen-injector, , Disp: , Rfl:   •  aspirin (EQ Aspirin Adult Low Dose) 81 MG EC tablet, Take 1 tablet by mouth Daily. (Patient not taking: Reported on 4/18/2023), Disp: 90 tablet, Rfl: 3    Vital Signs:   Vitals:    04/18/23 1054   BP: 140/96   BP Location: Right arm   Patient Position: Sitting   Cuff Size: Large Adult   Pulse: 76   Resp: 16   Weight: 112 kg (247 lb 6.4 oz)   Height: 180.3 cm (71\")         Physical Exam:  Physical Exam  Vitals reviewed.   Constitutional:       General: He is not in acute distress.     Appearance: Normal appearance.   Neck:      Vascular: No carotid bruit.   Cardiovascular:      Rate and Rhythm: Normal rate and regular rhythm.      Heart sounds: Normal heart sounds. No murmur heard.  Pulmonary:      Effort: Pulmonary effort is normal. No " respiratory distress.      Breath sounds: Normal breath sounds.   Musculoskeletal:      Right lower leg: No edema.      Left lower leg: No edema.   Neurological:      Mental Status: He is alert.   Psychiatric:         Mood and Affect: Mood normal.         Behavior: Behavior normal.         Result Review      The following data was reviewed by: ERUM Charles on 04/18/2023:    Results for orders placed or performed in visit on 04/06/23   Comprehensive Metabolic Panel    Specimen: Blood   Result Value Ref Range    Glucose 161 (H) 65 - 99 mg/dL    BUN 11 8 - 23 mg/dL    Creatinine 0.96 0.76 - 1.27 mg/dL    Sodium 142 136 - 145 mmol/L    Potassium 3.2 (L) 3.5 - 5.2 mmol/L    Chloride 105 98 - 107 mmol/L    CO2 24.6 22.0 - 29.0 mmol/L    Calcium 9.3 8.6 - 10.5 mg/dL    Total Protein 7.1 6.0 - 8.5 g/dL    Albumin 4.3 3.5 - 5.2 g/dL    ALT (SGPT) 11 1 - 41 U/L    AST (SGOT) 15 1 - 40 U/L    Alkaline Phosphatase 81 39 - 117 U/L    Total Bilirubin 0.2 0.0 - 1.2 mg/dL    Globulin 2.8 gm/dL    A/G Ratio 1.5 g/dL    BUN/Creatinine Ratio 11.5 7.0 - 25.0    Anion Gap 12.4 5.0 - 15.0 mmol/L    eGFR 86.6 >60.0 mL/min/1.73   Vitamin B12    Specimen: Blood   Result Value Ref Range    Vitamin B-12 577 211 - 946 pg/mL   Methylmalonic Acid, Serum    Specimen: Blood   Result Value Ref Range    Methylmalonic Acid 460 (H) 0 - 378 nmol/L   Folate    Specimen: Blood   Result Value Ref Range    Folate 6.27 4.78 - 24.20 ng/mL   CK    Specimen: Blood   Result Value Ref Range    Creatine Kinase 120 20 - 200 U/L   Aldolase    Specimen: Blood   Result Value Ref Range    Aldolase 2.7 (L) 3.3 - 10.3 U/L   CBC Auto Differential    Specimen: Blood   Result Value Ref Range    WBC 5.83 3.40 - 10.80 10*3/mm3    RBC 4.80 4.14 - 5.80 10*6/mm3    Hemoglobin 13.7 13.0 - 17.7 g/dL    Hematocrit 40.6 37.5 - 51.0 %    MCV 84.6 79.0 - 97.0 fL    MCH 28.5 26.6 - 33.0 pg    MCHC 33.7 31.5 - 35.7 g/dL    RDW 13.1 12.3 - 15.4 %    RDW-SD 41.0 37.0 - 54.0  fl    MPV 9.9 6.0 - 12.0 fL    Platelets 245 140 - 450 10*3/mm3    Neutrophil % 53.7 42.7 - 76.0 %    Lymphocyte % 33.4 19.6 - 45.3 %    Monocyte % 6.2 5.0 - 12.0 %    Eosinophil % 5.8 0.3 - 6.2 %    Basophil % 0.9 0.0 - 1.5 %    Immature Grans % 0.0 0.0 - 0.5 %    Neutrophils, Absolute 3.13 1.70 - 7.00 10*3/mm3    Lymphocytes, Absolute 1.95 0.70 - 3.10 10*3/mm3    Monocytes, Absolute 0.36 0.10 - 0.90 10*3/mm3    Eosinophils, Absolute 0.34 0.00 - 0.40 10*3/mm3    Basophils, Absolute 0.05 0.00 - 0.20 10*3/mm3    Immature Grans, Absolute 0.00 0.00 - 0.05 10*3/mm3               Assessment and Plan:          Diagnoses and all orders for this visit:    1. Numbness and tingling (Primary)  Assessment & Plan:  I reviewed MRI, he will follow up with Dr Yap as directed       2. Chronic hypokalemia  Assessment & Plan:  Rechecking K, may need a dose adjustment in his rx     Orders:  -     Basic metabolic panel; Future    3. Essential hypertension  Assessment & Plan:  I reviewed his BP from his neurology visit earlier today. Hypertension is stable. Continue to monitor BP at home. Continue current meds. Continue to modify diet and lifestyle.will recheck his BMP, reviewed labs with pt   He can only get texts now, this is his number [phone] but he is getting a new phone soon, also can call his daughter and leave him a message             Follow Up   Return for followup pending lab results.  Patient was given instructions and counseling regarding his condition or for health maintenance advice. Please see specific information pulled into the AVS if appropriate.

## 2023-04-18 NOTE — ASSESSMENT & PLAN NOTE
I reviewed his BP from his neurology visit earlier today. Hypertension is stable. Continue to monitor BP at home. Continue current meds. Continue to modify diet and lifestyle.will recheck his BMP, reviewed labs with pt   He can only get texts now, this is his number [phone] but he is getting a new phone soon, also can call his daughter and leave him a message

## 2023-04-18 NOTE — PROGRESS NOTES
This is a very pleasant 67-year-old gentleman who had seen in initial consultation for paresthesias and muscle cramping in his upper extremities he is here today to review testing.  He reports no new neurologic issues since I saw him last.        I reviewed his EMG nerve conduction studies which showed an early onset sensory primarily demyelinating polyneuropathy at the evident in both upper extremities he also had a sensorimotor primary demyelinating affecting the right greater than left ulnar nerves at about the elbows.  His laboratory studies were essentially unremarkable with the exception of having low potassium he will follow-up with his primary care doctor regarding the potassium and already called him on the phone regarding this laboratory study.  He is MRI of the cervical cord showed degenerative changes throughout the neck without any obvious cord involvement.        In summary this a very pleasant 67-year-old gentleman who has developed diabetic polyneuropathy as well as a focal neuropathy at the elbow.  I am going to refer him for some physical therapy and start him on Neurontin 100 mg p.o. 3 times daily I reviewed the risks and benefits of this medication with him at length hopefully this will help with some of the neuropathic pain he is having.  I will refer him to neurosurgery given the multilevel degenerative disc disease.  He will follow-up with me on an as-needed basis.  Of course if he were to have any new neurologic issues or worsening of his neurologic function I be happy to see him again.      Diagnoses and all orders for this visit:    1. Diabetic polyneuropathy associated with type 2 diabetes mellitus (Primary)  -     gabapentin (Neurontin) 100 MG capsule; Take 1 capsule by mouth 3 (Three) Times a Day.  Dispense: 90 capsule; Refill: 2  -     Ambulatory Referral to Physical Therapy Evaluate and treat    2. Spinal stenosis in cervical region  -     Ambulatory Referral to Neurosurgery      Thank  you again for involving me in the care of this patient.

## 2023-04-19 LAB
ANION GAP SERPL CALCULATED.3IONS-SCNC: 8 MMOL/L (ref 5–15)
BUN SERPL-MCNC: 10 MG/DL (ref 8–23)
BUN/CREAT SERPL: 9.9 (ref 7–25)
CALCIUM SPEC-SCNC: 10 MG/DL (ref 8.6–10.5)
CHLORIDE SERPL-SCNC: 102 MMOL/L (ref 98–107)
CO2 SERPL-SCNC: 29 MMOL/L (ref 22–29)
CREAT SERPL-MCNC: 1.01 MG/DL (ref 0.76–1.27)
EGFRCR SERPLBLD CKD-EPI 2021: 81.5 ML/MIN/1.73
GLUCOSE SERPL-MCNC: 143 MG/DL (ref 65–99)
POTASSIUM SERPL-SCNC: 3.8 MMOL/L (ref 3.5–5.2)
SODIUM SERPL-SCNC: 139 MMOL/L (ref 136–145)

## 2023-04-28 DIAGNOSIS — I10 ESSENTIAL (PRIMARY) HYPERTENSION: ICD-10-CM

## 2023-04-28 RX ORDER — LISINOPRIL AND HYDROCHLOROTHIAZIDE 20; 12.5 MG/1; MG/1
TABLET ORAL
Qty: 180 TABLET | Refills: 0 | Status: SHIPPED | OUTPATIENT
Start: 2023-04-28

## 2023-04-28 RX ORDER — DAPAGLIFLOZIN 10 MG/1
1 TABLET, FILM COATED ORAL DAILY
Qty: 90 TABLET | Refills: 1 | Status: SHIPPED | OUTPATIENT
Start: 2023-04-28 | End: 2023-05-05 | Stop reason: SDUPTHER

## 2023-05-01 DIAGNOSIS — G47.00 INSOMNIA, UNSPECIFIED TYPE: ICD-10-CM

## 2023-05-01 RX ORDER — TRAZODONE HYDROCHLORIDE 100 MG/1
TABLET ORAL
Qty: 90 TABLET | Refills: 0 | Status: SHIPPED | OUTPATIENT
Start: 2023-05-01

## 2023-05-04 DIAGNOSIS — I10 ESSENTIAL (PRIMARY) HYPERTENSION: ICD-10-CM

## 2023-05-05 DIAGNOSIS — E11.65 UNCONTROLLED TYPE 2 DIABETES MELLITUS WITH HYPERGLYCEMIA: Primary | ICD-10-CM

## 2023-05-05 RX ORDER — CLONIDINE HYDROCHLORIDE 0.2 MG/1
0.1 TABLET ORAL EVERY 12 HOURS SCHEDULED
Qty: 45 TABLET | Refills: 0 | Status: SHIPPED | OUTPATIENT
Start: 2023-05-05

## 2023-05-05 RX ORDER — DAPAGLIFLOZIN 10 MG/1
1 TABLET, FILM COATED ORAL DAILY
Qty: 90 TABLET | Refills: 1 | Status: SHIPPED | OUTPATIENT
Start: 2023-05-05 | End: 2023-11-01

## 2023-05-05 RX ORDER — AMLODIPINE BESYLATE 10 MG/1
TABLET ORAL
Qty: 90 TABLET | Refills: 0 | Status: SHIPPED | OUTPATIENT
Start: 2023-05-05

## 2023-05-05 NOTE — TELEPHONE ENCOUNTER
Rx Refill Note  Requested Prescriptions     Pending Prescriptions Disp Refills   • cloNIDine (CATAPRES) 0.2 MG tablet [Pharmacy Med Name: cloNIDine HCl 0.2 MG Oral Tablet] 45 tablet 0     Sig: Take 1/2 (one-half) tablet by mouth twice daily   • amLODIPine (NORVASC) 10 MG tablet [Pharmacy Med Name: amLODIPine Besylate 10 MG Oral Tablet] 90 tablet 0     Sig: Take 1 tablet by mouth once daily      Last office visit with prescribing clinician: 4/18/2023     Next office visit with prescribing clinician: Visit date not found       Last BMP  Basic metabolic panel (04/18/2023 11:27)    {Hermelinda Grijalva LPN  05/05/23, 08:52 EDT

## 2023-05-09 DIAGNOSIS — E78.5 HYPERLIPIDEMIA, UNSPECIFIED HYPERLIPIDEMIA TYPE: ICD-10-CM

## 2023-05-09 RX ORDER — ATORVASTATIN CALCIUM 20 MG/1
TABLET, FILM COATED ORAL
Qty: 90 TABLET | Refills: 0 | Status: SHIPPED | OUTPATIENT
Start: 2023-05-09

## 2023-05-12 ENCOUNTER — OFFICE VISIT (OUTPATIENT)
Dept: NEUROSURGERY | Facility: CLINIC | Age: 68
End: 2023-05-12
Payer: MEDICARE

## 2023-05-12 VITALS
WEIGHT: 250 LBS | SYSTOLIC BLOOD PRESSURE: 152 MMHG | DIASTOLIC BLOOD PRESSURE: 98 MMHG | BODY MASS INDEX: 35 KG/M2 | HEIGHT: 71 IN | HEART RATE: 95 BPM

## 2023-05-12 DIAGNOSIS — M50.30 DDD (DEGENERATIVE DISC DISEASE), CERVICAL: Primary | ICD-10-CM

## 2023-05-12 DIAGNOSIS — M54.2 CERVICALGIA: ICD-10-CM

## 2023-05-12 NOTE — PROGRESS NOTES
"Chief Complaint  Neck Pain, Arm Pain (Bilateral arms (right) to finger tips), Numbness (Bilateral arms (right) to finger tips/), and Tingling (Bilateral arms (right) to finger tips/)    Subjective          Maksim Acosta who is a 67 y.o. year old male who presents to [facility] for Evaluation of the Spine.     The patient complains of pain located in the Cervical Spine.  Patients states the pain has been present for 2 years.  The pain came on gradually.  The pain scaled level is 8.  The pain does radiate. Dermatomes are located bilaterally Cervical at: to the 3rd and 4th digits, right greater than left..  The pain is constant and waxing/waning and described as aching, burning and throbbing.  The pain is worse as the day goes on. Patient states nothing improves the pain.  Patient states moving or doing things, sometimes just sitting and watching TV makes the pain worse.    Associated Symptoms Include: Numbness, Tingling and Weakness  Conservative Interventions Include: NSAIDs that were not very effective. and Gabapentin that was ineffective.    Was this the result of an injury or accident?: No    History of Previous Spinal Surgery?: No     reports that he quit smoking about 11 years ago. His smoking use included cigarettes. He has a 7.50 pack-year smoking history. He has never used smokeless tobacco.    Review of Systems   Musculoskeletal: Positive for neck pain and neck stiffness.        Objective   Vital Signs:   /98   Pulse 95   Ht 180.3 cm (71\")   Wt 113 kg (250 lb)   BMI 34.87 kg/m²       Physical Exam  Constitutional:       Appearance: Normal appearance. He is obese.   Neck:      Comments: Pain with ROM  Pulmonary:      Effort: Pulmonary effort is normal.   Musculoskeletal:         General: Tenderness (midline cervical spine, bilateral cervical paraspinals, trapezius and rhomboids) present.      Comments: Rodgers's negative bilaterally, Tinel's testing " negative   Neurological:      General: No focal deficit present.      Mental Status: He is alert and oriented to person, place, and time.      Sensory: No sensory deficit.      Motor: No weakness.      Deep Tendon Reflexes: Reflexes normal.   Psychiatric:         Mood and Affect: Mood normal.         Behavior: Behavior normal.        Neurologic Exam     Mental Status   Oriented to person, place, and time.        Result Review      I have personally reviewed the MRI of cervical spine without contrast from 4/1/2023 which shows multilevel degenerative changes worse at C5-C6 where there is at least mild central canal stenosis and moderate foraminal narrowing on the left.  There is mild to moderate central canal narrowing at C3-C4.     Assessment and Plan    Diagnoses and all orders for this visit:    1. DDD (degenerative disc disease), cervical (Primary)    2. Cervicalgia    His pain is in the right greater than left arm to the 3rd and 4th digit.    HE does have at least some stenosis foraminally at C6-C7 which may contribute to C7 nerve pain, but he also had positive testing on NCV/EMG for bilateral ulnar nerve radiculopathy.    I suspect that the ulnar nerves are more likely to be the source of his pain.    He would like to discuss a possible surgical approach for the ulnar nerves. I will schedule him to return to clinic on the next available Thursday to discuss this with Dr. Abbott and to review the cervical imaging as well.    Follow Up   Return in about 27 days (around 6/8/2023).  Patient was given instructions and counseling regarding his condition or for health maintenance advice. Please see specific information pulled into the AVS if appropriate.

## 2023-05-17 ENCOUNTER — TREATMENT (OUTPATIENT)
Dept: PHYSICAL THERAPY | Facility: CLINIC | Age: 68
End: 2023-05-17
Payer: MEDICARE

## 2023-05-17 DIAGNOSIS — R53.1 DECREASED STRENGTH, ENDURANCE, AND MOBILITY: ICD-10-CM

## 2023-05-17 DIAGNOSIS — M54.2 CERVICAL PAIN: ICD-10-CM

## 2023-05-17 DIAGNOSIS — M79.601 BILATERAL ARM PAIN: ICD-10-CM

## 2023-05-17 DIAGNOSIS — Z74.09 DECREASED STRENGTH, ENDURANCE, AND MOBILITY: ICD-10-CM

## 2023-05-17 DIAGNOSIS — R68.89 DECREASED STRENGTH, ENDURANCE, AND MOBILITY: ICD-10-CM

## 2023-05-17 DIAGNOSIS — M54.12 RADICULOPATHY, CERVICAL: ICD-10-CM

## 2023-05-17 DIAGNOSIS — M79.602 BILATERAL ARM PAIN: ICD-10-CM

## 2023-05-17 DIAGNOSIS — E11.42 DIABETIC POLYNEUROPATHY ASSOCIATED WITH TYPE 2 DIABETES MELLITUS: Primary | ICD-10-CM

## 2023-05-17 PROCEDURE — 97161 PT EVAL LOW COMPLEX 20 MIN: CPT | Performed by: PHYSICAL THERAPIST

## 2023-05-17 NOTE — PROGRESS NOTES
Physical Therapy Initial Evaluation and Plan of Care      Patient: Maksim Acosta   : 1955  Diagnosis/ICD-10 Code:  Diabetic polyneuropathy associated with type 2 diabetes mellitus [E11.42]  Referring practitioner: Eben Yap DO  Date of Initial Visit: 2023  Today's Date: 2023  Patient seen for 1 sessions         Visit Diagnoses:    ICD-10-CM ICD-9-CM   1. Diabetic polyneuropathy associated with type 2 diabetes mellitus  E11.42 250.60     357.2   2. Decreased strength, endurance, and mobility  R53.1 780.79    Z74.09 780.99    R68.89    3. Radiculopathy, cervical  M54.12 723.4   4. Cervical pain  M54.2 723.1   5. Bilateral arm pain  M79.601 729.5    M79.602          Subjective Evaluation    History of Present Illness  Mechanism of injury: Pt comes today with little neck pain, not too much.  He notes he has stenosis/narrowing in the spine.  He has had neck/back problems since he was working in the .  He has a herniated disc.  He used to deliver shingles, used to twist and lift.     He has had a nerve conduction study and an MRI on the neck.  About a month ago, he was diagnosed with neuropathy.   He saw HANANE Yap DO, referred for OPPT.     Maksim saw LUANN Caruso, last week.  He has referred him to see a neurosurgeon regarding possible surgery on .    He has neuropathy down the arms and hands, R >L.  The tops of the hand, knuckles, digits 1-4 bother him.  He has pain, he notes he hollers out.  It will last a minute or so.  He has a pinched nerve at the R elbow.     The arms bother him more than the neck.            Pain  Current pain ratin  Quality: burning and radiating  Alleviating factors: Advil.  Aggravating factors: lifting, outstretched reach, repetitive movement, prolonged positioning and overhead activity    Social Support  L[facility] in: [facility]  Lives with: alone    Hand dominance: right    Treatments  Previous treatment: physical  therapy  Patient Goals  Patient goals for therapy: decreased pain, increased motion, increased strength, independence with ADLs/IADLs and return to sport/leisure activities             Objective           General Comments     Cervical/Thoracic Comments  Cervical AROM:  Flexion: 30 degrees  Extension: 30 degrees  RSB: 40 degrees  LSB: 40 degrees  R rotation: 70 degrees  L rotation: 70 degrees    Radiculopathy is present down the B upper extremity, ulnar and median nn pathway    Special tests:  Distraction: relief noted    Tenderness: R RCR incision, R wrist extensors at elbow, L wrist extensors at elbow, L biceps, L forearm, L upper trap    Posture:  Rounded shoulders, forwards head    Shoulder AROM WFL both R/L  Shoudler flexion, abd 3+/5 R/L  Elbow AROM WFL  Elbow R/L 4-/5 flexion and extension             Assessment & Plan     Assessment  Impairments: abnormal or restricted ROM, activity intolerance, impaired physical strength, lacks appropriate home exercise program and pain with function  Functional Limitations: carrying objects, lifting, pulling, pushing, uncomfortable because of pain and unable to perform repetitive tasks  Assessment details: Pt presents with limitations, noted below, that impede his ability to perform recreational activities, hobbies, cutting food, washing his back. The skills of a therapist will be required to safely and effectively implement the following treatment plan to restore maximal level of function.        Goals  Plan Goals: CERVICAL PROBLEMS    1. The patient has limited ROM.    LTG 1: 12 weeks:  The patient will demonstrate 80° of bilateral cervical spine rotation in order to adequately monitor blind spots while driving and improve ability to perform activities of daily living.    STATUS:  New  STG 1a: 4 weeks:  The patient will demonstrate 75° of bilateral cervical spine rotation, in order to adequately monitor blind spots while driving and improve ability to perform activities  of daily living.       STATUS:  New   TREATMENT:  Manual therapy, therapeutic exercise, home exercise instruction, cervical traction, and modalities as needed to include: moist heat, electrical stimulation, and ultrasound.     2. The patient has complaints of pain.   LTG 2: 12 weeks:  The patient will report 2/10 pain in order to more easily tolerate activities of daily living and improve sleep quality.    STATUS:  New   STG 2a: 4 weeks:  The patient will report 5/10 pain.    STATUS:  New  STG2b:  4 weeks:  The patient will be independent with home exercises.     STATUS:  New   TREATMENT: Manual therapy, therapeutic exercise, home exercise instruction, cervical traction, and modalities as needed to include: moist heat, electrical stimulation, and ultrasound.    3. The patient reports radicular symptoms in the B upper extremity.   LTG 3: 12 weeks:  The patient will report a decrease in radicular symptoms in the B upper extremity by 75%.    STATUS:  New   STG 3a: 4 weeks:  The patient will report a decrease in radicular symptoms in the B upper extremity by 50%.    STATUS:  New   TREATMENT: Manual therapy, therapeutic exercise, home exercise instruction, cervical traction, and modalities as needed to include: moist heat, electrical stimulation, and ultrasound.    4. Carrying, Moving, and Handling Objects Functional Limitation     LTG 4: 12 weeks:  The patient will demonstrate 18% limitation by achieving a score  on the Neck Disability Index.    STATUS:  New   STG 4a: 4 weeks:  The patient will demonstrate 28% limitation by achieving a score on the Neck Disability Index.      STATUS:  New         Plan  Therapy options: will be seen for skilled therapy services  Planned modality interventions: cryotherapy, dry needling, TENS, thermotherapy (hydrocollator packs), traction and ultrasound  Planned therapy interventions: flexibility, functional ROM exercises, home exercise program, manual therapy, neuromuscular re-education,  postural training, soft tissue mobilization, spinal/joint mobilization, strengthening, stretching, therapeutic activities, body mechanics training, IADL retraining and joint mobilization  Frequency: 1x week  Duration in weeks: 12  Treatment plan discussed with: patient  Plan details: Create an HEP, update as needed.    Progress with cervical and scapular strength, trunk control/postural awareness, increased stamina, decreased tightness, improved ROM/flexibility, decrease trigger points, decrease radicular symptoms, education as needed.    He goes back for a neuro consultation with surgeon on June 8.           History # of Personal Factors and/or Comorbidities: HIGH (3+)  Examination of Body System(s): # of elements: MODERATE (3)  Clinical Presentation: STABLE   Clinical Decision Making: LOW     Timed:  Manual Therapy:         mins  35868;  Therapeutic Exercise:    5     mins  91212;     Neuromuscular Kwadwo:        mins  39311;    Therapeutic Activity:          mins  21086;     Gait Training:           mins  57096;     Ultrasound:          mins  94234;    Canalith Repos           ___  mins  39977      Untimed:  Electrical Stimulation:         mins  05442 (MC );  Mechanical Traction:        mins  33979;   Dry Needling:                     mins self pay  Low Eval:                      32     mins  82951;  Medium Eval:                     mins  53959;   High Eval:                          mins  16005       Timed Treatment:   5   mins   Total Treatment:     37   mins    PT SIGNATURE: Zarina Ojeda PT, DPT  [place] License: [id number]  Electronically signed by Zarina Ojeda PT, 05/17/23, 7:50 AM EDT      Initial Certification    Certification Period: 5/17/2023 thru 8/14/2023  I certify that the therapy services are furnished while this patient is under my care.  The services outlined above are required by this patient, and will be reviewed every 90 days.     PHYSICIAN: Eben Yap DO  NPI: [id number]            PHYSICIAN  PRINT NAME: ______________________________________________      PHYSICIAN SIGNATURE: ______________________________________________         DATE:________________________________        Please sign and return via fax to [phone].  Thank you, [facility].

## 2023-05-24 ENCOUNTER — TREATMENT (OUTPATIENT)
Dept: PHYSICAL THERAPY | Facility: CLINIC | Age: 68
End: 2023-05-24
Payer: MEDICARE

## 2023-05-24 DIAGNOSIS — M54.12 RADICULOPATHY, CERVICAL: ICD-10-CM

## 2023-05-24 DIAGNOSIS — E11.42 DIABETIC POLYNEUROPATHY ASSOCIATED WITH TYPE 2 DIABETES MELLITUS: Primary | ICD-10-CM

## 2023-05-24 DIAGNOSIS — M54.2 CERVICAL PAIN: ICD-10-CM

## 2023-05-24 DIAGNOSIS — R53.1 DECREASED STRENGTH, ENDURANCE, AND MOBILITY: ICD-10-CM

## 2023-05-24 DIAGNOSIS — R68.89 DECREASED STRENGTH, ENDURANCE, AND MOBILITY: ICD-10-CM

## 2023-05-24 DIAGNOSIS — Z74.09 DECREASED STRENGTH, ENDURANCE, AND MOBILITY: ICD-10-CM

## 2023-05-24 DIAGNOSIS — M79.602 BILATERAL ARM PAIN: ICD-10-CM

## 2023-05-24 DIAGNOSIS — M79.601 BILATERAL ARM PAIN: ICD-10-CM

## 2023-05-24 PROCEDURE — 97530 THERAPEUTIC ACTIVITIES: CPT | Performed by: PHYSICAL THERAPIST

## 2023-05-24 PROCEDURE — 97110 THERAPEUTIC EXERCISES: CPT | Performed by: PHYSICAL THERAPIST

## 2023-05-24 NOTE — PROGRESS NOTES
Physical Therapy Daily Treatment Note      Patient: Maksim Acosta   : 1955  Referring practitioner: Eben Yap DO  Date of Initial Visit: Type: THERAPY  Noted: 2023  Today's Date: 2023  Patient seen for 2 sessions           Subjective Evaluation    History of Present Illness    Subjective comment: 8/10       Objective   See Exercise, Manual, and Modality Logs for complete treatment.       Assessment & Plan     Assessment  Impairments: abnormal or restricted ROM, activity intolerance, impaired physical strength, lacks appropriate home exercise program and pain with function  Functional Limitations: carrying objects, lifting, pulling, pushing, uncomfortable because of pain and unable to perform repetitive tasks  Assessment details: Pt required vc and demonstration of all exercises due to this being his first tx visit.       Goals  Plan Goals: CERVICAL PROBLEMS    1. The patient has limited ROM.    LTG 1: 12 weeks:  The patient will demonstrate 80° of bilateral cervical spine rotation in order to adequately monitor blind spots while driving and improve ability to perform activities of daily living.    STATUS:  Progressing  STG 1a: 4 weeks:  The patient will demonstrate 75° of bilateral cervical spine rotation, in order to adequately monitor blind spots while driving and improve ability to perform activities of daily living.       STATUS:  Progressing   TREATMENT:  Manual therapy, therapeutic exercise, home exercise instruction, cervical traction, and modalities as needed to include: moist heat, electrical stimulation, and ultrasound.     2. The patient has complaints of pain.   LTG 2: 12 weeks:  The patient will report 2/10 pain in order to more easily tolerate activities of daily living and improve sleep quality.    STATUS:  Progressing   STG 2a: 4 weeks:  The patient will report 5/10 pain.    STATUS:  Progressing  STG2b:  4 weeks:  The patient will be independent with home exercises.      STATUS:  Progressing   TREATMENT: Manual therapy, therapeutic exercise, home exercise instruction, cervical traction, and modalities as needed to include: moist heat, electrical stimulation, and ultrasound.    3. The patient reports radicular symptoms in the B upper extremity.   LTG 3: 12 weeks:  The patient will report a decrease in radicular symptoms in the B upper extremity by 75%.    STATUS:  Progressing   STG 3a: 4 weeks:  The patient will report a decrease in radicular symptoms in the B upper extremity by 50%.    STATUS:  Progressing   TREATMENT: Manual therapy, therapeutic exercise, home exercise instruction, cervical traction, and modalities as needed to include: moist heat, electrical stimulation, and ultrasound.    4. Carrying, Moving, and Handling Objects Functional Limitation     LTG 4: 12 weeks:  The patient will demonstrate 18% limitation by achieving a score  on the Neck Disability Index.    STATUS:  Progressing   STG 4a: 4 weeks:  The patient will demonstrate 28% limitation by achieving a score on the Neck Disability Index.      STATUS:  Progressing         Plan  Therapy options: will be seen for skilled therapy services  Planned modality interventions: cryotherapy, dry needling, TENS, thermotherapy (hydrocollator packs), traction and ultrasound  Planned therapy interventions: flexibility, functional ROM exercises, home exercise program, manual therapy, neuromuscular re-education, postural training, soft tissue mobilization, spinal/joint mobilization, strengthening, stretching, therapeutic activities, body mechanics training, IADL retraining and joint mobilization  Frequency: 1x week  Duration in weeks: 12  Treatment plan discussed with: patient  Plan details: Cont to address cervical weakness and posturing. Add stretches and manual as needed.          Visit Diagnoses:    ICD-10-CM ICD-9-CM   1. Diabetic polyneuropathy associated with type 2 diabetes mellitus  E11.42 250.60     357.2   2.  Decreased strength, endurance, and mobility  R53.1 780.79    Z74.09 780.99    R68.89    3. Radiculopathy, cervical  M54.12 723.4   4. Cervical pain  M54.2 723.1   5. Bilateral arm pain  M79.601 729.5    M79.602        Progress per Plan of Care    Timed:    Therapeutic Exercise:    18     mins  82429;  Manual Therapy:         mins  03177;     Neuromuscular Kwadwo:       mins  04336;    Therapeutic Activity:     10     mins  49362;     Gait Training:           mins  82242;     Ultrasound:          mins  34251;      Untimed:  Electrical Stimulation:         mins  71862 ( );  Mechanical Traction:         mins  65547;     Timed Treatment:   28   mins   Total Treatment:     30   mins      Yolanda Rivero PTA  Physical Therapist Assistant

## 2023-05-31 DIAGNOSIS — I10 ESSENTIAL (PRIMARY) HYPERTENSION: ICD-10-CM

## 2023-05-31 RX ORDER — LISINOPRIL AND HYDROCHLOROTHIAZIDE 20; 12.5 MG/1; MG/1
TABLET ORAL
Qty: 180 TABLET | Refills: 0 | Status: SHIPPED | OUTPATIENT
Start: 2023-05-31

## 2023-05-31 RX ORDER — POTASSIUM CHLORIDE 750 MG/1
TABLET, FILM COATED, EXTENDED RELEASE ORAL
Qty: 180 TABLET | Refills: 0 | Status: SHIPPED | OUTPATIENT
Start: 2023-05-31

## 2023-05-31 NOTE — TELEPHONE ENCOUNTER
Rx Refill Note  Requested Prescriptions     Pending Prescriptions Disp Refills   • potassium chloride 10 MEQ CR tablet [Pharmacy Med Name: Potassium Chloride ER 10 MEQ Oral Tablet Extended Release] 180 tablet 0     Sig: Take 1 tablet by mouth twice daily      Last office visit with prescribing clinician: 4/18/2023   Last telemedicine visit with prescribing clinician: Visit date not found   Next office visit with prescribing clinician: Visit date not found     Brenda Genao LPN  05/31/23, 13:16 EDT     LF-2/10/23 #180    Basic metabolic panel (04/18/2023 11:27)

## 2023-07-31 DIAGNOSIS — G47.00 INSOMNIA, UNSPECIFIED TYPE: ICD-10-CM

## 2023-07-31 RX ORDER — TRAZODONE HYDROCHLORIDE 100 MG/1
TABLET ORAL
Qty: 90 TABLET | Refills: 0 | Status: SHIPPED | OUTPATIENT
Start: 2023-07-31

## 2023-08-11 RX ORDER — DULAGLUTIDE 4.5 MG/.5ML
INJECTION, SOLUTION SUBCUTANEOUS
Qty: 2 ML | OUTPATIENT
Start: 2023-08-11

## 2023-08-14 ENCOUNTER — DOCUMENTATION (OUTPATIENT)
Dept: PHYSICAL THERAPY | Facility: CLINIC | Age: 68
End: 2023-08-14

## 2023-08-14 DIAGNOSIS — M79.602 BILATERAL ARM PAIN: ICD-10-CM

## 2023-08-14 DIAGNOSIS — R53.1 DECREASED STRENGTH, ENDURANCE, AND MOBILITY: ICD-10-CM

## 2023-08-14 DIAGNOSIS — R68.89 DECREASED STRENGTH, ENDURANCE, AND MOBILITY: ICD-10-CM

## 2023-08-14 DIAGNOSIS — M54.2 CERVICAL PAIN: ICD-10-CM

## 2023-08-14 DIAGNOSIS — M54.12 RADICULOPATHY, CERVICAL: ICD-10-CM

## 2023-08-14 DIAGNOSIS — Z74.09 DECREASED STRENGTH, ENDURANCE, AND MOBILITY: ICD-10-CM

## 2023-08-14 DIAGNOSIS — E11.42 DIABETIC POLYNEUROPATHY ASSOCIATED WITH TYPE 2 DIABETES MELLITUS: Primary | ICD-10-CM

## 2023-08-14 DIAGNOSIS — M79.601 BILATERAL ARM PAIN: ICD-10-CM

## 2023-08-14 NOTE — PROGRESS NOTES
Discharge Summary  Discharge Summary from Physical Therapy Report    Patient Information  Maksim Acosta  1955  Diagnosis/ICD - 10 Code:  Diabetic polyneuropathy associated with type 2 diabetes mellitus [E11.42]  Referring practitoner: No ref. provider found  Date of initial visit: 8/14/2023  Today's date: 8/14/2023  Patient was seen for Visit count could not be calculated. Make sure you are using a visit which is associated with an episode. sessions      Visit Diagnoses:    ICD-10-CM ICD-9-CM   1. Diabetic polyneuropathy associated with type 2 diabetes mellitus  E11.42 250.60     357.2   2. Decreased strength, endurance, and mobility  R53.1 780.79    Z74.09 780.99    R68.89    3. Radiculopathy, cervical  M54.12 723.4   4. Cervical pain  M54.2 723.1   5. Bilateral arm pain  M79.601 729.5    M79.602          Comments:  Pt will be discharged from PT services per attendance policy, he has had three no shows.           Zarina Ojeda, PT, DPT  [place] License #: [id number]

## 2023-08-15 RX ORDER — DULAGLUTIDE 4.5 MG/.5ML
INJECTION, SOLUTION SUBCUTANEOUS
Qty: 2 ML | Refills: 0 | Status: SHIPPED | OUTPATIENT
Start: 2023-08-15

## 2023-08-25 DIAGNOSIS — E11.65 TYPE 2 DIABETES MELLITUS WITH HYPERGLYCEMIA, WITHOUT LONG-TERM CURRENT USE OF INSULIN: ICD-10-CM

## 2023-08-25 DIAGNOSIS — I10 ESSENTIAL (PRIMARY) HYPERTENSION: ICD-10-CM

## 2023-08-25 RX ORDER — AMLODIPINE BESYLATE 10 MG/1
TABLET ORAL
Qty: 90 TABLET | Refills: 0 | Status: SHIPPED | OUTPATIENT
Start: 2023-08-25

## 2023-08-25 NOTE — TELEPHONE ENCOUNTER
Rx Refill Note  Requested Prescriptions     Pending Prescriptions Disp Refills    metFORMIN (GLUCOPHAGE) 1000 MG tablet [Pharmacy Med Name: metFORMIN HCl 1000 MG Oral Tablet] 60 tablet 0     Sig: TAKE 1 TABLET BY MOUTH TWICE DAILY NEEDS  APPOINTMENT      Last office visit with prescribing clinician: 4/18/2023   Last telemedicine visit with prescribing clinician: Visit date not found   Next office visit with prescribing clinician: Visit date not found  Hemoglobin A1c (01/10/2023 17:17)    Antihyperglycemics Protocol Failed      Fabiola Sousa LPN  08/25/23, 08:53 EDT

## 2023-08-25 NOTE — TELEPHONE ENCOUNTER
Appt 10/03/23 at 9:15 am with ONEIL alfaro.    [Family History Reviewed and Updated] : family history reviewed and updated

## 2023-08-25 NOTE — TELEPHONE ENCOUNTER
Rx Refill Note  Requested Prescriptions     Pending Prescriptions Disp Refills    amLODIPine (NORVASC) 10 MG tablet [Pharmacy Med Name: amLODIPine Besylate 10 MG Oral Tablet] 90 tablet 0     Sig: Take 1 tablet by mouth once daily      Last office visit with prescribing clinician: 4/18/2023   Last telemedicine visit with prescribing clinician: Visit date not found   Next office visit with prescribing clinician: 10/3/2023  Calcium-Channel Blockers Protocol Failed       Fabiola Sousa LPN  08/25/23, 11:49 EDT

## 2023-08-28 DIAGNOSIS — I10 ESSENTIAL (PRIMARY) HYPERTENSION: ICD-10-CM

## 2023-08-28 RX ORDER — CLONIDINE HYDROCHLORIDE 0.2 MG/1
0.1 TABLET ORAL EVERY 12 HOURS SCHEDULED
Qty: 90 TABLET | Refills: 0 | Status: SHIPPED | OUTPATIENT
Start: 2023-08-28

## 2023-09-11 RX ORDER — DULAGLUTIDE 4.5 MG/.5ML
INJECTION, SOLUTION SUBCUTANEOUS
Qty: 2 ML | Refills: 0 | Status: SHIPPED | OUTPATIENT
Start: 2023-09-11

## 2023-09-29 DIAGNOSIS — I10 ESSENTIAL (PRIMARY) HYPERTENSION: ICD-10-CM

## 2023-09-29 RX ORDER — POTASSIUM CHLORIDE 750 MG/1
TABLET, FILM COATED, EXTENDED RELEASE ORAL
Qty: 180 TABLET | Refills: 0 | Status: SHIPPED | OUTPATIENT
Start: 2023-09-29

## 2023-10-03 ENCOUNTER — LAB (OUTPATIENT)
Dept: LAB | Facility: HOSPITAL | Age: 68
End: 2023-10-03
Payer: MEDICARE

## 2023-10-03 ENCOUNTER — OFFICE VISIT (OUTPATIENT)
Dept: FAMILY MEDICINE CLINIC | Age: 68
End: 2023-10-03
Payer: MEDICARE

## 2023-10-03 VITALS
HEART RATE: 77 BPM | HEIGHT: 71 IN | WEIGHT: 251.2 LBS | TEMPERATURE: 97.7 F | DIASTOLIC BLOOD PRESSURE: 89 MMHG | SYSTOLIC BLOOD PRESSURE: 139 MMHG | BODY MASS INDEX: 35.17 KG/M2

## 2023-10-03 DIAGNOSIS — E78.2 MIXED HYPERLIPIDEMIA: ICD-10-CM

## 2023-10-03 DIAGNOSIS — E11.69 TYPE 2 DIABETES MELLITUS WITH HYPERLIPIDEMIA: ICD-10-CM

## 2023-10-03 DIAGNOSIS — I10 ESSENTIAL HYPERTENSION: Primary | ICD-10-CM

## 2023-10-03 DIAGNOSIS — Z23 IMMUNIZATION DUE: ICD-10-CM

## 2023-10-03 DIAGNOSIS — I10 ESSENTIAL HYPERTENSION: ICD-10-CM

## 2023-10-03 DIAGNOSIS — G47.00 INSOMNIA, UNSPECIFIED TYPE: ICD-10-CM

## 2023-10-03 DIAGNOSIS — E78.5 TYPE 2 DIABETES MELLITUS WITH HYPERLIPIDEMIA: ICD-10-CM

## 2023-10-03 LAB
ALBUMIN SERPL-MCNC: 4.4 G/DL (ref 3.5–5.2)
ALBUMIN UR-MCNC: <1.2 MG/DL
ALBUMIN/GLOB SERPL: 1.7 G/DL
ALP SERPL-CCNC: 68 U/L (ref 39–117)
ALT SERPL W P-5'-P-CCNC: 14 U/L (ref 1–41)
ANION GAP SERPL CALCULATED.3IONS-SCNC: 8.8 MMOL/L (ref 5–15)
AST SERPL-CCNC: 15 U/L (ref 1–40)
BILIRUB SERPL-MCNC: 0.6 MG/DL (ref 0–1.2)
BUN SERPL-MCNC: 9 MG/DL (ref 8–23)
BUN/CREAT SERPL: 9 (ref 7–25)
CALCIUM SPEC-SCNC: 9.6 MG/DL (ref 8.6–10.5)
CHLORIDE SERPL-SCNC: 104 MMOL/L (ref 98–107)
CHOLEST SERPL-MCNC: 147 MG/DL (ref 0–200)
CO2 SERPL-SCNC: 27.2 MMOL/L (ref 22–29)
CREAT SERPL-MCNC: 1 MG/DL (ref 0.76–1.27)
CREAT UR-MCNC: 47.3 MG/DL
EGFRCR SERPLBLD CKD-EPI 2021: 82 ML/MIN/1.73
GLOBULIN UR ELPH-MCNC: 2.6 GM/DL
GLUCOSE SERPL-MCNC: 172 MG/DL (ref 65–99)
HBA1C MFR BLD: 7.6 % (ref 4.8–5.6)
HDLC SERPL-MCNC: 53 MG/DL (ref 40–60)
LDLC SERPL CALC-MCNC: 72 MG/DL (ref 0–100)
LDLC/HDLC SERPL: 1.31 {RATIO}
MICROALBUMIN/CREAT UR: NORMAL MG/G{CREAT}
POTASSIUM SERPL-SCNC: 3.8 MMOL/L (ref 3.5–5.2)
PROT SERPL-MCNC: 7 G/DL (ref 6–8.5)
SODIUM SERPL-SCNC: 140 MMOL/L (ref 136–145)
TRIGL SERPL-MCNC: 122 MG/DL (ref 0–150)
VLDLC SERPL-MCNC: 22 MG/DL (ref 5–40)

## 2023-10-03 PROCEDURE — G0008 ADMIN INFLUENZA VIRUS VAC: HCPCS | Performed by: NURSE PRACTITIONER

## 2023-10-03 PROCEDURE — 80053 COMPREHEN METABOLIC PANEL: CPT

## 2023-10-03 PROCEDURE — 3052F HG A1C>EQUAL 8.0%<EQUAL 9.0%: CPT | Performed by: NURSE PRACTITIONER

## 2023-10-03 PROCEDURE — 80061 LIPID PANEL: CPT

## 2023-10-03 PROCEDURE — 82570 ASSAY OF URINE CREATININE: CPT

## 2023-10-03 PROCEDURE — 90662 IIV NO PRSV INCREASED AG IM: CPT | Performed by: NURSE PRACTITIONER

## 2023-10-03 PROCEDURE — 3075F SYST BP GE 130 - 139MM HG: CPT | Performed by: NURSE PRACTITIONER

## 2023-10-03 PROCEDURE — 82043 UR ALBUMIN QUANTITATIVE: CPT

## 2023-10-03 PROCEDURE — 3079F DIAST BP 80-89 MM HG: CPT | Performed by: NURSE PRACTITIONER

## 2023-10-03 PROCEDURE — 99214 OFFICE O/P EST MOD 30 MIN: CPT | Performed by: NURSE PRACTITIONER

## 2023-10-03 PROCEDURE — 83036 HEMOGLOBIN GLYCOSYLATED A1C: CPT

## 2023-10-03 PROCEDURE — 1159F MED LIST DOCD IN RCRD: CPT | Performed by: NURSE PRACTITIONER

## 2023-10-03 PROCEDURE — 36415 COLL VENOUS BLD VENIPUNCTURE: CPT

## 2023-10-03 NOTE — ASSESSMENT & PLAN NOTE
Continue current medication and efforts with diet and exercise.   Reviewed his weight, it is up, to work on diet, weight loss and regular exercise / he is fasting today for his labs

## 2023-10-03 NOTE — ASSESSMENT & PLAN NOTE
To work on diet, regular exercise, continue to monitor sugars, check feet daily, see optometrist yearly or as directed. To contact ANYA Osman re follow up, getting labs today

## 2023-10-03 NOTE — PROGRESS NOTES
Chief Complaint  Diabetes (Follow up diabetes and med refills. )    Subjective          Maksim Acosta presents to [facility]    History of Present Illness  Diabetes:  He was seeing ANYA Osman, appt's have been canceled and he is trying to reschedule but she is booked out until after the first of year    Current medication: trulicity, farxiga, metformin,   Tolerating medication: Yes  Last eye exam: tiffany   Last foot exam:  > 1 year   At home BS ranges: 140  Lab Results       Component                Value               Date                       HGBA1C                   8.60 (H)            01/10/2023           Hypertension:  Current medication: norvasc, clonidine, lisinopril HCTZ  Tolerating Medication: Yes  Checking BP at home and it is: 120's/90  Needs refills: No   Labs:  Lab Results       Component                Value               Date                       GLUCOSE                  143 (H)             04/18/2023                 BUN                      10                  04/18/2023                 CREATININE               1.01                04/18/2023                 EGFRIFAFRI               81                  09/27/2021                 BCR                      9.9                 04/18/2023                 K                        3.8                 04/18/2023                 CO2                      29.0                04/18/2023                 CALCIUM                  10.0                04/18/2023                 ALBUMIN                  4.3                 04/06/2023                 LABIL2                   1.4                 03/24/2021                 AST                      15                  04/06/2023                 ALT                      11                  04/06/2023              Hyperlipidemia  Current medication: lipitor   Tolerating medication: Yes  Needs Refill: No    Lab Results       Component                Value               Date                        CHOL                     97                  2022                 CHLPL                    122                 2021                 TRIG                     213 (H)             2022                 HDL                      33 (L)              2022                 LDL                      30                  2022              Takes trazodone and it helps.     Past Medical History changes since :        Hyperlipidemia    Hypertension     Sleep Apnea: (+) sleep study; uses CPAP;     Chronic Pain: affecting the low back;     Type 2 Diabetes: dx'd in ;     Glaucoma: dx'd in ;         PREVENTIVE HEALTH MAINTENANCE             COLORECTAL CANCER SCREENING: Up to date (colonoscopy q10y; sigmoidoscopy q5y; Cologuard q3y) was last done 19, Results are in chart; colonoscopy with the following abnormalities noted-- Diverticulosis; The next colonoscopy is due  ; 2014, pt. has consultation scheduled in May       Hepatitis C Medicare Screening: was last done 17; negative             CURRENT MEDICAL PROVIDERS:    Dentist: Dr. Cabrera    Neurologist: Dr Yap     Ophthalmologist: Johnny     Pulmonologist: Dr. Fischer - sleep                   Surgical History:       Right shoulder surgery 2021 R rotator cuff repair  Duber     Procedures:    Colonoscopy         Family History:     Father:  at age 83;  Dementia;  Type 2 Diabetes     Mother:  at age 51; Cause of death was CVA     Brother(s): 3 brother(s) total; 2 ;  Myocardial Infarction (  71 );  cancer of pharynx     Sister(s): 2 sister(s) total; 2 ;   at birth     Daughter(s): 2 daughter(s) total         Social History:     Occupation: Disabled (due to low back pain)     Marital Status:      Children: 2 children                      Past Medical History:   Diagnosis Date    Acanthosis nigricans     Chronic pain     LOW BACK     Essential (primary) hypertension      Glaucoma     DX'D IN     H/O repair of right rotator cuff     3 months later a manipulation needed    Hyperlipidemia, unspecified     Hypokalemia     Insomnia, unspecified     Low back pain     Nicotine dependence, cigarettes, in remission     Obstructive sleep apnea (adult) (pediatric)     (+) SLEEP STUDY; USES CPAP    Other symptoms and signs involving cognitive functions and awareness     Pain in left leg     Pain in left thigh     Personal history of colonic polyps     Psoriasis, unspecified     Type 2 diabetes mellitus with hyperglycemia     DX'D IN     Vitamin D deficiency        No Known Allergies     Past Surgical History:   Procedure Laterality Date    COLONOSCOPY      ROTATOR CUFF REPAIR Right     SHOULDER ARTHROSCOPY Right 2021        Social History     Tobacco Use    Smoking status: Former     Packs/day: 0.50     Years: 15.00     Pack years: 7.50     Types: Cigarettes     Quit date:      Years since quittin.     Passive exposure: Never    Smokeless tobacco: Never   Substance Use Topics    Alcohol use: Never     Comment: NON-DRINKER       Family History   Problem Relation Age of Onset    Stroke Mother     Dementia Father     Diabetes type II Father     Heart attack Brother 71    Cancer Brother         PHARYNX        Health Maintenance Due   Topic Date Due    ANNUAL WELLNESS VISIT  2022    URINE MICROALBUMIN  2022    LIPID PANEL  2023    HEMOGLOBIN A1C  07/10/2023    COVID-19 Vaccine (10 - 2023-24 season) 2023        Current Outpatient Medications on File Prior to Visit   Medication Sig    Accu-Chek Softclix Lancets lancets USE TO CHECK BLOOD SUGAR ONE TO TWO TIMES DAILY    amLODIPine (NORVASC) 10 MG tablet Take 1 tablet by mouth once daily    atorvastatin (LIPITOR) 20 MG tablet Take 1 tablet by mouth once daily    cloNIDine (CATAPRES) 0.2 MG tablet Take 0.5 tablets by mouth Every 12 (Twelve) Hours.    Combigan 0.2-0.5 % ophthalmic solution instill 1 drop  into each eye twice daily    dapagliflozin Propanediol (Farxiga) 10 MG tablet Take 10 mg by mouth Daily for 180 days.    gabapentin (Neurontin) 100 MG capsule Take 1 capsule by mouth 3 (Three) Times a Day.    glucose blood (Accu-Chek Marisela Plus) test strip USE STRIP TO CHECK GLUCOSE ONCE OR TWICE DAILY    lisinopril-hydrochlorothiazide (PRINZIDE,ZESTORETIC) 20-12.5 MG per tablet Take 1 tablet by mouth twice daily    metFORMIN (GLUCOPHAGE) 1000 MG tablet TAKE 1 TABLET BY MOUTH TWICE DAILY NEEDS  APPOINTMENT    potassium chloride 10 MEQ CR tablet Take 1 tablet by mouth twice daily    traZODone (DESYREL) 100 MG tablet TAKE 1 TABLET BY MOUTH ONCE DAILY AT BEDTIME    Trulicity 4.5 MG/0.5ML solution pen-injector INJECT 0.5ML UNDER THE SKIN ONCE A WEEK    [DISCONTINUED] aspirin (EQ Aspirin Adult Low Dose) 81 MG EC tablet Take 1 tablet by mouth Daily. (Patient not taking: Reported on 7/11/2023)     No current facility-administered medications on file prior to visit.       Immunization History   Administered Date(s) Administered    COVID-19 (MODERNA) Monovalent Original Booster 09/22/2022    COVID-19 (PFIZER) Purple Cap Monovalent 03/02/2021, 04/04/2021, 09/29/2021    COVID-19 (UNSPECIFIED) 02/22/2021, 03/02/2021, 03/15/2021, 04/04/2021    Covid-19 (Pfizer) Gray Cap Monovalent 05/02/2022    Flu Vaccine Intradermal Quad 18-64YR 09/10/2012, 08/29/2017    Fluzone High-Dose 65+yrs 09/29/2021, 09/21/2022, 10/03/2023    Hepatitis A 06/19/2018, 03/19/2019    Influenza Quad Vaccine (Inpatient) 10/13/2011, 09/10/2013, 08/27/2015, 09/01/2020    Influenza, Unspecified 09/30/2020    Pneumococcal Conjugate 20-Valent (PCV20) 09/21/2022    Pneumococcal Polysaccharide (PPSV23) 10/13/2011    Shingrix 03/19/2019, 08/09/2019    Tdap 03/19/2019    Zostavax 09/19/2015       Review of Systems   Constitutional:  Negative for fatigue and fever.   Respiratory:  Negative for cough and shortness of breath.    Cardiovascular:  Negative for chest pain,  "palpitations and leg swelling.   Musculoskeletal:  Positive for back pain (chronic, seeing specialist / neck).   Neurological:         Radiating right arm pain       Objective     Vitals:    10/03/23 0911 10/03/23 0934   BP: 138/91 139/89   BP Location: Left arm Left arm   Patient Position: Sitting Sitting   Cuff Size: Large Adult Large Adult   Pulse: 78 77   Temp: 97.7 °F (36.5 °C)    TempSrc: Oral    Weight: 114 kg (251 lb 3.2 oz)    Height: 180.3 cm (71\")             Physical Exam  Constitutional:       Appearance: Normal appearance.   Neck:      Vascular: No carotid bruit.   Cardiovascular:      Rate and Rhythm: Normal rate and regular rhythm.      Pulses:           Posterior tibial pulses are 2+ on the right side and 2+ on the left side.      Heart sounds: No murmur heard.  Pulmonary:      Effort: No respiratory distress.      Breath sounds: Normal breath sounds.   Musculoskeletal:         General: No swelling.   Feet:      Right foot:      Protective Sensation: 3 sites tested.  3 sites sensed.      Skin integrity: Skin integrity normal. No ulcer or blister.      Toenail Condition: Right toenails are normal.      Left foot:      Protective Sensation: 3 sites tested.  3 sites sensed.      Skin integrity: Skin integrity normal. No ulcer or blister.      Toenail Condition: Left toenails are normal.      Comments: Diabetic Foot Exam Performed and Monofilament Test Performed     Neurological:      Mental Status: He is alert.   Psychiatric:         Mood and Affect: Mood normal.         Thought Content: Thought content normal.       Result Review :     The following data was reviewed by: ERUM Charles on 10/03/2023:                       Assessment and Plan      Diagnoses and all orders for this visit:    1. Essential hypertension (Primary)  Assessment & Plan:  Hypertension is stable. Continue to monitor BP at home. Continue current meds. Continue to modify diet and lifestyle.     Orders:  -     " Comprehensive Metabolic Panel; Future    2. Type 2 diabetes mellitus with hyperlipidemia  Assessment & Plan:  To work on diet, regular exercise, continue to monitor sugars, check feet daily, see optometrist yearly or as directed. To contact ANYA Osman re follow up, getting labs today       Orders:  -     Comprehensive Metabolic Panel; Future  -     Lipid Panel; Future  -     Hemoglobin A1c; Future  -     Microalbumin / Creatinine Urine Ratio - Urine, Clean Catch; Future    3. Mixed hyperlipidemia  Assessment & Plan:  Continue current medication and efforts with diet and exercise.   Reviewed his weight, it is up, to work on diet, weight loss and regular exercise / he is fasting today for his labs     Orders:  -     Lipid Panel; Future    4. Insomnia, unspecified type  Assessment & Plan:  He will continue trazodone       5. Immunization due  Assessment & Plan:  Update with flu vaccine, to get covid vaccine where available     Orders:  -     Fluzone High-Dose 65+yrs ([id number])                    Follow Up     Return for followup pending lab results.    Patient was given instructions and counseling regarding his condition or for health maintenance advice. Please see specific information pulled into the AVS if appropriate.

## 2023-10-04 DIAGNOSIS — E11.42 DIABETIC POLYNEUROPATHY ASSOCIATED WITH TYPE 2 DIABETES MELLITUS: ICD-10-CM

## 2023-10-04 RX ORDER — GABAPENTIN 100 MG/1
CAPSULE ORAL
Qty: 90 CAPSULE | Refills: 0 | Status: SHIPPED | OUTPATIENT
Start: 2023-10-04

## 2023-10-06 DIAGNOSIS — E11.65 TYPE 2 DIABETES MELLITUS WITH HYPERGLYCEMIA, WITHOUT LONG-TERM CURRENT USE OF INSULIN: ICD-10-CM

## 2023-10-06 NOTE — TELEPHONE ENCOUNTER
Rx Refill Note  Requested Prescriptions     Pending Prescriptions Disp Refills    metFORMIN (GLUCOPHAGE) 1000 MG tablet [Pharmacy Med Name: metFORMIN HCl 1000 MG Oral Tablet] 60 tablet 0     Sig: TAKE 1 TABLET BY MOUTH TWICE DAILY NEEDS  APPOINTMENT      Last office visit with prescribing clinician: 10/3/2023   Last telemedicine visit with prescribing clinician: Visit date not found   Next office visit with prescribing clinician: 4/3/2024  Hemoglobin A1c (10/03/2023 09:45)     Fabiola Sousa LPN  10/06/23, 09:12 EDT

## 2023-10-09 ENCOUNTER — TELEPHONE (OUTPATIENT)
Dept: DIABETES SERVICES | Facility: HOSPITAL | Age: 68
End: 2023-10-09
Payer: MEDICARE

## 2023-10-09 RX ORDER — DULAGLUTIDE 4.5 MG/.5ML
4.5 INJECTION, SOLUTION SUBCUTANEOUS
Qty: 6 ML | Refills: 1 | Status: SHIPPED | OUTPATIENT
Start: 2023-10-09 | End: 2023-10-13 | Stop reason: SDUPTHER

## 2023-10-09 NOTE — TELEPHONE ENCOUNTER
OTHER MA SENT IN REFILL REQUEST FOR PATIENTS TRULICITY. PT HAS NOT BEEN SEEN SINCE 1-13-23 WITH NO FOLLOW UP APPT MADE. I CALLED AND VERBALLY CANCELLED THE ORDER 10-9-23. PATIENT MUST MAKE AN APPOINTMENT FOR FUTURE REFILLS.

## 2023-10-13 RX ORDER — DULAGLUTIDE 4.5 MG/.5ML
INJECTION, SOLUTION SUBCUTANEOUS
Qty: 4 ML | Refills: 0 | OUTPATIENT
Start: 2023-10-13

## 2023-10-13 RX ORDER — DULAGLUTIDE 4.5 MG/.5ML
4.5 INJECTION, SOLUTION SUBCUTANEOUS
Qty: 6 ML | Refills: 1 | Status: CANCELLED | OUTPATIENT
Start: 2023-10-13 | End: 2024-04-10

## 2023-10-13 RX ORDER — DULAGLUTIDE 4.5 MG/.5ML
4.5 INJECTION, SOLUTION SUBCUTANEOUS
Qty: 6 ML | Refills: 1 | Status: SHIPPED | OUTPATIENT
Start: 2023-10-13 | End: 2024-04-10

## 2023-10-13 NOTE — TELEPHONE ENCOUNTER
Patient called into office stating he needs refills of Trulicity, patient is not able to come to [place] for appointment, only able to go to [facility], patient scheduled appt for 1- which is next available appt, okay to send in refill now that follow up is scheduled?

## 2023-10-13 NOTE — TELEPHONE ENCOUNTER
Rx Refill Note  Requested Prescriptions     Pending Prescriptions Disp Refills    Dulaglutide (Trulicity) 4.5 MG/0.5ML solution pen-injector 6 mL 1     Sig: Inject 0.5 mL under the skin into the appropriate area as directed Every 7 (Seven) Days for 180 days.      Last office visit with prescribing clinician: 10/3/2023   Last telemedicine visit with prescribing clinician: Visit date not found   Next office visit with prescribing clinician: 4/3/2024  Hemoglobin A1c (10/03/2023 09:45)     ANYA Osman usually fills this medication but she she he hasn't been seen since 01/13/23 and he needs a f/u appt.  Next appt with her is 01/26/2024.  Her office sent in the refill and then called the pharmacy and cancelled it.       Fabiola Sousa LPN  10/13/23, 12:28 EDT

## 2023-10-20 DIAGNOSIS — E78.5 HYPERLIPIDEMIA, UNSPECIFIED HYPERLIPIDEMIA TYPE: ICD-10-CM

## 2023-10-20 RX ORDER — ATORVASTATIN CALCIUM 20 MG/1
TABLET, FILM COATED ORAL
Qty: 90 TABLET | Refills: 1 | Status: SHIPPED | OUTPATIENT
Start: 2023-10-20

## 2023-10-30 DIAGNOSIS — G47.00 INSOMNIA, UNSPECIFIED TYPE: ICD-10-CM

## 2023-10-31 RX ORDER — TRAZODONE HYDROCHLORIDE 100 MG/1
TABLET ORAL
Qty: 90 TABLET | Refills: 0 | Status: SHIPPED | OUTPATIENT
Start: 2023-10-31

## 2023-12-13 ENCOUNTER — TELEPHONE (OUTPATIENT)
Dept: FAMILY MEDICINE CLINIC | Age: 68
End: 2023-12-13
Payer: MEDICARE

## 2023-12-13 ENCOUNTER — TELEPHONE (OUTPATIENT)
Dept: FAMILY MEDICINE CLINIC | Age: 68
End: 2023-12-13

## 2023-12-13 NOTE — TELEPHONE ENCOUNTER
Provider: RUDY DELUNA     Caller: BENJAMIN BOLAND       Phone Number: [phone]     Reason for Call:     THE PATIENT CALLED TO GIVE THE MEDICATION THAT WAS GIVEN TO HIM AT THE HOSPITAL      DIAZEPAM   VALIUM  HYDROMORPHONE

## 2023-12-13 NOTE — TELEPHONE ENCOUNTER
Pt said he went to [facility] ER yesterday with pain in his neck, shoulders and arms. He said he can't use his hands to feed himself.  His right hand fingers are drawing up.   They did lab work, chest xray, EKG, MRI of cervical and thoracic spine and u/a.   He said they he has a nerve disorder and referred him to a neurosurgeon at [facility].  They gave him the office number and he called them this morning and they are working on getting him an appt.    He said the Gabapentin does not touch the pain.  He is wanting something for the pain until he can get into the neurosurgeon.  They gave him valium 5mg one tablet oral and dilaudid shot, per ER report.

## 2023-12-13 NOTE — TELEPHONE ENCOUNTER
Pt reported these are the medications [facility] gave him yesterday while he was there.  Did not send any pain meds home.  See other telephone note dated today.

## 2023-12-13 NOTE — TELEPHONE ENCOUNTER
I don't feel comfortable nor am I able to just call in pain rx's, see if he can be seen today and is he taking his gabapentin and OTC pain reliever's? He may need to go back to ER if symptoms worsen

## 2023-12-13 NOTE — TELEPHONE ENCOUNTER
No appts open today.  He is only taking the Gabapentin which he said does not help.   I can get him scheduled with acute care provider tomorrow.

## 2023-12-13 NOTE — TELEPHONE ENCOUNTER
Caller: Maksim Acosta    Relationship: Self    Best call back number:     [phone] (Mobile)       What medication are you requesting: PAIN MEDICATION    What are your current symptoms: PAIN IN ARMS    How long have you been experiencing symptoms: WAS SEEN YESTERDAY AT [facility]    Have you had these symptoms before:    [x] Yes  [] No    Have you been treated for these symptoms before:   [x] Yes  [] No    If a prescription is needed, what is your preferred pharmacy and phone number:    [facility] [address] [place] [address] [address] [facility] - [phone] [facility] [phone]  [phone]       Additional notes:PATIENT SAID HE IS OUT OF PAIN MEDICATION AND IS IN EXTREME PAIN (10 OUT OF 10). PATIENT IS REQUESTING A CALL BACK PLEASE

## 2023-12-13 NOTE — TELEPHONE ENCOUNTER
Reviewed ER note and MRI they did: Tamy, Spoke with Dr Wiggins with Neurosurgery at [facility], Dr Wiggins reviewed images and MRI report. Spoke to Dr Wiggins about symptoms. Dr Wiggins feels the pt is appropriate for Clinic, recommended to follow up with clinic and can be discharged home. Discussed POC with the pt, pt agreeable. Dr Wiggins recommending to take all medications as previously prescribed and call for an appointment.   He is on gabapentin, see if he has an appt with them now

## 2023-12-15 NOTE — TELEPHONE ENCOUNTER
Pt went to [facility] ER 12/14/23.    Note in ER records:   Spoke with Dr. Perdomo , neurosurgery at [facility], who recommended to transfer pt to [facility] ER for further evaluation. Dr. Lowery at the ED accepted pt for transfer. Patient and daughter agreeable with plan of care.

## 2023-12-16 NOTE — TELEPHONE ENCOUNTER
I will look through chart, but check on him Monday, they should have done this to begin with and not sent home.

## 2023-12-18 NOTE — TELEPHONE ENCOUNTER
Called his daughter April, she said he was transferred from [facility] on 12/14/23 to [facility] in [place] and he is still there currently.  She said they have been doing testing on him and are going to do a spinal tap too.  She siad they havent really said much about what's going on with him.  Advised her to let us know if she finds out anything about what's going on with him, she said she would.    PACT

## 2023-12-24 DIAGNOSIS — I10 ESSENTIAL (PRIMARY) HYPERTENSION: ICD-10-CM

## 2023-12-26 DIAGNOSIS — E11.42 DIABETIC POLYNEUROPATHY ASSOCIATED WITH TYPE 2 DIABETES MELLITUS: ICD-10-CM

## 2023-12-26 RX ORDER — LISINOPRIL AND HYDROCHLOROTHIAZIDE 20; 12.5 MG/1; MG/1
TABLET ORAL
Qty: 180 TABLET | Refills: 0 | Status: SHIPPED | OUTPATIENT
Start: 2023-12-26

## 2023-12-26 RX ORDER — CLONIDINE HYDROCHLORIDE 0.2 MG/1
0.1 TABLET ORAL EVERY 12 HOURS
Qty: 90 TABLET | Refills: 0 | Status: SHIPPED | OUTPATIENT
Start: 2023-12-26

## 2023-12-26 NOTE — TELEPHONE ENCOUNTER
Patient called into office stating that he was in hospital for 8 days and has not taken his Gabapentin in 6 days, patient is requesting a refill.

## 2024-01-03 RX ORDER — GABAPENTIN 100 MG/1
100 CAPSULE ORAL 3 TIMES DAILY
Qty: 90 CAPSULE | Refills: 0 | OUTPATIENT
Start: 2024-01-03

## 2024-01-03 NOTE — TELEPHONE ENCOUNTER
Patient has not been seen since January 2023.  Cannot refill controlled substance without appointment

## 2024-01-11 ENCOUNTER — LAB (OUTPATIENT)
Dept: LAB | Facility: HOSPITAL | Age: 69
End: 2024-01-11
Payer: MEDICARE

## 2024-01-11 ENCOUNTER — OFFICE VISIT (OUTPATIENT)
Dept: FAMILY MEDICINE CLINIC | Age: 69
End: 2024-01-11
Payer: MEDICARE

## 2024-01-11 VITALS
BODY MASS INDEX: 32.42 KG/M2 | WEIGHT: 231.6 LBS | HEIGHT: 71 IN | HEART RATE: 104 BPM | SYSTOLIC BLOOD PRESSURE: 103 MMHG | DIASTOLIC BLOOD PRESSURE: 71 MMHG | TEMPERATURE: 97.4 F

## 2024-01-11 DIAGNOSIS — M54.2 NECK PAIN: ICD-10-CM

## 2024-01-11 DIAGNOSIS — G37.3 TRANSVERSE MYELITIS: ICD-10-CM

## 2024-01-11 DIAGNOSIS — I26.99 PULMONARY EMBOLISM, UNSPECIFIED CHRONICITY, UNSPECIFIED PULMONARY EMBOLISM TYPE, UNSPECIFIED WHETHER ACUTE COR PULMONALE PRESENT: ICD-10-CM

## 2024-01-11 DIAGNOSIS — M48.02 CERVICAL STENOSIS OF SPINE: ICD-10-CM

## 2024-01-11 DIAGNOSIS — I10 ESSENTIAL HYPERTENSION: ICD-10-CM

## 2024-01-11 DIAGNOSIS — I10 ESSENTIAL HYPERTENSION: Primary | ICD-10-CM

## 2024-01-11 LAB
ANION GAP SERPL CALCULATED.3IONS-SCNC: 16.5 MMOL/L (ref 5–15)
BUN SERPL-MCNC: 15 MG/DL (ref 8–23)
BUN/CREAT SERPL: 14.3 (ref 7–25)
CALCIUM SPEC-SCNC: 10.2 MG/DL (ref 8.6–10.5)
CHLORIDE SERPL-SCNC: 100 MMOL/L (ref 98–107)
CO2 SERPL-SCNC: 25.5 MMOL/L (ref 22–29)
CREAT SERPL-MCNC: 1.05 MG/DL (ref 0.76–1.27)
EGFRCR SERPLBLD CKD-EPI 2021: 77.3 ML/MIN/1.73
GLUCOSE SERPL-MCNC: 138 MG/DL (ref 65–99)
POTASSIUM SERPL-SCNC: 3.3 MMOL/L (ref 3.5–5.2)
SODIUM SERPL-SCNC: 142 MMOL/L (ref 136–145)

## 2024-01-11 PROCEDURE — 36415 COLL VENOUS BLD VENIPUNCTURE: CPT

## 2024-01-11 PROCEDURE — 80048 BASIC METABOLIC PNL TOTAL CA: CPT

## 2024-01-11 RX ORDER — NAPROXEN SODIUM 220 MG
220 TABLET ORAL
COMMUNITY
Start: 2023-11-09

## 2024-01-11 RX ORDER — APIXABAN 5 MG/1
1 TABLET, FILM COATED ORAL EVERY 12 HOURS SCHEDULED
COMMUNITY
Start: 2023-12-21

## 2024-01-11 RX ORDER — PANTOPRAZOLE SODIUM 40 MG/1
1 TABLET, DELAYED RELEASE ORAL DAILY
COMMUNITY
Start: 2023-12-21

## 2024-01-11 RX ORDER — AMLODIPINE BESYLATE AND ATORVASTATIN CALCIUM 10; 10 MG/1; MG/1
1 TABLET, FILM COATED ORAL DAILY
COMMUNITY
End: 2024-01-14 | Stop reason: SDUPTHER

## 2024-01-11 RX ORDER — DICLOFENAC SODIUM 1 MG/ML
1 SOLUTION/ DROPS OPHTHALMIC 4 TIMES DAILY
COMMUNITY

## 2024-01-11 NOTE — PROGRESS NOTES
Chief Complaint  Hospital Follow Up Visit ([facility] discharged 12/21/2023 for lost use of both arms and legs. )    Subjective          Maksim Acosta presents to [facility]    History of Present Illness  JON appt  Dg transverse myelitis, inflammatory myelopathy  PE, HTN, T2DM  Treated steroids, tapered off and Eliquis  Was seen at [facility] and then transferred to   Admitted at [facility] 12-14-23  Discharged: 12-21-23  Condition: Improved, Left hand side, improved more than R, walked in here on his own today.  NO neck pain.    He has been getting PT/OT twice a week  MRI follow up 1-24-24  Seeing Dr Kim at [facility] ER records:  68-year-old male with history of hypertension, hyperlipidemia, type 2 diabetes presents today complaining of worsening neurological symptoms in bilateral hands since Saturday. Patient was seen here on Tuesday (2 days ago) and MRI of thoracic and cervical spine showed abnormal uptake which could indicate cord infarction, contusion, demyelination. Neurosurgery was consulted. Patient was recommended for outpatient follow-up. Patient states that he has not had an appointment yet. Daughter states patient was unable to dress himself anymore as of this morning.  Given dilaudid while in ER  Plan/Evaluation: Discussed with neurosurgery available transfer for worsening symptoms or further evaluation and management.    12-12-23   MR thoracic spine without contrast  Order: [id number]  Impression    Abnormal high T2 signal centrally within the mid cervical spinal cord  probably explains the patient's symptoms. There is degenerative  spondylosis in the vicinity of the signal abnormality but not to the  degree that I would expect to cause significant myelomalacia. Underlying  cord infarct, cord contusion, demyelination or conceivably a neoplasm  should be included in the differential diagnosis. Further evaluation  with a contrast enhanced MRI of the  cervical spine to include  diffusion-weighted sequences is recommended.     MR spine cervical without IV contrast (prior one ordered by Dr Yap 4-2023)   Order: [id number]  Impression    Abnormal high T2 signal centrally within the mid cervical spinal cord  probably explains the patient's symptoms. There is degenerative  spondylosis in the vicinity of the signal abnormality but not to the  degree that I would expect to cause significant myelomalacia. Underlying  cord infarct, cord contusion, demyelination or conceivably a neoplasm  should be included in the differential diagnosis. Further evaluation  with a contrast enhanced MRI of the cervical spine to include  diffusion-weighted sequences is recommended.      Diabetes:  Current medication: trulicity, farxiga and metformin   Has an upcoming appt with ANYA Osman   At home BS ranges: not been checking but will start checking  Lab Results       Component                Value               Date                       HGBA1C                   7.60 (H)            10/03/2023                  Past Medical History changes since :      Hospitalizations:     : transverse myelitis      Hyperlipidemia    Hypertension     Sleep Apnea: (+) sleep study; uses CPAP;     Chronic Pain: affecting the low back;     Type 2 Diabetes: dx'd in 2010;     Glaucoma: dx'd in 2009;         PREVENTIVE HEALTH MAINTENANCE             COLORECTAL CANCER SCREENING: Up to date (colonoscopy q10y; sigmoidoscopy q5y; Cologuard q3y) was last done 7-29-19, Results are in chart; colonoscopy with the following abnormalities noted-- Diverticulosis; The next colonoscopy is due  2024; 03-, pt. has consultation scheduled in May       Hepatitis C Medicare Screening: was last done 5-16-17; negative             CURRENT MEDICAL PROVIDERS:    Dentist: Dr. Cabrera    Neurologist:  was seeing Dr Yap     Ophthalmologist: Johnny     Pulmonologist: Dr. Fischer - sleep                   Surgical  History:       Right shoulder surgery 2021 R rotator cuff repair  Duber     Procedures:    Colonoscopy         Family History:     Father:  at age 83;  Dementia;  Type 2 Diabetes     Mother:  at age 51; Cause of death was CVA     Brother(s): 3 brother(s) total; 2 ;  Myocardial Infarction (  71 );  cancer of pharynx     Sister(s): 2 sister(s) total; 2 ;   at birth     Daughter(s): 2 daughter(s) total         Social History:     Occupation: Disabled (due to low back pain)     Marital Status:      Children: 2 children           Past Medical History:   Diagnosis Date    Acanthosis nigricans     Chronic pain     LOW BACK     Essential (primary) hypertension     Glaucoma     DX'D IN     H/O repair of right rotator cuff     3 months later a manipulation needed    Hyperlipidemia, unspecified     Hypokalemia     Insomnia, unspecified     Low back pain     Nicotine dependence, cigarettes, in remission     Obstructive sleep apnea (adult) (pediatric)     (+) SLEEP STUDY; USES CPAP    Other symptoms and signs involving cognitive functions and awareness     Pain in left leg     Pain in left thigh     Personal history of colonic polyps     Psoriasis, unspecified     Type 2 diabetes mellitus with hyperglycemia     DX'D IN     Vitamin D deficiency        No Known Allergies     Past Surgical History:   Procedure Laterality Date    COLONOSCOPY      ROTATOR CUFF REPAIR Right     SHOULDER ARTHROSCOPY Right 2021        Social History     Tobacco Use    Smoking status: Former     Packs/day: 0.50     Years: 15.00     Additional pack years: 0.00     Total pack years: 7.50     Types: Cigarettes     Quit date:      Years since quittin.0     Passive exposure: Never    Smokeless tobacco: Never   Substance Use Topics    Alcohol use: Never     Comment: NON-DRINKER       Family History   Problem Relation Age of Onset    Stroke Mother     Dementia Father     Diabetes type II Father      Heart attack Brother 71    Cancer Brother         PHARYNX        Health Maintenance Due   Topic Date Due    ANNUAL WELLNESS VISIT  09/27/2022    BMI FOLLOWUP  10/28/2022        Current Outpatient Medications on File Prior to Visit   Medication Sig    Accu-Chek Softclix Lancets lancets USE TO CHECK BLOOD SUGAR ONE TO TWO TIMES DAILY    amLODIPine-atorvastatin (CADUET) 10-10 MG per tablet Take 1 tablet by mouth Daily.    atorvastatin (LIPITOR) 20 MG tablet Take 1 tablet by mouth once daily    cloNIDine (CATAPRES) 0.2 MG tablet TAKE 1/2 (ONE-HALF) TABLET BY MOUTH EVERY 12 HOURS    Combigan 0.2-0.5 % ophthalmic solution instill 1 drop into each eye twice daily    dapagliflozin Propanediol (Farxiga) 10 MG tablet Take 10 mg by mouth Daily for 180 days.    diclofenac (VOLTAREN) 0.1 % ophthalmic solution 1 drop 4 (Four) Times a Day.    Dulaglutide (Trulicity) 4.5 MG/0.5ML solution pen-injector Inject 0.5 mL under the skin into the appropriate area as directed Every 7 (Seven) Days for 180 days.    Eliquis 5 MG tablet tablet Take 1 tablet by mouth Every 12 (Twelve) Hours.    gabapentin (NEURONTIN) 100 MG capsule TAKE 1 CAPSULE BY MOUTH THREE TIMES DAILY    glucose blood (Accu-Chek Marisela Plus) test strip USE STRIP TO CHECK GLUCOSE ONCE OR TWICE DAILY    lisinopril-hydrochlorothiazide (PRINZIDE,ZESTORETIC) 20-12.5 MG per tablet Take 1 tablet by mouth twice daily    metFORMIN (GLUCOPHAGE) 1000 MG tablet Take 1 tablet by mouth 2 (Two) Times a Day With Meals.    naproxen sodium (ALEVE) 220 MG tablet Take 1 tablet by mouth.    pantoprazole (PROTONIX) 40 MG EC tablet Take 1 tablet by mouth Daily.    potassium chloride 10 MEQ CR tablet Take 1 tablet by mouth twice daily    traZODone (DESYREL) 100 MG tablet TAKE 1 TABLET BY MOUTH ONCE DAILY AT BEDTIME     No current facility-administered medications on file prior to visit.       Immunization History   Administered Date(s) Administered    COVID-19 (MODERNA) Monovalent Original  "Booster 09/22/2022    COVID-19 (PFIZER) Purple Cap Monovalent 03/02/2021, 04/04/2021, 09/29/2021    COVID-19 (UNSPECIFIED) 02/22/2021, 03/02/2021, 03/15/2021, 04/04/2021    COVID-19 F23 (MODERNA) 12YRS+ (SPIKEVAX) 10/03/2023    Covid-19 (Pfizer) Gray Cap Monovalent 05/02/2022    Flu Vaccine Intradermal Quad 18-64YR 09/10/2012, 08/29/2017    Fluzone High-Dose 65+yrs 09/29/2021, 09/21/2022, 10/03/2023    Hepatitis A 06/19/2018, 03/19/2019    Influenza Quad Vaccine (Inpatient) 10/13/2011, 09/10/2013, 08/27/2015, 09/01/2020    Influenza, Unspecified 09/30/2020    Pneumococcal Conjugate 20-Valent (PCV20) 09/21/2022    Pneumococcal Polysaccharide (PPSV23) 10/13/2011    Shingrix 03/19/2019, 08/09/2019    Tdap 03/19/2019    Zostavax 09/19/2015       Review of Systems   Constitutional:  Positive for fatigue. Negative for fever.   Respiratory:  Negative for cough and shortness of breath.    Cardiovascular:  Negative for chest pain, palpitations and leg swelling.   Neurological:  Positive for weakness (R>L, but improved).        Objective     Vitals:    01/11/24 1256 01/11/24 1320   BP: 100/83 103/71   BP Location: Right arm Right arm   Patient Position: Sitting Sitting   Cuff Size: Large Adult Large Adult   Pulse: 112 104   Temp: 97.4 °F (36.3 °C)    TempSrc: Oral    Weight: 105 kg (231 lb 9.6 oz)    Height: 180.3 cm (71\")             Physical Exam  Vitals reviewed.   Constitutional:       General: He is not in acute distress.     Appearance: Normal appearance.   Neck:      Vascular: No carotid bruit.   Cardiovascular:      Rate and Rhythm: Normal rate and regular rhythm.      Heart sounds: Normal heart sounds. No murmur heard.  Pulmonary:      Effort: Pulmonary effort is normal. No respiratory distress.      Breath sounds: Normal breath sounds.   Musculoskeletal:      Right lower leg: No edema.      Left lower leg: No edema.   Neurological:      Mental Status: He is alert.   Psychiatric:         Mood and Affect: Mood normal. "         Behavior: Behavior normal.         Result Review :     The following data was reviewed by: ERUM Charles on 01/11/2024:                       Assessment and Plan      Diagnoses and all orders for this visit:    1. Essential hypertension (Primary)  Assessment & Plan:  He is on K, clonidine, norvasc, Lisinopril HCTZ , rechecking K today, repeated BP and pulse, reviewed labs done at [facility]     Orders:  -     Basic metabolic panel; Future    2. Transverse myelitis  Assessment & Plan:  Reviewed [facility] records, nothing available from [facility], reviewed the discharge paper work patient had with him, send for records, keep his upcoming appt's and doing his PT/OT      3. Neck pain  Assessment & Plan:  He is having no neck pain today, therapist is working with him and did his PT today.        4. Cervical stenosis of spine  Assessment & Plan:  Reviewed MRI of c spine       5. Pulmonary embolism, unspecified chronicity, unspecified pulmonary embolism type, unspecified whether acute cor pulmonale present  Assessment & Plan:  Sending for [facility] records, CT, continue eliquis as directed                  Follow Up     Return for followup pending lab results.    Patient was given instructions and counseling regarding his condition or for health maintenance advice. Please see specific information pulled into the AVS if appropriate.

## 2024-01-11 NOTE — ASSESSMENT & PLAN NOTE
Reviewed [facility] records, nothing available from [facility], reviewed the discharge paper work patient had with him, send for records, keep his upcoming appt's and doing his PT/OT

## 2024-01-11 NOTE — ASSESSMENT & PLAN NOTE
He is on K, clonidine, norvasc, Lisinopril HCTZ , rechecking K today, repeated BP and pulse, reviewed labs done at [facility]

## 2024-01-12 DIAGNOSIS — E87.6 LOW SERUM POTASSIUM: Primary | ICD-10-CM

## 2024-01-12 DIAGNOSIS — I10 ESSENTIAL (PRIMARY) HYPERTENSION: ICD-10-CM

## 2024-01-12 NOTE — TELEPHONE ENCOUNTER
He is on caduet, according to medicine record, so he would not need refill of norvasc, he is the one I saw for a JON, and no records from [facility], so I need.  This rx is in my Rx request / I reviewed discharge summary, he should be on norvasc 10 /fix rx list

## 2024-01-12 NOTE — TELEPHONE ENCOUNTER
Rx Refill Note  Requested Prescriptions     Pending Prescriptions Disp Refills    amLODIPine (NORVASC) 10 MG tablet [Pharmacy Med Name: amLODIPine Besylate 10 MG Oral Tablet] 90 tablet 0     Sig: Take 1 tablet by mouth once daily      Last office visit with prescribing clinician: 1/11/2024   Last telemedicine visit with prescribing clinician: Visit date not found   Next office visit with prescribing clinician: 4/3/2024    The original prescription was discontinued on 8/25/2023 by Yolanda Marie APRN. Renewing this prescription may not be appropriate.         Fabiola Sousa LPN  01/12/24, 09:20 EST

## 2024-01-12 NOTE — TELEPHONE ENCOUNTER
Pt said he has been taking the plain amlodipine 10mg tablets at home.  He doesn't know anything about that caduet combo pill.  He said he is just needing the amlodipine plain refilled.

## 2024-01-13 NOTE — TELEPHONE ENCOUNTER
He certainly does not need both doses of amlodipine, check with his pharmacy and see what is being filled there

## 2024-01-14 RX ORDER — AMLODIPINE BESYLATE 10 MG/1
TABLET ORAL
Qty: 90 TABLET | Refills: 0 | Status: SHIPPED | OUTPATIENT
Start: 2024-01-14

## 2024-01-15 NOTE — TELEPHONE ENCOUNTER
Pharmacist at [facility] said,  Amlodipine 10mg was filled on 01/14/24, #90, 08/25/2023, #90.  Not too regular on that one. She has not record of his ever getting the caduet.  Clonidine 0.2mg, 1/2 tablet bid last filled on 12/26/23, #90 and 08/20/23, #90.  Not too regular on that one either.  Lisinopril/ hcz 20/12.5mg bid, last filled on 12/26/23, #90 and 08/08/23, #90.  Not regular on this one too.

## 2024-01-23 DIAGNOSIS — I10 ESSENTIAL (PRIMARY) HYPERTENSION: ICD-10-CM

## 2024-01-23 NOTE — TELEPHONE ENCOUNTER
Rx Refill Note  Requested Prescriptions     Pending Prescriptions Disp Refills    potassium chloride 10 MEQ CR tablet [Pharmacy Med Name: Potassium Chloride ER 10 MEQ Oral Tablet Extended Release] 180 tablet 0     Sig: Take 1 tablet by mouth twice daily      Last office visit with prescribing clinician: 1/11/2024   Last telemedicine visit with prescribing clinician: Visit date not found   Next office visit with prescribing clinician: 4/3/2024  Basic metabolic panel (01/11/2024 13:27)   Potassium Supplement Protocol Failed     Fabiola Sousa LPN  01/23/24, 11:06 EST

## 2024-01-24 RX ORDER — POTASSIUM CHLORIDE 750 MG/1
TABLET, FILM COATED, EXTENDED RELEASE ORAL
Qty: 180 TABLET | Refills: 0 | Status: SHIPPED | OUTPATIENT
Start: 2024-01-24

## 2024-01-25 NOTE — PROGRESS NOTES
Chief Complaint  Diabetes (Follow up, med mgt, A1c eval )    Referred By: No ref. provider found    Subjective          Maksim Acosta presents to [facility] for diabetes medication management    History of Present Illness    Visit type:  follow-up  Diabetes type:  Type 2  Current diabetes status/concerns/issues:  He was last seen in January 2023. His sugar has been up and down some  Other health concerns:  He was admitted to the hospital in December due to significant pain with numbness and tingling in the upper extremities.  He was transferred to the [facility] where he was diagnosed with transverse myelitis and inflammatory myelopathy.  He was treated with steroids and is currently undergoing physical therapy for ongoing treatment.  Current Diabetes symptoms:    Polyuria: No   Polydipsia: No   Polyphagia: No   Blurred vision: No   Excessive fatigue: No  Known Diabetes complications:  Neuro: None  Renal: Stage II renal disease  Eyes: None  Amputation/Wounds: None  GI: None  Cardiovascular: Hypertension and hyperlipidemia  ED: Per patient              Other: None  Hypoglycemia:  None reported at this time  Hypoglycemia Symptoms:  No hypoglycemia at this time  Current diabetes treatment:  metformin 1000 mg twice a day and Trulicity 4.5 mg once weekly, Farxiga 10 mg once a day   Blood glucose device:  Meter  Blood glucose monitoring frequency:  Random/occasional  Blood glucose range/average:   140-160  Glucose Source: BG Logs  Diet:  Limits high carb/sweet foods, Avoids sugary drinks  Activity/Exercise:   he has been doing physical therapy    Past Medical History:   Diagnosis Date    Acanthosis nigricans     Chronic pain     LOW BACK     Essential (primary) hypertension     Glaucoma     DX'D IN 2009    H/O repair of right rotator cuff     3 months later a manipulation needed    Hyperlipidemia, unspecified     Hypokalemia     Insomnia, unspecified     Low back  pain     Nicotine dependence, cigarettes, in remission     Obstructive sleep apnea (adult) (pediatric)     (+) SLEEP STUDY; USES CPAP    Other symptoms and signs involving cognitive functions and awareness     Pain in left leg     Pain in left thigh     Personal history of colonic polyps     Psoriasis, unspecified     Type 2 diabetes mellitus with hyperglycemia     DX'D IN     Vitamin D deficiency      Past Surgical History:   Procedure Laterality Date    COLONOSCOPY      ROTATOR CUFF REPAIR Right     SHOULDER ARTHROSCOPY Right 2021     Family History   Problem Relation Age of Onset    Stroke Mother     Dementia Father     Diabetes type II Father     Heart attack Brother 71    Cancer Brother         PHARYNX     Social History     Socioeconomic History    Marital status:     Number of children: 2   Tobacco Use    Smoking status: Former     Packs/day: 0.50     Years: 15.00     Additional pack years: 0.00     Total pack years: 7.50     Types: Cigarettes     Quit date:      Years since quittin.0     Passive exposure: Never    Smokeless tobacco: Never   Vaping Use    Vaping Use: Never used   Substance and Sexual Activity    Alcohol use: Never     Comment: NON-DRINKER    Drug use: Not Currently     Types: Marijuana     Comment: stopped    Sexual activity: Defer     No Known Allergies    Current Outpatient Medications:     Accu-Chek Softclix Lancets lancets, USE TO CHECK BLOOD SUGAR ONE TO TWO TIMES DAILY, Disp: 100 each, Rfl: 0    acetaminophen (TYLENOL) 500 MG tablet, Take 1 tablet by mouth Every 6 (Six) Hours As Needed for Mild Pain., Disp: , Rfl:     amLODIPine (NORVASC) 10 MG tablet, Take 1 tablet by mouth once daily, Disp: 90 tablet, Rfl: 0    atorvastatin (LIPITOR) 20 MG tablet, Take 1 tablet by mouth once daily, Disp: 90 tablet, Rfl: 1    cloNIDine (CATAPRES) 0.2 MG tablet, TAKE 1/2 (ONE-HALF) TABLET BY MOUTH EVERY 12 HOURS, Disp: 90 tablet, Rfl: 0    Combigan 0.2-0.5 % ophthalmic  "solution, instill 1 drop into each eye twice daily, Disp: , Rfl:     dapagliflozin Propanediol (Farxiga) 10 MG tablet, Take 10 mg by mouth Daily for 180 days., Disp: 90 tablet, Rfl: 1    diclofenac (VOLTAREN) 0.1 % ophthalmic solution, 1 drop 4 (Four) Times a Day., Disp: , Rfl:     Dulaglutide (Trulicity) 4.5 MG/0.5ML solution pen-injector, Inject 0.5 mL under the skin into the appropriate area as directed Every 7 (Seven) Days for 180 days., Disp: 6 mL, Rfl: 1    Eliquis 5 MG tablet tablet, Take 1 tablet by mouth Every 12 (Twelve) Hours., Disp: , Rfl:     gabapentin (NEURONTIN) 100 MG capsule, Take 1 capsule by mouth 3 (Three) Times a Day., Disp: 90 capsule, Rfl: 1    glucose blood (Accu-Chek Marisela Plus) test strip, USE STRIP TO CHECK GLUCOSE ONCE OR TWICE DAILY, Disp: 100 each, Rfl: 0    lisinopril-hydrochlorothiazide (PRINZIDE,ZESTORETIC) 20-12.5 MG per tablet, Take 1 tablet by mouth twice daily, Disp: 180 tablet, Rfl: 0    metFORMIN (GLUCOPHAGE) 1000 MG tablet, Take 1 tablet by mouth 2 (Two) Times a Day With Meals., Disp: 180 tablet, Rfl: 1    pantoprazole (PROTONIX) 40 MG EC tablet, Take 1 tablet by mouth Daily., Disp: , Rfl:     potassium chloride 10 MEQ CR tablet, Take 1 tablet by mouth twice daily, Disp: 180 tablet, Rfl: 0    traZODone (DESYREL) 100 MG tablet, TAKE 1 TABLET BY MOUTH ONCE DAILY AT BEDTIME, Disp: 90 tablet, Rfl: 0    Objective     Vitals:    01/26/24 0909   BP: 126/83   BP Location: Right arm   Patient Position: Sitting   Cuff Size: Adult   Pulse: 104   SpO2: 98%   Weight: 105 kg (231 lb)   Height: 180.3 cm (71\")   PainSc:   5     Body mass index is 32.22 kg/m².    Physical Exam  Constitutional:       Appearance: Normal appearance. He is obese.      Comments: Obesity (BMI 30 - 39.9) Pt Current BMI = 32.22    HENT:      Head: Normocephalic and atraumatic.      Right Ear: External ear normal.      Left Ear: External ear normal.      Nose: Nose normal.   Eyes:      Extraocular Movements: " Extraocular movements intact.      Conjunctiva/sclera: Conjunctivae normal.   Pulmonary:      Effort: Pulmonary effort is normal.   Musculoskeletal:         General: Normal range of motion.      Cervical back: Normal range of motion.   Skin:     General: Skin is warm and dry.   Neurological:      General: No focal deficit present.      Mental Status: He is alert and oriented to person, place, and time. Mental status is at baseline.   Psychiatric:         Mood and Affect: Mood normal.         Behavior: Behavior normal.         Thought Content: Thought content normal.         Judgment: Judgment normal.             Result Review :   The following data was reviewed by: ERUM Davila on 01/26/2024:    Most Recent A1C          1/26/2024    09:13   HGBA1C Most Recent   Hemoglobin A1C 7.7        A1C Last 3 Results          10/3/2023    09:45 1/26/2024    09:13   HGBA1C Last 3 Results   Hemoglobin A1C 7.60  7.7      A1c collected in the office today is 7.7%, indicating Uncontrolled Type II diabetes.  This result is up from the prior result of 7.6% collected on 10/3/23       Creatinine   Date Value Ref Range Status   01/11/2024 1.05 0.76 - 1.27 mg/dL Final   10/03/2023 1.00 0.76 - 1.27 mg/dL Final     eGFR   Date Value Ref Range Status   01/11/2024 77.3 >60.0 mL/min/1.73 Final   10/03/2023 82.0 >60.0 mL/min/1.73 Final     Labs collected on 1/11/24 show Stage II mild (GFR = 60-89mL/min)    Microalbumin, Urine   Date Value Ref Range Status   10/03/2023 <1.2 mg/dL Final   09/27/2021 <1.2 mg/dL Final     Creatinine, Urine   Date Value Ref Range Status   10/03/2023 47.3 mg/dL Final   09/27/2021 111.2 mg/dL Final     Microalbumin/Creatinine Ratio   Date Value Ref Range Status   10/03/2023   Final     Comment:     Unable to calculate   09/27/2021   Final     Comment:     Unable to calculate     Urine microalbuminuria collected on 10/3/23 is negative for microalbuminuria    Total Cholesterol   Date Value Ref Range Status    10/03/2023 147 0 - 200 mg/dL Final   05/11/2022 97 0 - 200 mg/dL Final     Triglycerides   Date Value Ref Range Status   10/03/2023 122 0 - 150 mg/dL Final   05/11/2022 213 (H) 0 - 150 mg/dL Final     HDL Cholesterol   Date Value Ref Range Status   10/03/2023 53 40 - 60 mg/dL Final   05/11/2022 33 (L) 40 - 60 mg/dL Final     LDL Cholesterol    Date Value Ref Range Status   10/03/2023 72 0 - 100 mg/dL Final   05/11/2022 30 0 - 100 mg/dL Final     Lipid panel collected on 10/3/23 shows normal lipid panel            Assessment: He has had a slight increase in his A1c since the prior evaluation.  Patient has had significant health issues over the last few months and received some steroid treatments.  This is most likely a source of high glucose levels.  Glucose levels seem to have stabilized most recently.      Diagnoses and all orders for this visit:    1. Uncontrolled type 2 diabetes mellitus with hyperglycemia (Primary)  -     POC Glycosylated Hemoglobin (Hb A1C)  -     dapagliflozin Propanediol (Farxiga) 10 MG tablet; Take 10 mg by mouth Daily for 180 days.  Dispense: 90 tablet; Refill: 1    2. Diabetic polyneuropathy associated with type 2 diabetes mellitus  -     gabapentin (NEURONTIN) 100 MG capsule; Take 1 capsule by mouth 3 (Three) Times a Day.  Dispense: 90 capsule; Refill: 1    3. Type 2 diabetes mellitus with stage 2 chronic kidney disease, without long-term current use of insulin        Plan: At this time we elected to make no changes to his current treatment plan.  Patient will monitor his dietary behavior to him control glucose levels.  He is encouraged to increase his activity as tolerated as well.  We will consider medication adjustments at the next appointment if necessary.    The patient will monitor his blood glucose levels once a day.  If he develops problematic hyperglycemia or hypoglycemia or adverse drug reactions, he will contact the office for further instructions.        Follow Up     Return  in about 3 months (around 4/26/2024) for Medication Management.    Patient was given instructions and counseling regarding his condition or for health maintenance advice. Please see specific information pulled into the AVS if appropriate.     Debra Osman, ERUM  01/26/2024      Dictated Utilizing Dragon Dictation.  Please note that portions of this note were completed with a voice recognition program.  Part of this note may be an electronic transcription/translation of spoken language to printed text using the Dragon Dictation System.

## 2024-01-26 ENCOUNTER — OFFICE VISIT (OUTPATIENT)
Dept: DIABETES SERVICES | Facility: CLINIC | Age: 69
End: 2024-01-26
Payer: MEDICARE

## 2024-01-26 VITALS
DIASTOLIC BLOOD PRESSURE: 83 MMHG | WEIGHT: 231 LBS | SYSTOLIC BLOOD PRESSURE: 126 MMHG | BODY MASS INDEX: 32.34 KG/M2 | HEIGHT: 71 IN | HEART RATE: 104 BPM | OXYGEN SATURATION: 98 %

## 2024-01-26 DIAGNOSIS — E11.42 DIABETIC POLYNEUROPATHY ASSOCIATED WITH TYPE 2 DIABETES MELLITUS: ICD-10-CM

## 2024-01-26 DIAGNOSIS — E11.22 TYPE 2 DIABETES MELLITUS WITH STAGE 2 CHRONIC KIDNEY DISEASE, WITHOUT LONG-TERM CURRENT USE OF INSULIN: ICD-10-CM

## 2024-01-26 DIAGNOSIS — N18.2 TYPE 2 DIABETES MELLITUS WITH STAGE 2 CHRONIC KIDNEY DISEASE, WITHOUT LONG-TERM CURRENT USE OF INSULIN: ICD-10-CM

## 2024-01-26 DIAGNOSIS — E11.65 UNCONTROLLED TYPE 2 DIABETES MELLITUS WITH HYPERGLYCEMIA: Primary | ICD-10-CM

## 2024-01-26 LAB
EXPIRATION DATE: ABNORMAL
HBA1C MFR BLD: 7.7 % (ref 4.5–5.7)
Lab: ABNORMAL

## 2024-01-26 PROCEDURE — 99214 OFFICE O/P EST MOD 30 MIN: CPT | Performed by: NURSE PRACTITIONER

## 2024-01-26 PROCEDURE — 3079F DIAST BP 80-89 MM HG: CPT | Performed by: NURSE PRACTITIONER

## 2024-01-26 PROCEDURE — 1159F MED LIST DOCD IN RCRD: CPT | Performed by: NURSE PRACTITIONER

## 2024-01-26 PROCEDURE — 3074F SYST BP LT 130 MM HG: CPT | Performed by: NURSE PRACTITIONER

## 2024-01-26 PROCEDURE — 1160F RVW MEDS BY RX/DR IN RCRD: CPT | Performed by: NURSE PRACTITIONER

## 2024-01-26 PROCEDURE — 3051F HG A1C>EQUAL 7.0%<8.0%: CPT | Performed by: NURSE PRACTITIONER

## 2024-01-26 RX ORDER — GABAPENTIN 100 MG/1
100 CAPSULE ORAL 3 TIMES DAILY
Qty: 90 CAPSULE | Refills: 1 | Status: SHIPPED | OUTPATIENT
Start: 2024-01-26

## 2024-01-26 RX ORDER — ACETAMINOPHEN 500 MG
500 TABLET ORAL EVERY 6 HOURS PRN
COMMUNITY

## 2024-02-09 DIAGNOSIS — E78.5 HYPERLIPIDEMIA, UNSPECIFIED HYPERLIPIDEMIA TYPE: ICD-10-CM

## 2024-02-09 RX ORDER — ATORVASTATIN CALCIUM 20 MG/1
TABLET, FILM COATED ORAL
Qty: 90 TABLET | Refills: 0 | Status: SHIPPED | OUTPATIENT
Start: 2024-02-09

## 2024-02-26 ENCOUNTER — TELEPHONE (OUTPATIENT)
Dept: FAMILY MEDICINE CLINIC | Age: 69
End: 2024-02-26
Payer: MEDICARE

## 2024-02-26 NOTE — TELEPHONE ENCOUNTER
Caller: Maksim Acosta    Relationship to patient: Self    Best call back number: [phone]    Patient is needing: PATIENT CALLED IN AND IS REQUESTING A CALL BACK TO SEE WHICH LABS HE IS NEEDING TO HAVE DRAWN. PATIENT SAID IT IS OKAY TO LEAVE MESSAGE ON PHONE

## 2024-02-26 NOTE — TELEPHONE ENCOUNTER
Left a message that its time to recheck his potassium level, it was low in January.  Lab is open Mon- Fr, 7am- 6pm, no appts, only walk ins.  You should be fasting for this test.

## 2024-03-03 DIAGNOSIS — G47.00 INSOMNIA, UNSPECIFIED TYPE: ICD-10-CM

## 2024-03-04 RX ORDER — TRAZODONE HYDROCHLORIDE 100 MG/1
TABLET ORAL
Qty: 90 TABLET | Refills: 0 | Status: SHIPPED | OUTPATIENT
Start: 2024-03-04

## 2024-03-04 NOTE — TELEPHONE ENCOUNTER
Rx Refill Note  Requested Prescriptions     Pending Prescriptions Disp Refills    traZODone (DESYREL) 100 MG tablet [Pharmacy Med Name: traZODone HCl 100 MG Oral Tablet] 90 tablet 0     Sig: TAKE 1 TABLET BY MOUTH ONCE DAILY AT BEDTIME      Last office visit with prescribing clinician: 1/11/2024   Last telemedicine visit with prescribing clinician: Visit date not found   Next office visit with prescribing clinician: 4/3/2024  Last refill sent: 10/31/23, #90    Fabiola Sousa LPN  03/04/24, 11:13 EST

## 2024-03-11 ENCOUNTER — LAB (OUTPATIENT)
Dept: LAB | Facility: HOSPITAL | Age: 69
End: 2024-03-11
Payer: MEDICARE

## 2024-03-11 DIAGNOSIS — E87.6 LOW SERUM POTASSIUM: ICD-10-CM

## 2024-03-11 LAB
ANION GAP SERPL CALCULATED.3IONS-SCNC: 12.6 MMOL/L (ref 5–15)
BUN SERPL-MCNC: 9 MG/DL (ref 8–23)
BUN/CREAT SERPL: 10 (ref 7–25)
CALCIUM SPEC-SCNC: 9.1 MG/DL (ref 8.6–10.5)
CHLORIDE SERPL-SCNC: 100 MMOL/L (ref 98–107)
CO2 SERPL-SCNC: 29.4 MMOL/L (ref 22–29)
CREAT SERPL-MCNC: 0.9 MG/DL (ref 0.76–1.27)
EGFRCR SERPLBLD CKD-EPI 2021: 93 ML/MIN/1.73
GLUCOSE SERPL-MCNC: 181 MG/DL (ref 65–99)
POTASSIUM SERPL-SCNC: 3.1 MMOL/L (ref 3.5–5.2)
SODIUM SERPL-SCNC: 142 MMOL/L (ref 136–145)

## 2024-03-11 PROCEDURE — 36415 COLL VENOUS BLD VENIPUNCTURE: CPT

## 2024-03-11 PROCEDURE — 80048 BASIC METABOLIC PNL TOTAL CA: CPT

## 2024-03-12 DIAGNOSIS — E11.65 TYPE 2 DIABETES MELLITUS WITH HYPERGLYCEMIA, WITHOUT LONG-TERM CURRENT USE OF INSULIN: ICD-10-CM

## 2024-03-19 ENCOUNTER — LAB (OUTPATIENT)
Dept: LAB | Facility: HOSPITAL | Age: 69
End: 2024-03-19
Payer: MEDICARE

## 2024-03-19 DIAGNOSIS — E87.6 LOW SERUM POTASSIUM LEVEL: Primary | ICD-10-CM

## 2024-03-19 DIAGNOSIS — E87.6 LOW SERUM POTASSIUM LEVEL: ICD-10-CM

## 2024-03-19 DIAGNOSIS — Z12.5 SCREENING FOR PROSTATE CANCER: ICD-10-CM

## 2024-03-19 LAB
ANION GAP SERPL CALCULATED.3IONS-SCNC: 14.5 MMOL/L (ref 5–15)
BUN SERPL-MCNC: 8 MG/DL (ref 8–23)
BUN/CREAT SERPL: 8.5 (ref 7–25)
CALCIUM SPEC-SCNC: 9.5 MG/DL (ref 8.6–10.5)
CHLORIDE SERPL-SCNC: 102 MMOL/L (ref 98–107)
CO2 SERPL-SCNC: 27.5 MMOL/L (ref 22–29)
CREAT SERPL-MCNC: 0.94 MG/DL (ref 0.76–1.27)
EGFRCR SERPLBLD CKD-EPI 2021: 88.3 ML/MIN/1.73
GLUCOSE SERPL-MCNC: 159 MG/DL (ref 65–99)
POTASSIUM SERPL-SCNC: 3.1 MMOL/L (ref 3.5–5.2)
SODIUM SERPL-SCNC: 144 MMOL/L (ref 136–145)

## 2024-03-19 PROCEDURE — G0103 PSA SCREENING: HCPCS

## 2024-03-19 PROCEDURE — 36415 COLL VENOUS BLD VENIPUNCTURE: CPT

## 2024-03-19 PROCEDURE — 80048 BASIC METABOLIC PNL TOTAL CA: CPT

## 2024-03-21 ENCOUNTER — OFFICE VISIT (OUTPATIENT)
Dept: FAMILY MEDICINE CLINIC | Age: 69
End: 2024-03-21
Payer: MEDICARE

## 2024-03-21 VITALS
WEIGHT: 228.4 LBS | TEMPERATURE: 97.5 F | DIASTOLIC BLOOD PRESSURE: 86 MMHG | HEIGHT: 71 IN | BODY MASS INDEX: 31.98 KG/M2 | SYSTOLIC BLOOD PRESSURE: 125 MMHG | HEART RATE: 86 BPM

## 2024-03-21 DIAGNOSIS — G37.3 TRANSVERSE MYELITIS: ICD-10-CM

## 2024-03-21 DIAGNOSIS — Z00.00 ROUTINE GENERAL MEDICAL EXAMINATION AT A HEALTH CARE FACILITY: ICD-10-CM

## 2024-03-21 DIAGNOSIS — I10 ESSENTIAL HYPERTENSION: ICD-10-CM

## 2024-03-21 DIAGNOSIS — E87.6 CHRONIC HYPOKALEMIA: Primary | ICD-10-CM

## 2024-03-21 DIAGNOSIS — Z12.11 SCREEN FOR COLON CANCER: ICD-10-CM

## 2024-03-21 DIAGNOSIS — I10 ESSENTIAL (PRIMARY) HYPERTENSION: ICD-10-CM

## 2024-03-21 DIAGNOSIS — Z12.5 SCREENING FOR PROSTATE CANCER: ICD-10-CM

## 2024-03-21 LAB — PSA SERPL-MCNC: 0.5 NG/ML (ref 0–4)

## 2024-03-21 RX ORDER — LISINOPRIL 40 MG/1
40 TABLET ORAL DAILY
Qty: 90 TABLET | Refills: 0 | Status: SHIPPED | OUTPATIENT
Start: 2024-03-21

## 2024-03-21 RX ORDER — LISINOPRIL AND HYDROCHLOROTHIAZIDE 20; 12.5 MG/1; MG/1
TABLET ORAL
Qty: 180 TABLET | Refills: 0 | OUTPATIENT
Start: 2024-03-21

## 2024-03-21 NOTE — ASSESSMENT & PLAN NOTE
Will stop the HCTZ and switch to just the ACE, 40 mg daily and cut back K to 20 meq, recheck lab in 1 week

## 2024-03-21 NOTE — TELEPHONE ENCOUNTER
Rx Refill Note  Requested Prescriptions     Pending Prescriptions Disp Refills    lisinopril-hydrochlorothiazide (PRINZIDE,ZESTORETIC) 20-12.5 MG per tablet [Pharmacy Med Name: Lisinopril-hydroCHLOROthiazide 20-12.5 MG Oral Tablet] 180 tablet 0     Sig: Take 1 tablet by mouth twice daily      Last office visit with prescribing clinician: 1/11/2024   Last telemedicine visit with prescribing clinician: Visit date not found   Next office visit with prescribing clinician: 3/21/2024      The original prescription was discontinued on 3/21/2024 by Yolanda Marie APRN for the following reason: Alternate therapy. Renewing this prescription may not be appropriate.     Pt changed to plain lisinopril 40mg daily 03/21/24.      Fabiola Sousa LPN  03/21/24, 09:57 EDT

## 2024-03-21 NOTE — ASSESSMENT & PLAN NOTE
Advise regular exercise, healthy eating, always wear seat belts. Living will discussed, booklet given, fall prevention discussed.  Immunizations discussed. Advised to check on RSV vaccine   To continue yearly optometry and dental exams.

## 2024-03-21 NOTE — PROGRESS NOTES
The ABCs of the Annual Wellness Visit  Subsequent Medicare Wellness Visit    Subjective    Maksim Acosta is a 68 y.o. male who presents for a Subsequent Medicare Wellness Visit.    Medicare wellness HPI  Exercises regularly: yes   Eats healthy:yes   Last PSA:3- normal   Wears seatbelts: yes   Living will:no, booklet given   Optometrist:tiffany ZUNIGA  Dentist:NADIA  Tobacco:none   Alcohol intake:none   Drugs:THC some/occ    Falls:none   Colonoscopy: 2019  Immunizations: Tdap 3-2019, 2 pneum, UTD on flu and void and had 2 shingrex         The following portions of the patient's history were reviewed and   updated as appropriate: allergies, current medications, past family history, past medical history, past social history, past surgical history, and problem list.    Compared to one year ago, the patient feels his physical   health is worse.    Compared to one year ago, the patient feels his mental   health is the same.    Recent Hospitalizations:  He was admitted within the past 365 days at [facility].       Current Medical Providers:  Patient Care Team:  Yolanda Marie APRN as PCP - General (Family Medicine)    Outpatient Medications Prior to Visit   Medication Sig Dispense Refill    Accu-Chek Softclix Lancets lancets USE TO CHECK BLOOD SUGAR ONE TO TWO TIMES DAILY 100 each 0    acetaminophen (TYLENOL) 500 MG tablet Take 1 tablet by mouth Every 6 (Six) Hours As Needed for Mild Pain.      amLODIPine (NORVASC) 10 MG tablet Take 1 tablet by mouth once daily 90 tablet 0    atorvastatin (LIPITOR) 20 MG tablet Take 1 tablet by mouth once daily 90 tablet 0    cloNIDine (CATAPRES) 0.2 MG tablet TAKE 1/2 (ONE-HALF) TABLET BY MOUTH EVERY 12 HOURS 90 tablet 0    Combigan 0.2-0.5 % ophthalmic solution instill 1 drop into each eye twice daily      dapagliflozin Propanediol (Farxiga) 10 MG tablet Take 10 mg by mouth Daily for 180 days. 90 tablet 1    diclofenac (VOLTAREN) 0.1 % ophthalmic solution 1 drop  4 (Four) Times a Day.      Dulaglutide (Trulicity) 4.5 MG/0.5ML solution pen-injector Inject 0.5 mL under the skin into the appropriate area as directed Every 7 (Seven) Days for 180 days. 6 mL 1    Eliquis 5 MG tablet tablet Take 1 tablet by mouth Every 12 (Twelve) Hours.      gabapentin (NEURONTIN) 100 MG capsule Take 1 capsule by mouth 3 (Three) Times a Day. 90 capsule 1    glucose blood (Accu-Chek Marisela Plus) test strip USE STRIP TO CHECK GLUCOSE ONCE OR TWICE DAILY 100 each 0    metFORMIN (GLUCOPHAGE) 1000 MG tablet TAKE 1 TABLET BY MOUTH TWICE DAILY WITH MEALS 180 tablet 0    pantoprazole (PROTONIX) 40 MG EC tablet Take 1 tablet by mouth Daily.      potassium chloride 10 MEQ CR tablet Take 1 tablet by mouth twice daily 180 tablet 0    traZODone (DESYREL) 100 MG tablet TAKE 1 TABLET BY MOUTH ONCE DAILY AT BEDTIME 90 tablet 0    lisinopril-hydrochlorothiazide (PRINZIDE,ZESTORETIC) 20-12.5 MG per tablet Take 1 tablet by mouth twice daily 180 tablet 0     No facility-administered medications prior to visit.       No opioid medication identified on active medication list. I have reviewed chart for other potential  high risk medication/s and harmful drug interactions in the elderly.        Aspirin is not on active medication list.  Aspirin use is not indicated based on review of current medical condition/s. Risk of harm outweighs potential benefits.  .    Patient Active Problem List   Diagnosis    Mixed hyperlipidemia    Essential hypertension    Type 2 diabetes mellitus with hyperlipidemia    Encounter for general adult medical examination with abnormal findings    Insomnia    Dizziness    Numbness and tingling    Arm pain, right    Suspected condition    Body mass index (BMI) of 36.0-36.9 in adult    Chronic hypokalemia    Chronic midline low back pain without sciatica    Dental caries    Glaucoma due to type 2 diabetes mellitus    Mild cognitive impairment    Morbid (severe) obesity due to excess calories    JOYCE  "on CPAP    Immunization due    Neck pain    Cervical stenosis of spine    Transverse myelitis    Pulmonary embolism    Routine general medical examination at a health care facility    Screening for prostate cancer    Screen for colon cancer     Advance Care Planning   Advance Care Planning     Advance Directive is not on file.  ACP discussion was held with the patient during this visit. Patient does not have an advance directive, information provided.     Objective    Vitals:    24 0817   BP: 125/86   BP Location: Right arm   Patient Position: Sitting   Cuff Size: Large Adult   Pulse: 86   Temp: 97.5 °F (36.4 °C)   TempSrc: Oral   Weight: 104 kg (228 lb 6.4 oz)   Height: 180.3 cm (71\")     Estimated body mass index is 31.86 kg/m² as calculated from the following:    Height as of this encounter: 180.3 cm (71\").    Weight as of this encounter: 104 kg (228 lb 6.4 oz).    BMI is >= 30 and <35. (Class 1 Obesity). The following options were offered after discussion;: exercise counseling/recommendations and nutrition counseling/recommendations      Does the patient have evidence of cognitive impairment? No    Lab Results   Component Value Date    HGBA1C 7.7 (A) 2024        HEALTH RISK ASSESSMENT    Smoking Status:  Social History     Tobacco Use   Smoking Status Former    Current packs/day: 0.00    Average packs/day: 0.5 packs/day for 15.0 years (7.5 ttl pk-yrs)    Types: Cigarettes    Start date:     Quit date:     Years since quittin.2    Passive exposure: Never   Smokeless Tobacco Never     Alcohol Consumption:  Social History     Substance and Sexual Activity   Alcohol Use Never    Comment: NON-DRINKER     Fall Risk Screen:    STEADI Fall Risk Assessment was completed, and patient is at LOW risk for falls.Assessment completed on:2024    Depression Screenin/11/2024     1:00 PM   PHQ-2/PHQ-9 Depression Screening   Little Interest or Pleasure in Doing Things 0-->not at all   Feeling " Down, Depressed or Hopeless 0-->not at all   PHQ-9: Brief Depression Severity Measure Score 0       Health Habits and Functional and Cognitive Screening:      3/21/2024     8:19 AM   Functional & Cognitive Status   Do you have difficulty preparing food and eating? No   Do you have difficulty bathing yourself, getting dressed or grooming yourself? No   Do you have difficulty using the toilet? No   Do you have difficulty moving around from place to place? No   Do you have trouble with steps or getting out of a bed or a chair? No   Current Diet Well Balanced Diet   Dental Exam Up to date   Eye Exam Up to date   Exercise (times per week) 7 times per week   Current Exercises Include Walking   Do you need help using the phone?  No   Are you deaf or do you have serious difficulty hearing?  No   Do you need help to go to places out of walking distance? No   Do you need help shopping? No   Do you need help preparing meals?  No   Do you need help with housework?  No   Do you need help with laundry? No   Do you need help taking your medications? No   Do you need help managing money? No   Do you ever drive or ride in a car without wearing a seat belt? No   Have you felt unusual stress, anger or loneliness in the last month? No   Who do you live with? Alone   If you need help, do you have trouble finding someone available to you? No   Have you been bothered in the last four weeks by sexual problems? No   Do you have difficulty concentrating, remembering or making decisions? No       Age-appropriate Screening Schedule:  Refer to the list below for future screening recommendations based on patient's age, sex and/or medical conditions. Orders for these recommended tests are listed in the plan section. The patient has been provided with a written plan.    Health Maintenance   Topic Date Due    BMI FOLLOWUP  10/28/2022    RSV Vaccine - Adults (1 - 1-dose 60+ series) 03/21/2025 (Originally 8/17/2015)    HEMOGLOBIN A1C  07/26/2024     DIABETIC FOOT EXAM  10/03/2024    LIPID PANEL  10/03/2024    URINE MICROALBUMIN  10/03/2024    DIABETIC EYE EXAM  01/23/2025    ANNUAL WELLNESS VISIT  03/21/2025    TDAP/TD VACCINES (2 - Td or Tdap) 03/19/2029    COLORECTAL CANCER SCREENING  07/29/2029    HEPATITIS C SCREENING  Completed    COVID-19 Vaccine  Completed    INFLUENZA VACCINE  Completed    Pneumococcal Vaccine 65+  Completed    AAA SCREEN (ONE-TIME)  Completed    ZOSTER VACCINE  Completed                  CMS Preventative Services Quick Reference  Risk Factors Identified During Encounter  Immunizations Discussed/Encouraged: RSV (Respiratory Syncytial Virus)  The above risks/problems have been discussed with the patient.  Pertinent information has been shared with the patient in the After Visit Summary.  An After Visit Summary and PPPS were made available to the patient.    Follow Up:   Next Medicare Wellness visit to be scheduled in 1 year.       Additional E&M Note during same encounter follows:  Patient has multiple medical problems which are significant and separately identifiable that require additional work above and beyond the Medicare Wellness Visit.      Chief Complaint  Medicare Wellness-subsequent    Subjective        Hypertension:  Current medication: norvasc, Lisinorpil HCTZ 20/12/5 Bid and K 30 meq daily & clonidine   Tolerating Medication: Yes  Labs:  Lab Results       Component                Value               Date                       GLUCOSE                  159 (H)             03/19/2024                 BUN                      8                   03/19/2024                 CREATININE               0.94                03/19/2024                 EGFRIFAFRI               81                  09/27/2021                 BCR                      8.5                 03/19/2024                 K                        3.1 (L)             03/19/2024                 CO2                      27.5                03/19/2024                  CALCIUM                  9.5                 2024                 ALBUMIN                  4.4                 10/03/2023                 LABIL2                   1.4                 2021                 AST                      15                  10/03/2023                 ALT                      14                  10/03/2023                Past Medical History changes since 2024:      Hospitalizations:      : transverse myelitis/ seeing Dr Kim        Hyperlipidemia    Hypertension     Sleep Apnea: (+) sleep study; uses CPAP;     Chronic Pain: affecting the low back;     Type 2 Diabetes: dx'd in ;     Glaucoma: dx'd in ;         PREVENTIVE HEALTH MAINTENANCE             COLORECTAL CANCER SCREENING: Up to date (colonoscopy q10y; sigmoidoscopy q5y; Cologuard q3y) was last done 19, Results are in chart; colonoscopy with the following abnormalities noted-- Diverticulosis; The next colonoscopy is due  ; 2014    Hepatitis C Medicare Screening: was last done 17; negative             CURRENT MEDICAL PROVIDERS:    Dentist: Dr. Cabrera    Neurologist:  was seeing Dr Yap     Ophthalmologist: Johnny     Pulmonologist: Dr. Fischer - [place]                   Surgical History:       Right shoulder surgery 2021 R rotator cuff repair  Duber     Procedures:    Colonoscopy         Family History:     Father:  at age 83;  Dementia;  Type 2 Diabetes     Mother:  at age 51; Cause of death was CVA     Brother(s): 3 brother(s) total; 2 ;  Myocardial Infarction (  71 );  cancer of pharynx     Sister(s): 2 sister(s) total; 2 ;   at birth     Daughter(s): 2 daughter(s) total         Social History:     Occupation: Disabled (due to low back pain)     Marital Status:      Children: 2 children            Maksim Acosta is also being seen today for wellness and follow up     Review of Systems   Constitutional:  Negative for fatigue and  "fever.   HENT:  Negative for sore throat.    Eyes:  Negative for visual disturbance.   Respiratory:  Negative for cough and shortness of breath.    Cardiovascular:  Negative for chest pain, palpitations and leg swelling.   Gastrointestinal:  Negative for abdominal pain and blood in stool.   Genitourinary:  Negative for difficulty urinating.   Musculoskeletal:  Positive for back pain (chronic).   Skin:  Negative for rash.   Neurological:  Positive for dizziness (he feels off balance) and weakness (on right side, improving).   Psychiatric/Behavioral:  Negative for sleep disturbance.        Objective   Vital Signs:  /86 (BP Location: Right arm, Patient Position: Sitting, Cuff Size: Large Adult)   Pulse 86   Temp 97.5 °F (36.4 °C) (Oral)   Ht 180.3 cm (71\")   Wt 104 kg (228 lb 6.4 oz)   BMI 31.86 kg/m²     Physical Exam  Vitals reviewed.   Constitutional:       Appearance: Normal appearance. He is well-developed.   HENT:      Head: Normocephalic and atraumatic.      Right Ear: External ear normal.      Left Ear: External ear normal.      Mouth/Throat:      Pharynx: No oropharyngeal exudate.   Eyes:      Conjunctiva/sclera: Conjunctivae normal.      Pupils: Pupils are equal, round, and reactive to light.   Cardiovascular:      Rate and Rhythm: Normal rate and regular rhythm.      Heart sounds: No murmur heard.     No friction rub. No gallop.   Pulmonary:      Effort: Pulmonary effort is normal.      Breath sounds: Normal breath sounds. No wheezing or rhonchi.   Abdominal:      General: Bowel sounds are normal. There is no distension.      Palpations: Abdomen is soft.      Tenderness: There is abdominal tenderness (generalized, no point tenderness). There is no guarding or rebound.      Comments:       Skin:     General: Skin is warm and dry.   Neurological:      Mental Status: He is alert and oriented to person, place, and time.      Cranial Nerves: No cranial nerve deficit.   Psychiatric:         Mood and " Affect: Mood and affect normal.         Behavior: Behavior normal.         Thought Content: Thought content normal.         Judgment: Judgment normal.                         Assessment and Plan   Diagnoses and all orders for this visit:    1. Chronic hypokalemia (Primary)  Assessment & Plan:  Will stop the HCTZ and switch to just the ACE, 40 mg daily and cut back K to 20 meq, recheck lab in 1 week     Orders:  -     Basic metabolic panel; Future    2. Routine general medical examination at a health care facility  Assessment & Plan:  Advise regular exercise, healthy eating, always wear seat belts. Living will discussed, booklet given, fall prevention discussed.  Immunizations discussed. Advised to check on RSV vaccine   To continue yearly optometry and dental exams.          3. Screening for prostate cancer  Assessment & Plan:  Discussed and will check PSA with next lab     Orders:  -     PSA SCREENING; Future    4. Screen for colon cancer  Assessment & Plan:  Pt would like to go to [facility], set up a consult for screening     Orders:  -     Ambulatory Referral For Screening Colonoscopy    5. Essential hypertension  Assessment & Plan:  Monitor bp at home     Orders:  -     lisinopril (PRINIVIL,ZESTRIL) 40 MG tablet; Take 1 tablet by mouth Daily.  Dispense: 90 tablet; Refill: 0    6. Transverse myelitis  Assessment & Plan:  Reviewed MRI in 1-2024 and keep follow up appt with neuorlogy                Follow Up   Return if symptoms worsen or fail to improve, for followup pending lab results.  Patient was given instructions and counseling regarding his condition or for health maintenance advice. Please see specific information pulled into the AVS if appropriate.

## 2024-03-27 DIAGNOSIS — I10 ESSENTIAL (PRIMARY) HYPERTENSION: ICD-10-CM

## 2024-03-27 RX ORDER — CLONIDINE HYDROCHLORIDE 0.2 MG/1
0.1 TABLET ORAL EVERY 12 HOURS
Qty: 30 TABLET | Refills: 0 | Status: SHIPPED | OUTPATIENT
Start: 2024-03-27

## 2024-03-27 NOTE — TELEPHONE ENCOUNTER
Rx Refill Note  Requested Prescriptions     Pending Prescriptions Disp Refills    cloNIDine (CATAPRES) 0.2 MG tablet [Pharmacy Med Name: cloNIDine HCl 0.2 MG Oral Tablet] 90 tablet 0     Sig: TAKE 1/2 (ONE-HALF) TABLET BY MOUTH EVERY 12 HOURS      Last office visit with prescribing clinician: 3/21/2024   Last telemedicine visit with prescribing clinician: Visit date not found   Next office visit with prescribing clinician: 9/26/2024  Last refill sent: 12/26/23, #90    ONEIL Marie is out of the office until 04/02/24.  Sent to on call provider Dr Iverson.     Fabiola Sousa, LPN  03/27/24, 09:15 EDT

## 2024-04-01 ENCOUNTER — TELEPHONE (OUTPATIENT)
Dept: FAMILY MEDICINE CLINIC | Age: 69
End: 2024-04-01
Payer: MEDICARE

## 2024-04-01 ENCOUNTER — LAB (OUTPATIENT)
Dept: LAB | Facility: HOSPITAL | Age: 69
End: 2024-04-01
Payer: MEDICARE

## 2024-04-01 DIAGNOSIS — E87.6 CHRONIC HYPOKALEMIA: ICD-10-CM

## 2024-04-01 LAB
ANION GAP SERPL CALCULATED.3IONS-SCNC: 13.9 MMOL/L (ref 5–15)
BUN SERPL-MCNC: 11 MG/DL (ref 8–23)
BUN/CREAT SERPL: 12.2 (ref 7–25)
CALCIUM SPEC-SCNC: 9.5 MG/DL (ref 8.6–10.5)
CHLORIDE SERPL-SCNC: 108 MMOL/L (ref 98–107)
CO2 SERPL-SCNC: 22.1 MMOL/L (ref 22–29)
CREAT SERPL-MCNC: 0.9 MG/DL (ref 0.76–1.27)
EGFRCR SERPLBLD CKD-EPI 2021: 93 ML/MIN/1.73
GLUCOSE SERPL-MCNC: 162 MG/DL (ref 65–99)
POTASSIUM SERPL-SCNC: 3.6 MMOL/L (ref 3.5–5.2)
SODIUM SERPL-SCNC: 144 MMOL/L (ref 136–145)

## 2024-04-01 PROCEDURE — 80048 BASIC METABOLIC PNL TOTAL CA: CPT

## 2024-04-01 PROCEDURE — 36415 COLL VENOUS BLD VENIPUNCTURE: CPT

## 2024-04-01 NOTE — TELEPHONE ENCOUNTER
Reported b/p readings: 128/90, 131/68, 125/82, 140/90, 149/99, 142/95, 148/102, 138/94, 128/89, 140/81.

## 2024-04-04 NOTE — TELEPHONE ENCOUNTER
I cut the HCTZ, let him just on his ACE, these are his readings:Reported b/p readings: 128/90, 131/68, 125/82, 140/90, 149/99, 142/95, 148/102, 138/94, 128/89, 140/81.   How is he feeling, did he have a follow lab to recheck his K

## 2024-04-04 NOTE — TELEPHONE ENCOUNTER
Pt informed.  He said he turned in his b/p readings on Monday 04/01/24.  He hasn't checked it since then.  Advised him to check it daily and let us know if his b/p starts to go up since he is no longer taking the hctz.  He said okay, he will let us know if it does.  No swelling issues other than his stomach which its been like that for a while now.  He is scheduled for a colonoscopy on April 29th.   Lab done on 03/19/24, and repeated on 04/01/24.

## 2024-04-10 DIAGNOSIS — E11.42 DIABETIC POLYNEUROPATHY ASSOCIATED WITH TYPE 2 DIABETES MELLITUS: ICD-10-CM

## 2024-04-11 RX ORDER — GABAPENTIN 100 MG/1
100 CAPSULE ORAL 3 TIMES DAILY
Qty: 90 CAPSULE | Refills: 0 | Status: SHIPPED | OUTPATIENT
Start: 2024-04-11

## 2024-04-12 RX ORDER — DULAGLUTIDE 4.5 MG/.5ML
INJECTION, SOLUTION SUBCUTANEOUS
Qty: 6 ML | Refills: 0 | Status: SHIPPED | OUTPATIENT
Start: 2024-04-12

## 2024-04-16 DIAGNOSIS — I10 ESSENTIAL (PRIMARY) HYPERTENSION: ICD-10-CM

## 2024-04-16 RX ORDER — AMLODIPINE BESYLATE 10 MG/1
TABLET ORAL
Qty: 90 TABLET | Refills: 1 | Status: SHIPPED | OUTPATIENT
Start: 2024-04-16

## 2024-04-17 ENCOUNTER — TELEPHONE (OUTPATIENT)
Dept: DIABETES SERVICES | Facility: HOSPITAL | Age: 69
End: 2024-04-17
Payer: MEDICARE

## 2024-04-17 NOTE — TELEPHONE ENCOUNTER
Maksim Acosta (Key: [id number])  Rx #: [id number]  Trulicity 4.5MG/0.5ML pen-injectorsApprovedtoday  The request has been approved. The authorization is effective from 04/17/2024 to 04/17/2025, as long as the member is enrolled in their current health plan. A written notification letter will follow with additional details.

## 2024-04-19 ENCOUNTER — TELEPHONE (OUTPATIENT)
Dept: DIABETES SERVICES | Facility: HOSPITAL | Age: 69
End: 2024-04-19

## 2024-04-19 NOTE — TELEPHONE ENCOUNTER
Caller: Benjamin Acosta    Relationship: Self    Best call back number: [phone]    Requested Prescriptions:     Dulaglutide (Trulicity) 4.5 MG/0.5ML solution pen-injector     Requested Prescriptions      No prescriptions requested or ordered in this encounter        Pharmacy where request should be sent:  [facility]- [id number] [facility]    Last office visit with prescribing clinician: 1/26/2024   Last telemedicine visit with prescribing clinician: Visit date not found   Next office visit with prescribing clinician: 5/10/2024     Additional details provided by patient: PT NEEDS TO GET HIS MEDICATION REFILLED BUT HE'S CALLED HIS PHARMACY AND OTHER PHARMACIES IN [place] AND NO ONE HAS THIS MEDICATION IN STOCK. PT IS SUPPOSE TAKE HIS MEDICATION 3 DAYS AGO. PT WOULD LIKE TO FIND OUT WHAT SHOULD YOU DO??    Does the patient have less than a 3 day supply:  [x] Yes  [] No    Would you like a call back once the refill request has been completed: [x] Yes [] No    If the office needs to give you a call back, can they leave a voicemail: [x] Yes [] No    Teresa Reyes Rep   04/19/24 15:27 EDT

## 2024-04-19 NOTE — TELEPHONE ENCOUNTER
Patient was called.  He has located the medication at [facility] and they are filling it for him now.

## 2024-04-22 RX ORDER — APIXABAN 5 MG/1
5 TABLET, FILM COATED ORAL EVERY 12 HOURS SCHEDULED
Qty: 60 TABLET | Status: CANCELLED | OUTPATIENT
Start: 2024-04-22

## 2024-04-22 RX ORDER — PANTOPRAZOLE SODIUM 40 MG/1
40 TABLET, DELAYED RELEASE ORAL DAILY
Status: CANCELLED | OUTPATIENT
Start: 2024-04-22

## 2024-04-22 NOTE — TELEPHONE ENCOUNTER
Baltazar with the [facility] called and said pt was in the hospital back in Dec 2023 with a PE and was put on Eliquis 5mg.  Dr Heath Ann gave him 3 months and pcp needs to take over now.

## 2024-04-22 NOTE — TELEPHONE ENCOUNTER
Caller: Maksim Acosta    Relationship to patient: Self    Best call back number: [phone]    Patient is needing: PATIENT CALLED IN AND WOULD LIKE A CALL BACK WITH GUIDANCE AS TO IF HE IS STILL SUPPOSED TO BE TAKING PANTOPRAZOLE AND IF SO HE IS NEEDING A REFILL. PATIENT SAW [facility] DOCTOR THAT IS RAISING HIS GABAPENTIN FROM 100 MG  MG 3 TIMES DAILY. PATIENT SAID PROVIDER SAID IF HE IS STILL HAVING DISCOMFORT PROVIDER WILL ADD BACLOFEN AT NIGHT. PATIENT SAID [facility] PROVIDER WOULD LIKE TO MONITOR PATIENT FOR BLOOD CLOTS IN LUNG. PATIENT IS REQUESTING A CALL BACK PLEASE.      [facility] [address] [place] [address] [address] [facility] - [phone]  - [phone]  [phone]

## 2024-04-23 DIAGNOSIS — I10 ESSENTIAL (PRIMARY) HYPERTENSION: ICD-10-CM

## 2024-04-23 RX ORDER — PANTOPRAZOLE SODIUM 40 MG/1
40 TABLET, DELAYED RELEASE ORAL DAILY
Qty: 90 TABLET | Refills: 0 | Status: SHIPPED | OUTPATIENT
Start: 2024-04-23

## 2024-04-23 RX ORDER — APIXABAN 5 MG/1
5 TABLET, FILM COATED ORAL EVERY 12 HOURS SCHEDULED
Qty: 60 TABLET | Refills: 1 | Status: SHIPPED | OUTPATIENT
Start: 2024-04-23

## 2024-04-23 RX ORDER — POTASSIUM CHLORIDE 750 MG/1
TABLET, FILM COATED, EXTENDED RELEASE ORAL
Qty: 180 TABLET | Refills: 0 | Status: SHIPPED | OUTPATIENT
Start: 2024-04-23

## 2024-04-24 NOTE — TELEPHONE ENCOUNTER
He has history of PE, needs to stay on his rx's, see his specialist, I sent refill /call and talk to him

## 2024-04-24 NOTE — TELEPHONE ENCOUNTER
Pt called back and he said he has been out of the eliquis for almost a month now.  He did pick it up yesterday at the pharmacy.  He sees a MS doctor but not a pulmonologist.  He was started on this eliquis due to a PE in the hospital.  He said his MS doctor does not treat PE and manage meds.

## 2024-05-10 DIAGNOSIS — I10 ESSENTIAL (PRIMARY) HYPERTENSION: ICD-10-CM

## 2024-05-10 RX ORDER — CLONIDINE HYDROCHLORIDE 0.2 MG/1
0.1 TABLET ORAL EVERY 12 HOURS
Qty: 30 TABLET | Refills: 0 | Status: SHIPPED | OUTPATIENT
Start: 2024-05-10

## 2024-05-21 DIAGNOSIS — E78.5 HYPERLIPIDEMIA, UNSPECIFIED HYPERLIPIDEMIA TYPE: ICD-10-CM

## 2024-05-21 RX ORDER — ATORVASTATIN CALCIUM 20 MG/1
TABLET, FILM COATED ORAL
Qty: 90 TABLET | Refills: 0 | Status: SHIPPED | OUTPATIENT
Start: 2024-05-21 | End: 2024-05-22 | Stop reason: SDUPTHER

## 2024-05-22 ENCOUNTER — LAB (OUTPATIENT)
Dept: LAB | Facility: HOSPITAL | Age: 69
End: 2024-05-22
Payer: MEDICARE

## 2024-05-22 ENCOUNTER — OFFICE VISIT (OUTPATIENT)
Dept: FAMILY MEDICINE CLINIC | Age: 69
End: 2024-05-22
Payer: MEDICARE

## 2024-05-22 VITALS
HEART RATE: 85 BPM | SYSTOLIC BLOOD PRESSURE: 131 MMHG | BODY MASS INDEX: 33.4 KG/M2 | WEIGHT: 238.6 LBS | HEIGHT: 71 IN | DIASTOLIC BLOOD PRESSURE: 90 MMHG

## 2024-05-22 DIAGNOSIS — G47.00 INSOMNIA, UNSPECIFIED TYPE: ICD-10-CM

## 2024-05-22 DIAGNOSIS — E78.2 MIXED HYPERLIPIDEMIA: Primary | ICD-10-CM

## 2024-05-22 DIAGNOSIS — I10 ESSENTIAL HYPERTENSION: ICD-10-CM

## 2024-05-22 DIAGNOSIS — E11.69 TYPE 2 DIABETES MELLITUS WITH HYPERLIPIDEMIA: ICD-10-CM

## 2024-05-22 DIAGNOSIS — E78.5 TYPE 2 DIABETES MELLITUS WITH HYPERLIPIDEMIA: ICD-10-CM

## 2024-05-22 DIAGNOSIS — I26.99 OTHER PULMONARY EMBOLISM WITHOUT ACUTE COR PULMONALE, UNSPECIFIED CHRONICITY: ICD-10-CM

## 2024-05-22 DIAGNOSIS — M54.2 NECK PAIN: ICD-10-CM

## 2024-05-22 LAB
ALBUMIN SERPL-MCNC: 4.6 G/DL (ref 3.5–5.2)
ALBUMIN/GLOB SERPL: 1.8 G/DL
ALP SERPL-CCNC: 77 U/L (ref 39–117)
ALT SERPL W P-5'-P-CCNC: 14 U/L (ref 1–41)
ANION GAP SERPL CALCULATED.3IONS-SCNC: 10.7 MMOL/L (ref 5–15)
AST SERPL-CCNC: 12 U/L (ref 1–40)
BILIRUB SERPL-MCNC: 0.5 MG/DL (ref 0–1.2)
BUN SERPL-MCNC: 8 MG/DL (ref 8–23)
BUN/CREAT SERPL: 8.6 (ref 7–25)
CALCIUM SPEC-SCNC: 10 MG/DL (ref 8.6–10.5)
CHLORIDE SERPL-SCNC: 104 MMOL/L (ref 98–107)
CHOLEST SERPL-MCNC: 151 MG/DL (ref 0–200)
CO2 SERPL-SCNC: 27.3 MMOL/L (ref 22–29)
CREAT SERPL-MCNC: 0.93 MG/DL (ref 0.76–1.27)
EGFRCR SERPLBLD CKD-EPI 2021: 89.4 ML/MIN/1.73
GLOBULIN UR ELPH-MCNC: 2.5 GM/DL
GLUCOSE SERPL-MCNC: 152 MG/DL (ref 65–99)
HDLC SERPL-MCNC: 56 MG/DL (ref 40–60)
LDLC SERPL CALC-MCNC: 68 MG/DL (ref 0–100)
LDLC/HDLC SERPL: 1.11 {RATIO}
POTASSIUM SERPL-SCNC: 3.7 MMOL/L (ref 3.5–5.2)
PROT SERPL-MCNC: 7.1 G/DL (ref 6–8.5)
SODIUM SERPL-SCNC: 142 MMOL/L (ref 136–145)
TRIGL SERPL-MCNC: 163 MG/DL (ref 0–150)
VLDLC SERPL-MCNC: 27 MG/DL (ref 5–40)

## 2024-05-22 PROCEDURE — 3075F SYST BP GE 130 - 139MM HG: CPT | Performed by: NURSE PRACTITIONER

## 2024-05-22 PROCEDURE — 36415 COLL VENOUS BLD VENIPUNCTURE: CPT | Performed by: NURSE PRACTITIONER

## 2024-05-22 PROCEDURE — 99214 OFFICE O/P EST MOD 30 MIN: CPT | Performed by: NURSE PRACTITIONER

## 2024-05-22 PROCEDURE — 1160F RVW MEDS BY RX/DR IN RCRD: CPT | Performed by: NURSE PRACTITIONER

## 2024-05-22 PROCEDURE — 80061 LIPID PANEL: CPT | Performed by: NURSE PRACTITIONER

## 2024-05-22 PROCEDURE — 1159F MED LIST DOCD IN RCRD: CPT | Performed by: NURSE PRACTITIONER

## 2024-05-22 PROCEDURE — 3051F HG A1C>EQUAL 7.0%<8.0%: CPT | Performed by: NURSE PRACTITIONER

## 2024-05-22 PROCEDURE — 3080F DIAST BP >= 90 MM HG: CPT | Performed by: NURSE PRACTITIONER

## 2024-05-22 PROCEDURE — 1125F AMNT PAIN NOTED PAIN PRSNT: CPT | Performed by: NURSE PRACTITIONER

## 2024-05-22 PROCEDURE — 80053 COMPREHEN METABOLIC PANEL: CPT | Performed by: NURSE PRACTITIONER

## 2024-05-22 RX ORDER — BACLOFEN 10 MG/1
10 TABLET ORAL
COMMUNITY
Start: 2024-04-22

## 2024-05-22 RX ORDER — ATORVASTATIN CALCIUM 20 MG/1
20 TABLET, FILM COATED ORAL DAILY
Qty: 90 TABLET | Refills: 1 | Status: SHIPPED | OUTPATIENT
Start: 2024-05-22

## 2024-05-22 RX ORDER — GABAPENTIN 300 MG/1
300 CAPSULE ORAL 3 TIMES DAILY
COMMUNITY
Start: 2024-05-22

## 2024-05-22 RX ORDER — TRAZODONE HYDROCHLORIDE 100 MG/1
TABLET ORAL
Qty: 90 TABLET | Refills: 0 | Status: SHIPPED | OUTPATIENT
Start: 2024-05-22 | End: 2024-05-22 | Stop reason: SDUPTHER

## 2024-05-22 RX ORDER — DICYCLOMINE HCL 20 MG
1 TABLET ORAL 3 TIMES DAILY
COMMUNITY
Start: 2024-05-06

## 2024-05-22 RX ORDER — TRAZODONE HYDROCHLORIDE 100 MG/1
100 TABLET ORAL
Qty: 90 TABLET | Refills: 1 | Status: SHIPPED | OUTPATIENT
Start: 2024-05-22

## 2024-05-22 NOTE — ASSESSMENT & PLAN NOTE
Advised to Continue to monitor BP at home. Continue current meds for now. Continue to modify diet and lifestyle.

## 2024-05-22 NOTE — PROGRESS NOTES
Chief Complaint  Hypertension & HLD, insomnia     Subjective          Maksim Acosta presents to [facility]    History of Present Illness  Hypertension:  Current medication: norvasc, clonidine, lisinopril , K (off HCTZ)  Tolerating Medication: Yes  Checking BP at home and it is: 121-153 over   Needs refills: no  Labs:  Lab Results       Component                Value               Date                       GLUCOSE                  162 (H)             04/01/2024                 BUN                      11                  04/01/2024                 CREATININE               0.90                04/01/2024                 EGFRIFAFRI               81                  09/27/2021                 BCR                      12.2                04/01/2024                 K                        3.6                 04/01/2024                 CO2                      22.1                04/01/2024                 CALCIUM                  9.5                 04/01/2024                 ALBUMIN                  4.4                 10/03/2023                 LABIL2                   1.4                 03/24/2021                 AST                      15                  10/03/2023                 ALT                      14                  10/03/2023              Hyperlipidemia  Current medication: lipitor   Tolerating medication: Yes   Needs Refill: Yes to [facility]    Lab Results       Component                Value               Date                       CHOL                     147                 10/03/2023                 CHLPL                    122                 03/24/2021                 TRIG                     122                 10/03/2023                 HDL                      53                  10/03/2023                 LDL                      72                  10/03/2023              Anxiety/ Depression/ Insomnia  Current medication: trazodone   Tolerating  medication: Yes  Refills needed: yes to [facility]     Diabetes:  Sees ANYA Osman   Current medication: trulicity, farxiga and metformin     Lab Results       Component                Value               Date                       HGBA1C                   7.7 (A)             2024              Will see myeltitis specialist in 2024      History of PE eliquis (he called office on 24: Pt called back and he said he has been out of the eliquis for almost a month now.  He did pick it up yesterday at the pharmacy.  He sees a MS doctor but not a pulmonologist.  He was started on this eliquis due to a PE in the hospital in   He said his MS doctor does not treat PE and manage meds)     Past Medical History changes since 3-:      Hyperlipidemia    Hypertension     Sleep Apnea: (+) sleep study; uses CPAP;     Chronic Pain: affecting the low back;     Type 2 Diabetes: dx'd in ;     Glaucoma: dx'd in ;   PE     Hospitalizations:      2023: transverse myelitis/ seeing Dr Kim (developed a PE)               PREVENTIVE HEALTH MAINTENANCE             COLORECTAL CANCER SCREENING: Up to date (colonoscopy q10y; sigmoidoscopy q5y; Cologuard q3y) was last done 24 with diverticulosis and spastic colon, Dr Rushing  & 19, Results are in chart; colonoscopy with the following abnormalities noted-- Diverticulosis; The next colonoscopy is due  ; 2014     Hepatitis C Medicare Screening: was last done 17; negative             CURRENT MEDICAL PROVIDERS:    Dentist: Dr. Cabrera    Neurologist:  was seeing Dr Yap/ bindu Kim     Ophthalmologist: Johnny     Pulmonologist: Dr. Fischer - sleep                   Surgical History:       Right shoulder surgery 2021 R rotator cuff repair  Duber     Procedures:    Colonoscopy         Family History:       Father:  at age 83;  Dementia;  Type 2 Diabetes     Mother:  at age 51; Cause of death was CVA      Brother(s): 3 brother(s) total; 2 ;  Myocardial Infarction (  71 );  cancer of pharynx     Sister(s): 2 sister(s) total; 2 ;   at birth     Daughter(s): 2 daughter(s) total         Social History:       Occupation: Disabled (due to low back pain)     Marital Status:      Children: 2 children                    Past Medical History:   Diagnosis Date    Acanthosis nigricans     Chronic pain     LOW BACK     Essential (primary) hypertension     Glaucoma     DX'D IN     H/O repair of right rotator cuff     3 months later a manipulation needed    Hyperlipidemia, unspecified     Hypokalemia     Insomnia, unspecified     Low back pain     Nicotine dependence, cigarettes, in remission     Obstructive sleep apnea (adult) (pediatric)     (+) SLEEP STUDY; USES CPAP    Other symptoms and signs involving cognitive functions and awareness     Pain in left leg     Pain in left thigh     Personal history of colonic polyps     Psoriasis, unspecified     Type 2 diabetes mellitus with hyperglycemia     DX'D IN     Vitamin D deficiency        No Known Allergies     Past Surgical History:   Procedure Laterality Date    COLONOSCOPY N/A 2024    diverticulosis and spastic colon /dr Rushing    ROTATOR CUFF REPAIR Right     SHOULDER ARTHROSCOPY Right 2021        Social History     Tobacco Use    Smoking status: Former     Current packs/day: 0.00     Average packs/day: 0.5 packs/day for 15.0 years (7.5 ttl pk-yrs)     Types: Cigarettes     Start date:      Quit date:      Years since quittin.3     Passive exposure: Never    Smokeless tobacco: Never   Substance Use Topics    Alcohol use: Never     Comment: NON-DRINKER       Family History   Problem Relation Age of Onset    Stroke Mother     Dementia Father     Diabetes type II Father     Heart attack Brother 71    Cancer Brother         PHARYNX        Health Maintenance Due   Topic Date Due    COVID-19 Vaccine (  season) 02/03/2024    HEMOGLOBIN A1C  07/26/2024        Current Outpatient Medications on File Prior to Visit   Medication Sig    Accu-Chek Softclix Lancets lancets USE TO CHECK BLOOD SUGAR ONE TO TWO TIMES DAILY    acetaminophen (TYLENOL) 500 MG tablet Take 1 tablet by mouth Every 6 (Six) Hours As Needed for Mild Pain.    amLODIPine (NORVASC) 10 MG tablet Take 1 tablet by mouth once daily    baclofen (LIORESAL) 10 MG tablet Take 1 tablet by mouth every night at bedtime.    cloNIDine (CATAPRES) 0.2 MG tablet TAKE 1/2 (ONE-HALF) TABLET BY MOUTH EVERY 12 HOURS    Combigan 0.2-0.5 % ophthalmic solution instill 1 drop into each eye twice daily    dapagliflozin Propanediol (Farxiga) 10 MG tablet Take 10 mg by mouth Daily for 180 days.    diclofenac (VOLTAREN) 0.1 % ophthalmic solution 1 drop 4 (Four) Times a Day.    dicyclomine (BENTYL) 20 MG tablet Take 1 tablet by mouth 3 times a day.    Dulaglutide (Trulicity) 4.5 MG/0.5ML solution pen-injector INJECT 4.5 MG SUBCUTANEOUSLY INTO THE APPROPRIATE AREA AS DIRECTED EVERY 7 DAYS    Eliquis 5 MG tablet tablet Take 1 tablet by mouth Every 12 (Twelve) Hours.    gabapentin (NEURONTIN) 300 MG capsule Take 1 capsule by mouth 3 (Three) Times a Day.    glucose blood (Accu-Chek Marisela Plus) test strip USE STRIP TO CHECK GLUCOSE ONCE OR TWICE DAILY    lisinopril (PRINIVIL,ZESTRIL) 40 MG tablet Take 1 tablet by mouth Daily.    metFORMIN (GLUCOPHAGE) 1000 MG tablet TAKE 1 TABLET BY MOUTH TWICE DAILY WITH MEALS    pantoprazole (PROTONIX) 40 MG EC tablet Take 1 tablet by mouth Daily.    potassium chloride 10 MEQ CR tablet Take 1 tablet by mouth twice daily    [DISCONTINUED] atorvastatin (LIPITOR) 20 MG tablet Take 1 tablet by mouth once daily    [DISCONTINUED] traZODone (DESYREL) 100 MG tablet TAKE 1 TABLET BY MOUTH ONCE DAILY AT BEDTIME    [DISCONTINUED] gabapentin (NEURONTIN) 100 MG capsule TAKE 1 CAPSULE BY MOUTH THREE TIMES DAILY (Patient not taking: Reported on 5/22/2024)     No  "current facility-administered medications on file prior to visit.       Immunization History   Administered Date(s) Administered    COVID-19 (MODERNA) 1st,2nd,3rd Dose Monovalent 02/22/2021, 03/15/2021    COVID-19 (MODERNA) Monovalent Original Booster 09/22/2022    COVID-19 (PFIZER) Purple Cap Monovalent 03/02/2021, 04/04/2021, 09/29/2021    COVID-19 F23 (MODERNA) 12YRS+ (SPIKEVAX) 10/03/2023    Covid-19 (Pfizer) Gray Cap Monovalent 05/02/2022    Flu Vaccine Intradermal Quad 18-64YR 09/10/2012, 08/29/2017    Fluzone High-Dose 65+yrs 09/29/2021, 09/21/2022, 10/03/2023    Hepatitis A 06/19/2018, 03/19/2019    Influenza Quad Vaccine (Inpatient) 10/13/2011, 09/10/2013, 08/27/2015, 09/01/2020    Influenza, Unspecified 09/30/2020    Pneumococcal Conjugate 20-Valent (PCV20) 09/21/2022    Pneumococcal Polysaccharide (PPSV23) 10/13/2011    Shingrix 03/19/2019, 08/09/2019    Tdap 03/19/2019    Zostavax 09/19/2015       Review of Systems   Constitutional:  Negative for fatigue and fever.   Respiratory:  Negative for cough and shortness of breath.    Cardiovascular:  Negative for chest pain, palpitations and leg swelling.   Musculoskeletal:  Positive for neck pain (since his myelitis).        Objective     Vitals:    05/22/24 0851   BP: 131/90   BP Location: Right arm   Patient Position: Sitting   Pulse: 85   Weight: 108 kg (238 lb 9.6 oz)   Height: 180.3 cm (71\")            Physical Exam  Vitals reviewed.   Constitutional:       General: He is not in acute distress.     Appearance: Normal appearance.   Neck:      Vascular: No carotid bruit.   Cardiovascular:      Rate and Rhythm: Normal rate and regular rhythm.      Heart sounds: Normal heart sounds. No murmur heard.  Pulmonary:      Effort: Pulmonary effort is normal. No respiratory distress.      Breath sounds: Normal breath sounds.   Musculoskeletal:      Right lower leg: No edema.      Left lower leg: No edema.   Neurological:      Mental Status: He is alert. "   Psychiatric:         Mood and Affect: Mood normal.         Behavior: Behavior normal.         Result Review :     The following data was reviewed by: ERUM Charles on 05/22/2024:                       Assessment and Plan      Diagnoses and all orders for this visit:    1. Mixed hyperlipidemia (Primary)  Assessment & Plan:   Continue current medication and efforts with diet and exercise.       Orders:  -     Comprehensive Metabolic Panel  -     Lipid Panel  -     atorvastatin (LIPITOR) 20 MG tablet; Take 1 tablet by mouth Daily.  Dispense: 90 tablet; Refill: 1    2. Insomnia, unspecified type  Assessment & Plan:  continue rx    Orders:  -     traZODone (DESYREL) 100 MG tablet; Take 1 tablet by mouth every night at bedtime.  Dispense: 90 tablet; Refill: 1    3. Essential hypertension  Assessment & Plan:  Advised to Continue to monitor BP at home. Continue current meds for now. Continue to modify diet and lifestyle.          4. Neck pain  Assessment & Plan:  continue to follow up with neurology as directed       5. Type 2 diabetes mellitus with hyperlipidemia  Assessment & Plan:   Keep follow up with ANYA Osman       6. Other pulmonary embolism without acute cor pulmonale, unspecified chronicity  Assessment & Plan:  Reviewed discharge summary on  at [facility], found the PE incidentally and was started on the eliquis, he then he was to follow up with the neuroimmunology clinic in 6 weeks; pt reports they don't manage that.  Will consult with MD braun length of treatment for his history of provoked PE after getting the neurology records.                       Follow Up     Return for followup pending lab results.    Patient was given instructions and counseling regarding his condition or for health maintenance advice. Please see specific information pulled into the AVS if appropriate.

## 2024-05-22 NOTE — ASSESSMENT & PLAN NOTE
Reviewed discharge summary on  at [facility], found the PE incidentally and was started on the eliquis, he then he was to follow up with the neuroimmunology clinic in 6 weeks; pt reports they don't manage that.  Will consult with MD braun length of treatment for his history of provoked PE after getting the neurology records.

## 2024-05-22 NOTE — Clinical Note
Can you contact neuorlogist DR Kim on his last visit with pt, I am looking for notes and can't find,

## 2024-05-22 NOTE — PROGRESS NOTES
I looked, maybe I did not give the update enough time to complete.  He was suppose to see him again around 4-8-24, did he?

## 2024-06-13 DIAGNOSIS — I10 ESSENTIAL (PRIMARY) HYPERTENSION: ICD-10-CM

## 2024-06-13 RX ORDER — DULAGLUTIDE 3 MG/.5ML
3 INJECTION, SOLUTION SUBCUTANEOUS
Qty: 2 ML | Refills: 5 | Status: SHIPPED | OUTPATIENT
Start: 2024-06-13 | End: 2024-06-17

## 2024-06-13 RX ORDER — DULAGLUTIDE 3 MG/.5ML
INJECTION, SOLUTION SUBCUTANEOUS
COMMUNITY
End: 2024-06-13 | Stop reason: SDUPTHER

## 2024-06-13 RX ORDER — CLONIDINE HYDROCHLORIDE 0.2 MG/1
0.1 TABLET ORAL EVERY 12 HOURS
Qty: 90 TABLET | Refills: 0 | Status: SHIPPED | OUTPATIENT
Start: 2024-06-13

## 2024-06-16 DIAGNOSIS — E11.65 UNCONTROLLED TYPE 2 DIABETES MELLITUS WITH HYPERGLYCEMIA: Primary | ICD-10-CM

## 2024-06-16 DIAGNOSIS — E11.42 DIABETIC POLYNEUROPATHY ASSOCIATED WITH TYPE 2 DIABETES MELLITUS: ICD-10-CM

## 2024-06-17 ENCOUNTER — TELEPHONE (OUTPATIENT)
Dept: DIABETES SERVICES | Facility: HOSPITAL | Age: 69
End: 2024-06-17
Payer: MEDICARE

## 2024-06-17 RX ORDER — DULAGLUTIDE 4.5 MG/.5ML
INJECTION, SOLUTION SUBCUTANEOUS
Qty: 12 ML | Refills: 0 | Status: SHIPPED | OUTPATIENT
Start: 2024-06-17

## 2024-06-17 NOTE — TELEPHONE ENCOUNTER
Caller: Maksim Acosta    Relationship to patient: Self    Best call back number: [phone]     Patient is needing: PATIENT IS DUE FOR A SHOT OF TRULICITY 4.5 TOMORROW BUT ALL THE PHARMACIES HE HAS CHECKED HAVE 3 BUT NO 4.5. PLEASE CALL TO ADVISE.

## 2024-06-17 NOTE — TELEPHONE ENCOUNTER
I called the pt. No answer. Was unable to leave voicemail, stated it was full.  Pt had a prescription of Trulicity 3 mg sent to [facility] pharmacy in [place] on 6/13/24, there was also a pharmacy request for Trulicity 4.5 mg by [facility] pharmacy in [place] that was sent today. Pt should be able to  either one that is available as both scripts are at that pharmacy.

## 2024-06-27 DIAGNOSIS — I10 ESSENTIAL HYPERTENSION: ICD-10-CM

## 2024-06-27 RX ORDER — LISINOPRIL 40 MG/1
40 TABLET ORAL DAILY
Qty: 90 TABLET | Refills: 0 | Status: SHIPPED | OUTPATIENT
Start: 2024-06-27

## 2024-07-19 DIAGNOSIS — I10 ESSENTIAL (PRIMARY) HYPERTENSION: ICD-10-CM

## 2024-07-19 RX ORDER — POTASSIUM CHLORIDE 750 MG/1
TABLET, FILM COATED, EXTENDED RELEASE ORAL
Qty: 180 TABLET | Refills: 0 | Status: SHIPPED | OUTPATIENT
Start: 2024-07-19

## 2024-07-23 RX ORDER — PANTOPRAZOLE SODIUM 40 MG/1
40 TABLET, DELAYED RELEASE ORAL DAILY
Qty: 90 TABLET | Refills: 0 | Status: SHIPPED | OUTPATIENT
Start: 2024-07-23

## 2024-08-12 DIAGNOSIS — E11.65 TYPE 2 DIABETES MELLITUS WITH HYPERGLYCEMIA, WITHOUT LONG-TERM CURRENT USE OF INSULIN: ICD-10-CM

## 2024-08-12 NOTE — TELEPHONE ENCOUNTER
Rx Refill Note  Requested Prescriptions     Pending Prescriptions Disp Refills    metFORMIN (GLUCOPHAGE) 1000 MG tablet [Pharmacy Med Name: metFORMIN HCl 1000 MG Oral Tablet] 180 tablet 0     Sig: TAKE 1 TABLET BY MOUTH TWICE DAILY WITH MEALS      Last office visit with prescribing clinician: 5/22/2024   Last telemedicine visit with prescribing clinician: Visit date not found   Next office visit with prescribing clinician: 9/26/2024  Antihyperglycemics Protocol Failed     Fabiola Sousa LPN  08/12/24, 11:56 EDT

## 2024-09-20 DIAGNOSIS — I10 ESSENTIAL (PRIMARY) HYPERTENSION: ICD-10-CM

## 2024-09-20 RX ORDER — CLONIDINE HYDROCHLORIDE 0.2 MG/1
0.1 TABLET ORAL EVERY 12 HOURS
Qty: 90 TABLET | Refills: 0 | Status: SHIPPED | OUTPATIENT
Start: 2024-09-20

## 2024-09-23 DIAGNOSIS — I10 ESSENTIAL HYPERTENSION: ICD-10-CM

## 2024-09-23 RX ORDER — LISINOPRIL 40 MG/1
40 TABLET ORAL DAILY
Qty: 90 TABLET | Refills: 0 | Status: SHIPPED | OUTPATIENT
Start: 2024-09-23

## 2024-10-06 DIAGNOSIS — E11.65 UNCONTROLLED TYPE 2 DIABETES MELLITUS WITH HYPERGLYCEMIA: ICD-10-CM

## 2024-10-08 ENCOUNTER — OFFICE VISIT (OUTPATIENT)
Dept: FAMILY MEDICINE CLINIC | Age: 69
End: 2024-10-08
Payer: MEDICARE

## 2024-10-08 VITALS
HEIGHT: 71 IN | WEIGHT: 239 LBS | HEART RATE: 91 BPM | SYSTOLIC BLOOD PRESSURE: 127 MMHG | DIASTOLIC BLOOD PRESSURE: 88 MMHG | BODY MASS INDEX: 33.46 KG/M2 | TEMPERATURE: 98.1 F

## 2024-10-08 DIAGNOSIS — E11.65 UNCONTROLLED TYPE 2 DIABETES MELLITUS WITH HYPERGLYCEMIA: ICD-10-CM

## 2024-10-08 DIAGNOSIS — F51.01 PRIMARY INSOMNIA: ICD-10-CM

## 2024-10-08 DIAGNOSIS — M54.2 NECK PAIN: ICD-10-CM

## 2024-10-08 DIAGNOSIS — I10 ESSENTIAL HYPERTENSION: Primary | ICD-10-CM

## 2024-10-08 DIAGNOSIS — Z23 IMMUNIZATION DUE: ICD-10-CM

## 2024-10-08 DIAGNOSIS — E11.69 TYPE 2 DIABETES MELLITUS WITH HYPERLIPIDEMIA: ICD-10-CM

## 2024-10-08 DIAGNOSIS — E78.2 MIXED HYPERLIPIDEMIA: ICD-10-CM

## 2024-10-08 DIAGNOSIS — E78.5 TYPE 2 DIABETES MELLITUS WITH HYPERLIPIDEMIA: ICD-10-CM

## 2024-10-08 PROCEDURE — 90662 IIV NO PRSV INCREASED AG IM: CPT | Performed by: NURSE PRACTITIONER

## 2024-10-08 PROCEDURE — 1160F RVW MEDS BY RX/DR IN RCRD: CPT | Performed by: NURSE PRACTITIONER

## 2024-10-08 PROCEDURE — G0008 ADMIN INFLUENZA VIRUS VAC: HCPCS | Performed by: NURSE PRACTITIONER

## 2024-10-08 PROCEDURE — 99214 OFFICE O/P EST MOD 30 MIN: CPT | Performed by: NURSE PRACTITIONER

## 2024-10-08 PROCEDURE — 1125F AMNT PAIN NOTED PAIN PRSNT: CPT | Performed by: NURSE PRACTITIONER

## 2024-10-08 PROCEDURE — 3079F DIAST BP 80-89 MM HG: CPT | Performed by: NURSE PRACTITIONER

## 2024-10-08 PROCEDURE — 3074F SYST BP LT 130 MM HG: CPT | Performed by: NURSE PRACTITIONER

## 2024-10-08 PROCEDURE — 3051F HG A1C>EQUAL 7.0%<8.0%: CPT | Performed by: NURSE PRACTITIONER

## 2024-10-08 PROCEDURE — 91320 SARSCV2 VAC 30MCG TRS-SUC IM: CPT | Performed by: NURSE PRACTITIONER

## 2024-10-08 PROCEDURE — 1159F MED LIST DOCD IN RCRD: CPT | Performed by: NURSE PRACTITIONER

## 2024-10-08 PROCEDURE — 90480 ADMN SARSCOV2 VAC 1/ONLY CMP: CPT | Performed by: NURSE PRACTITIONER

## 2024-10-08 RX ORDER — DULAGLUTIDE 4.5 MG/.5ML
INJECTION, SOLUTION SUBCUTANEOUS
Qty: 12 ML | Refills: 0 | OUTPATIENT
Start: 2024-10-08

## 2024-10-08 NOTE — TELEPHONE ENCOUNTER
Caller: BENJAMIN BOLAND    Relationship: SELF    Best call back number: [phone]    Requested Prescriptions:   Requested Prescriptions     Pending Prescriptions Disp Refills    Dulaglutide (Trulicity) 4.5 MG/0.5ML solution pen-injector 12 mL 0        Pharmacy where request should be sent: [facility] [address] [place] [address] LAURENCE HINTON [facility] - [phone]  - [phone] FX     Last office visit with prescribing clinician: 1/26/2024   Last telemedicine visit with prescribing clinician: Visit date not found   Next office visit with prescribing clinician: Visit date not found     Additional details provided by patient:     Does the patient have less than a 3 day supply:  [] Yes  [] No    Would you like a call back once the refill request has been completed: [] Yes [] No    If the office needs to give you a call back, can they leave a voicemail: [] Yes [] No    Teresa Haque Rep   10/08/24 12:03 EDT

## 2024-10-08 NOTE — PROGRESS NOTES
Chief Complaint  Hypertension (6 month follow up HTN, HLD, and insomnia )    Subjective          Maksim Acosta presents to [facility]    History of Present Illness  Hyperlipidemia  Current medication: lipitor   Tolerating medication: Yes   Needs Refill: no     Lab Results       Component                Value               Date                       CHOL                     151                 05/22/2024                 CHLPL                    122                 03/24/2021                 TRIG                     163 (H)             05/22/2024                 HDL                      56                  05/22/2024                 LDL                      68                  05/22/2024                Hypertension:  Current medication: norvasc, clonidine, lisinopril , K (off HCTZ)  Tolerating Medication: Yes  Checking BP at home and it is: 120/80   Needs refills: no   Labs:  Lab Results       Component                Value               Date                       GLUCOSE                  152 (H)             05/22/2024                 BUN                      8                   05/22/2024                 CREATININE               0.93                05/22/2024                 EGFRIFAFRI               81                  09/27/2021                 BCR                      8.6                 05/22/2024                 K                        3.7                 05/22/2024                 CO2                      27.3                05/22/2024                 CALCIUM                  10.0                05/22/2024                 ALBUMIN                  4.6                 05/22/2024                 LABIL2                   1.4                 03/24/2021                 AST                      12                  05/22/2024                 ALT                      14                  05/22/2024              Anxiety/ Depression/ Insomnia  Current medication: trazodone  Tolerating  medication: Yes   \Current symptoms: none, sleeping well, also takes a muscle relaxer   Refills needed: no    Diabetes:  Sees ANYA Osman, needs to reschedule his appt   Current medication: trulicity, farxiga, metformin   Tolerating medication: Yes  Last eye exam: goes to [facility]   Last foot exam: over a year ago   At home BS ranges: 130-140  Lab Results reports in the last month his A1c was 8        Component                Value               Date                       HGBA1C                   7.7 (A)             2024                  Past Medical History changes since 2024:      Hyperlipidemia    Hypertension     Sleep Apnea: (+) sleep study; uses CPAP;     Chronic Pain: affecting the low back;     Type 2 Diabetes: dx'd in ;     Glaucoma: dx'd in ;   PE      Hospitalizations:      2023: transverse myelitis/ seeing Dr Kim (developed a PE)               PREVENTIVE HEALTH MAINTENANCE             COLORECTAL CANCER SCREENING: Up to date (colonoscopy q10y; sigmoidoscopy q5y; Cologuard q3y) was last done 24 with diverticulosis and spastic colon, Dr Rushing  & 19, Results are in chart; colonoscopy with the following abnormalities noted-- Diverticulosis; 2014     Hepatitis C Medicare Screening: was last done 17; negative             CURRENT MEDICAL PROVIDERS:    Dentist: Dr. Cabrera    Neurologist:  was seeing Dr Yap/ now Judy     Ophthalmologist: [facility]     Pulmonologist: Dr. Fischer - [place]                   Surgical History:       Right shoulder surgery 2021 R rotator cuff repair  [facility]     Procedures:    Colonoscopy         Family History:        Father:  at age 83;  Dementia;  Type 2 Diabetes     Mother:  at age 51; Cause of death was CVA     Brother(s): 3 brother(s) total; 2 ;  Myocardial Infarction (  71 );  cancer of pharynx     Sister(s): 2 sister(s) total; 2 ;   at birth     Daughter(s): 2 daughter(s)  total         Social History:        Occupation: Disabled (due to low back pain)     Marital Status:      Children: 2 children                 Past Medical History:   Diagnosis Date    Acanthosis nigricans     Chronic pain     LOW BACK     Essential (primary) hypertension     Glaucoma     DX'D IN     H/O repair of right rotator cuff     3 months later a manipulation needed    Hyperlipidemia, unspecified     Hypokalemia     Insomnia, unspecified     Low back pain     Nicotine dependence, cigarettes, in remission     Obstructive sleep apnea (adult) (pediatric)     (+) SLEEP STUDY; USES CPAP    Other symptoms and signs involving cognitive functions and awareness     Pain in left leg     Pain in left thigh     Personal history of colonic polyps     Psoriasis, unspecified     Type 2 diabetes mellitus with hyperglycemia     DX'D IN     Vitamin D deficiency        No Known Allergies     Past Surgical History:   Procedure Laterality Date    COLONOSCOPY N/A 2024    diverticulosis and spastic colon /dr Rushing    ROTATOR CUFF REPAIR Right     SHOULDER ARTHROSCOPY Right 2021        Social History     Tobacco Use    Smoking status: Former     Current packs/day: 0.00     Average packs/day: 0.5 packs/day for 15.0 years (7.5 ttl pk-yrs)     Types: Cigarettes     Start date:      Quit date:      Years since quittin.7     Passive exposure: Never    Smokeless tobacco: Never   Substance Use Topics    Alcohol use: Never     Comment: NON-DRINKER       Family History   Problem Relation Age of Onset    Stroke Mother     Dementia Father     Diabetes type II Father     Heart attack Brother 71    Cancer Brother         PHARYNX        Health Maintenance Due   Topic Date Due    HEMOGLOBIN A1C  2024    URINE MICROALBUMIN  10/03/2024        Current Outpatient Medications on File Prior to Visit   Medication Sig    Accu-Chek Softclix Lancets lancets USE TO CHECK BLOOD SUGAR ONE TO TWO TIMES DAILY     acetaminophen (TYLENOL) 500 MG tablet Take 1 tablet by mouth Every 6 (Six) Hours As Needed for Mild Pain.    amLODIPine (NORVASC) 10 MG tablet Take 1 tablet by mouth once daily    atorvastatin (LIPITOR) 20 MG tablet Take 1 tablet by mouth Daily.    baclofen (LIORESAL) 10 MG tablet Take 1 tablet by mouth every night at bedtime.    cloNIDine (CATAPRES) 0.2 MG tablet TAKE 1/2 (ONE-HALF) TABLET BY MOUTH EVERY 12 HOURS    Combigan 0.2-0.5 % ophthalmic solution instill 1 drop into each eye twice daily    dapagliflozin Propanediol (Farxiga) 10 MG tablet Take 10 mg by mouth Daily for 180 days.    diclofenac (VOLTAREN) 0.1 % ophthalmic solution 1 drop 4 (Four) Times a Day.    dicyclomine (BENTYL) 20 MG tablet Take 1 tablet by mouth 3 times a day.    gabapentin (NEURONTIN) 300 MG capsule Take 1 capsule by mouth 3 (Three) Times a Day.    glucose blood (Accu-Chek Marisela Plus) test strip USE STRIP TO CHECK GLUCOSE ONCE OR TWICE DAILY    lisinopril (PRINIVIL,ZESTRIL) 40 MG tablet Take 1 tablet by mouth once daily    metFORMIN (GLUCOPHAGE) 1000 MG tablet Take 1 tablet by mouth 2 (Two) Times a Day With Meals. Appt with ONEIL aMrie 09/26/24.    potassium chloride 10 MEQ CR tablet Take 1 tablet by mouth twice daily    traZODone (DESYREL) 100 MG tablet Take 1 tablet by mouth every night at bedtime.    Trulicity 4.5 MG/0.5ML solution pen-injector INJECT 4.5 MG SUBCUTANEOUSLY INTO THE APPROPRIATE AREA AS DIRECTED EVERY 7 DAYS    [DISCONTINUED] Eliquis 5 MG tablet tablet Take 1 tablet by mouth Every 12 (Twelve) Hours. (Patient not taking: Reported on 10/8/2024)    [DISCONTINUED] pantoprazole (PROTONIX) 40 MG EC tablet Take 1 tablet by mouth once daily (Patient not taking: Reported on 10/8/2024)    [DISCONTINUED] RSVPreF3 Vac Recomb Adjuvanted (Arexvy) 120 MCG/0.5ML reconstituted suspension injection Inject  into the appropriate muscle as directed by prescriber. (Patient not taking: Reported on 10/8/2024)     No current facility-administered  "medications on file prior to visit.       Immunization History   Administered Date(s) Administered    Arexvy (RSV, Adults 60+ yrs) 05/22/2024    COVID-19 (MODERNA) 12YRS+ (SPIKEVAX) 10/03/2023    COVID-19 (MODERNA) 1st,2nd,3rd Dose Monovalent 02/22/2021, 03/15/2021    COVID-19 (MODERNA) Monovalent Original Booster 09/22/2022    COVID-19 (PFIZER) 12YRS+ (COMIRNATY) 10/08/2024    COVID-19 (PFIZER) Purple Cap Monovalent 03/02/2021, 04/04/2021, 09/29/2021    Covid-19 (Pfizer) Gray Cap Monovalent 05/02/2022    Flu Vaccine Intradermal Quad 18-64YR 09/10/2012, 08/29/2017    Fluzone  >6mos 10/13/2011, 09/10/2013, 08/27/2015, 09/01/2020    Fluzone High-Dose 65+YRS 10/08/2024    Fluzone High-Dose 65+yrs 09/29/2021, 09/21/2022, 10/03/2023    Hepatitis A 06/19/2018, 03/19/2019    Influenza, Unspecified 09/30/2020    Pneumococcal Conjugate 20-Valent (PCV20) 09/21/2022    Pneumococcal Polysaccharide (PPSV23) 10/13/2011    Shingrix 03/19/2019, 08/09/2019    Tdap 03/19/2019    Zostavax 09/19/2015       Review of Systems   Constitutional:  Negative for fatigue and fever.   Respiratory:  Negative for cough and shortness of breath.    Cardiovascular:  Negative for chest pain, palpitations and leg swelling.   Gastrointestinal:  Negative for blood in stool.        Has abd bloating sometimes    Musculoskeletal:  Positive for back pain (had a MRI of neck and head recently, has a follow up appt).        Objective     Vitals:    10/08/24 1326 10/08/24 1403   BP: 141/92 127/88   BP Location: Right arm Right arm   Patient Position: Sitting Sitting   Cuff Size: Large Adult Large Adult   Pulse: 89 91   Temp: 98.1 °F (36.7 °C)    TempSrc: Oral    Weight: 108 kg (239 lb)    Height: 180.3 cm (71\")             Physical Exam  Vitals reviewed.   Constitutional:       General: He is not in acute distress.     Appearance: Normal appearance.   Neck:      Vascular: No carotid bruit.   Cardiovascular:      Rate and Rhythm: Normal rate and regular rhythm. "      Pulses:           Posterior tibial pulses are 2+ on the right side and 2+ on the left side.      Heart sounds: Normal heart sounds. No murmur heard.  Pulmonary:      Effort: Pulmonary effort is normal. No respiratory distress.      Breath sounds: Normal breath sounds.   Musculoskeletal:         General: No swelling.      Right lower leg: No edema.      Left lower leg: No edema.   Feet:      Right foot:      Protective Sensation: 3 sites tested.  3 sites sensed.      Skin integrity: Skin integrity normal. No ulcer or blister.      Toenail Condition: Right toenails are normal.      Left foot:      Protective Sensation: 3 sites tested.  3 sites sensed.      Skin integrity: Skin integrity normal. No ulcer or blister.      Toenail Condition: Left toenails are normal.      Comments: Diabetic Foot Exam Performed and Monofilament Test Performed     Neurological:      Mental Status: He is alert.   Psychiatric:         Mood and Affect: Mood normal.         Behavior: Behavior normal.         Thought Content: Thought content normal.         Result Review :     The following data was reviewed by: ERUM Charles on 10/08/2024:                       Assessment and Plan      Diagnoses and all orders for this visit:    1. Essential hypertension (Primary)  Assessment & Plan:  Hypertension is stable. Continue to monitor BP at home. Continue current meds. Continue to modify diet and lifestyle. He ate approx 7 hours ago, sent on to lab       Orders:  -     Comprehensive Metabolic Panel    2. Mixed hyperlipidemia  Assessment & Plan:  Continue current medication and efforts with diet and exercise.       Orders:  -     Comprehensive Metabolic Panel  -     Lipid Panel    3. Primary insomnia  Assessment & Plan:  continue rx      4. Type 2 diabetes mellitus with hyperlipidemia  Assessment & Plan:   call and schedule his follow up with ANYA Osman, monitor feet daily and continue to monitor his sugars       5. Immunization  due  Assessment & Plan:  He wanted to go ahead and get updated with his flu and covid vaccines today     Orders:  -     Fluzone High-Dose 65+yrs (0178-2439)  -     COVID-19 (Pfizer) 12yrs+ (COMIRNATY)    6. Neck pain  Assessment & Plan:  Reviewed his MRI of brain and neck with him, keep upcoming appt with specialist                     Follow Up     Return for followup pending lab results.    Patient was given instructions and counseling regarding his condition or for health maintenance advice. Please see specific information pulled into the AVS if appropriate.

## 2024-10-08 NOTE — ASSESSMENT & PLAN NOTE
call and schedule his follow up with ANYA Osman, monitor feet daily and continue to monitor his sugars

## 2024-10-08 NOTE — ASSESSMENT & PLAN NOTE
Hypertension is stable. Continue to monitor BP at home. Continue current meds. Continue to modify diet and lifestyle. He ate approx 7 hours ago, sent on to lab

## 2024-10-09 ENCOUNTER — OFFICE VISIT (OUTPATIENT)
Dept: DIABETES SERVICES | Facility: CLINIC | Age: 69
End: 2024-10-09
Payer: MEDICARE

## 2024-10-09 VITALS
HEART RATE: 104 BPM | DIASTOLIC BLOOD PRESSURE: 91 MMHG | OXYGEN SATURATION: 96 % | WEIGHT: 239 LBS | SYSTOLIC BLOOD PRESSURE: 134 MMHG | HEIGHT: 71 IN | BODY MASS INDEX: 33.46 KG/M2

## 2024-10-09 DIAGNOSIS — E11.65 UNCONTROLLED TYPE 2 DIABETES MELLITUS WITH HYPERGLYCEMIA: Primary | ICD-10-CM

## 2024-10-09 DIAGNOSIS — N18.2 TYPE 2 DIABETES MELLITUS WITH STAGE 2 CHRONIC KIDNEY DISEASE, WITHOUT LONG-TERM CURRENT USE OF INSULIN: ICD-10-CM

## 2024-10-09 DIAGNOSIS — E66.9 OBESITY (BMI 30-39.9): ICD-10-CM

## 2024-10-09 DIAGNOSIS — E11.22 TYPE 2 DIABETES MELLITUS WITH STAGE 2 CHRONIC KIDNEY DISEASE, WITHOUT LONG-TERM CURRENT USE OF INSULIN: ICD-10-CM

## 2024-10-09 LAB
EXPIRATION DATE: ABNORMAL
HBA1C MFR BLD: 7.1 % (ref 4.5–5.7)
Lab: ABNORMAL

## 2024-10-09 RX ORDER — DULAGLUTIDE 4.5 MG/.5ML
4.5 INJECTION, SOLUTION SUBCUTANEOUS
Qty: 6 ML | Refills: 1 | Status: SHIPPED | OUTPATIENT
Start: 2024-10-09 | End: 2025-04-07

## 2024-10-09 RX ORDER — DULAGLUTIDE 4.5 MG/.5ML
INJECTION, SOLUTION SUBCUTANEOUS
Qty: 12 ML | Refills: 0 | OUTPATIENT
Start: 2024-10-09

## 2024-10-09 RX ORDER — DAPAGLIFLOZIN 10 MG/1
1 TABLET, FILM COATED ORAL DAILY
Qty: 90 TABLET | Refills: 1 | Status: SHIPPED | OUTPATIENT
Start: 2024-10-09 | End: 2025-04-07

## 2024-10-09 NOTE — PROGRESS NOTES
Chief Complaint  Diabetes    Referred By: CATHERINE Davila*    Subjective          Maksim Acosta presents to [facility] for diabetes medication management    History of Present Illness    Visit type:  follow-up  Diabetes type:  Type 2  Current diabetes status/concerns/issues:  He was last seen in January of this year  Other health concerns:  He has been diagnosed with diverticulitis  Current Diabetes symptoms:    Polyuria: No   Polydipsia: No   Polyphagia: No   Blurred vision: No   Excessive fatigue: No  Known Diabetes complications:  Neuro: None  Renal: Normal eGFR per current labs and Microalbuminuria - NEGATIVE  Eyes: No Retinopathy reported on current eye exam; Location: N/A  Amputation/Wounds: None  GI: None  Cardiovascular: Hypertension and hyperlipidemia  ED: Per patient              Other: None  Hypoglycemia:  None reported at this time  Hypoglycemia Symptoms:  No hypoglycemia at this time  Current diabetes treatment:  metformin 1000 mg twice a day and Trulicity 4.5 mg once weekly, Farxiga 10 mg once a day   Blood glucose device:  Meter  Blood glucose monitoring frequency:   every other day  Blood glucose range/average:   [id number]  Glucose Source: Patient Reported  Diet:  Limits high carb/sweet foods, Avoids sugary drinks  Activity/Exercise:  None    Past Medical History:   Diagnosis Date    Acanthosis nigricans     Chronic pain     LOW BACK     Essential (primary) hypertension     Glaucoma     DX'D IN 2009    H/O repair of right rotator cuff     3 months later a manipulation needed    Hyperlipidemia, unspecified     Hypokalemia     Insomnia, unspecified     Low back pain     Nicotine dependence, cigarettes, in remission     Obstructive sleep apnea (adult) (pediatric)     (+) SLEEP STUDY; USES CPAP    Other symptoms and signs involving cognitive functions and awareness     Pain in left leg     Pain in left thigh     Personal history of colonic polyps     Psoriasis,  unspecified     Type 2 diabetes mellitus with hyperglycemia     DX'D IN     Vitamin D deficiency      Past Surgical History:   Procedure Laterality Date    COLONOSCOPY N/A 2024    diverticulosis and spastic colon /dr Rushing    ROTATOR CUFF REPAIR Right     SHOULDER ARTHROSCOPY Right 2021     Family History   Problem Relation Age of Onset    Stroke Mother     Dementia Father     Diabetes type II Father     Heart attack Brother 71    Cancer Brother         PHARYNX     Social History     Socioeconomic History    Marital status:     Number of children: 2   Tobacco Use    Smoking status: Former     Current packs/day: 0.00     Average packs/day: 0.5 packs/day for 15.0 years (7.5 ttl pk-yrs)     Types: Cigarettes     Start date:      Quit date:      Years since quittin.7     Passive exposure: Never    Smokeless tobacco: Never   Vaping Use    Vaping status: Never Used   Substance and Sexual Activity    Alcohol use: Never     Comment: NON-DRINKER    Drug use: Not Currently     Types: Marijuana     Comment: stopped    Sexual activity: Defer     No Known Allergies    Current Outpatient Medications:     Accu-Chek Softclix Lancets lancets, USE TO CHECK BLOOD SUGAR ONE TO TWO TIMES DAILY, Disp: 100 each, Rfl: 0    acetaminophen (TYLENOL) 500 MG tablet, Take 1 tablet by mouth Every 6 (Six) Hours As Needed for Mild Pain., Disp: , Rfl:     amLODIPine (NORVASC) 10 MG tablet, Take 1 tablet by mouth once daily, Disp: 90 tablet, Rfl: 1    atorvastatin (LIPITOR) 20 MG tablet, Take 1 tablet by mouth Daily., Disp: 90 tablet, Rfl: 1    baclofen (LIORESAL) 10 MG tablet, Take 1 tablet by mouth every night at bedtime., Disp: , Rfl:     cloNIDine (CATAPRES) 0.2 MG tablet, TAKE 1/2 (ONE-HALF) TABLET BY MOUTH EVERY 12 HOURS, Disp: 90 tablet, Rfl: 0    Combigan 0.2-0.5 % ophthalmic solution, instill 1 drop into each eye twice daily, Disp: , Rfl:     dapagliflozin Propanediol (Farxiga) 10 MG tablet, Take 10 mg  "by mouth Daily for 180 days., Disp: 90 tablet, Rfl: 1    dicyclomine (BENTYL) 20 MG tablet, Take 1 tablet by mouth 3 times a day., Disp: , Rfl:     Dulaglutide (Trulicity) 4.5 MG/0.5ML solution pen-injector, Inject 0.5 mL under the skin into the appropriate area as directed Every 7 (Seven) Days for 180 days., Disp: 6 mL, Rfl: 1    gabapentin (NEURONTIN) 300 MG capsule, Take 1 capsule by mouth 3 (Three) Times a Day., Disp: , Rfl:     glucose blood (Accu-Chek Marisela Plus) test strip, USE STRIP TO CHECK GLUCOSE ONCE OR TWICE DAILY, Disp: 100 each, Rfl: 0    lisinopril (PRINIVIL,ZESTRIL) 40 MG tablet, Take 1 tablet by mouth once daily, Disp: 90 tablet, Rfl: 0    metFORMIN (GLUCOPHAGE) 1000 MG tablet, Take 1 tablet by mouth 2 (Two) Times a Day With Meals. Appt with ONEIL Marie 09/26/24., Disp: 180 tablet, Rfl: 1    potassium chloride 10 MEQ CR tablet, Take 1 tablet by mouth twice daily, Disp: 180 tablet, Rfl: 0    traZODone (DESYREL) 100 MG tablet, Take 1 tablet by mouth every night at bedtime., Disp: 90 tablet, Rfl: 1    diclofenac (VOLTAREN) 0.1 % ophthalmic solution, 1 drop 4 (Four) Times a Day. (Patient not taking: Reported on 10/9/2024), Disp: , Rfl:     Objective     Vitals:    10/09/24 1258   BP: 134/91   BP Location: Left arm   Patient Position: Sitting   Cuff Size: Large Adult   Pulse: 104   SpO2: 96%   Weight: 108 kg (239 lb)   Height: 180.3 cm (70.98\")   PainSc:   8     Body mass index is 33.35 kg/m².    Physical Exam  Constitutional:       Appearance: Normal appearance. He is obese.      Comments: Obesity (BMI 30 - 39.9) Pt Current BMI = 33.35    HENT:      Head: Normocephalic and atraumatic.      Right Ear: External ear normal.      Left Ear: External ear normal.      Nose: Nose normal.   Eyes:      Extraocular Movements: Extraocular movements intact.      Conjunctiva/sclera: Conjunctivae normal.   Pulmonary:      Effort: Pulmonary effort is normal.   Musculoskeletal:         General: Normal range of motion.    "   Cervical back: Normal range of motion.   Skin:     General: Skin is warm and dry.   Neurological:      General: No focal deficit present.      Mental Status: He is alert and oriented to person, place, and time. Mental status is at baseline.   Psychiatric:         Mood and Affect: Mood normal.         Behavior: Behavior normal.         Thought Content: Thought content normal.         Judgment: Judgment normal.             Result Review :   The following data was reviewed by: ERUM Davila on 10/09/2024:    Most Recent A1C          10/9/2024    13:20   HGBA1C Most Recent   Hemoglobin A1C 7.1        A1C Last 3 Results          1/26/2024    09:13 10/9/2024    13:20   HGBA1C Last 3 Results   Hemoglobin A1C 7.7  7.1      A1c collected in the office today is 7.1%, indicating Uncontrolled Type II diabetes.  This result is down from the prior result of 7.7% collected on 1/26/24       Creatinine   Date Value Ref Range Status   05/22/2024 0.93 0.76 - 1.27 mg/dL Final   04/01/2024 0.90 0.76 - 1.27 mg/dL Final     eGFR   Date Value Ref Range Status   05/22/2024 89.4 >60.0 mL/min/1.73 Final   04/01/2024 93.0 >60.0 mL/min/1.73 Final     Labs collected on 5/22/24 show Stage II mild (GFR = 60-89mL/min)      Total Cholesterol   Date Value Ref Range Status   05/22/2024 151 0 - 200 mg/dL Final   10/03/2023 147 0 - 200 mg/dL Final     Triglycerides   Date Value Ref Range Status   05/22/2024 163 (H) 0 - 150 mg/dL Final   10/03/2023 122 0 - 150 mg/dL Final     HDL Cholesterol   Date Value Ref Range Status   05/22/2024 56 40 - 60 mg/dL Final   10/03/2023 53 40 - 60 mg/dL Final     LDL Cholesterol    Date Value Ref Range Status   05/22/2024 68 0 - 100 mg/dL Final   10/03/2023 72 0 - 100 mg/dL Final     Lipid panel collected on 5/22/24 shows Hypertriglyceridemia            Assessment: The patient has had improvement in his A1c and is just above controlled status.  He was last seen in this office in January of this year.  He is  trying to watch his diet more closely.      Diagnoses and all orders for this visit:    1. Uncontrolled type 2 diabetes mellitus with hyperglycemia (Primary)  -     POC Glycosylated Hemoglobin (Hb A1C)  -     Dulaglutide (Trulicity) 4.5 MG/0.5ML solution pen-injector; Inject 0.5 mL under the skin into the appropriate area as directed Every 7 (Seven) Days for 180 days.  Dispense: 6 mL; Refill: 1  -     dapagliflozin Propanediol (Farxiga) 10 MG tablet; Take 10 mg by mouth Daily for 180 days.  Dispense: 90 tablet; Refill: 1  -     metFORMIN (GLUCOPHAGE) 1000 MG tablet; Take 1 tablet by mouth 2 (Two) Times a Day With Meals. Appt with A Marie 09/26/24.  Dispense: 180 tablet; Refill: 1    2. Type 2 diabetes mellitus with stage 2 chronic kidney disease, without long-term current use of insulin    3. Obesity (BMI 30-39.9)        Plan: No changes were made to the treatment plan today.  The patient will be scheduled for routine follow-up appointment.  He is to focus on his dietary strategies to help further reduce the A1c.    The patient will monitor his blood glucose levels once a day.  If he develops problematic hyperglycemia or hypoglycemia or adverse drug reactions, he will contact the office for further instructions.        Follow Up     Return in about 3 months (around 1/9/2025) for Medication Management.    Patient was given instructions and counseling regarding his condition or for health maintenance advice. Please see specific information pulled into the AVS if appropriate.     Debra Osman, ERUM  10/09/2024      Dictated Utilizing Dragon Dictation.  Please note that portions of this note were completed with a voice recognition program.  Part of this note may be an electronic transcription/translation of spoken language to printed text using the Dragon Dictation System.

## 2024-10-12 DIAGNOSIS — I10 ESSENTIAL (PRIMARY) HYPERTENSION: ICD-10-CM

## 2024-10-14 RX ORDER — AMLODIPINE BESYLATE 10 MG/1
TABLET ORAL
Qty: 90 TABLET | Refills: 1 | Status: SHIPPED | OUTPATIENT
Start: 2024-10-14

## 2024-10-16 RX ORDER — PANTOPRAZOLE SODIUM 40 MG/1
40 TABLET, DELAYED RELEASE ORAL DAILY
Qty: 90 TABLET | Refills: 1 | Status: SHIPPED | OUTPATIENT
Start: 2024-10-16

## 2024-10-16 NOTE — TELEPHONE ENCOUNTER
Rx Refill Note  Requested Prescriptions     Pending Prescriptions Disp Refills    pantoprazole (PROTONIX) 40 MG EC tablet [Pharmacy Med Name: Pantoprazole Sodium 40 MG Oral Tablet Delayed Release] 90 tablet 0     Sig: Take 1 tablet by mouth once daily      Last office visit with prescribing clinician: 10/8/2024   Last telemedicine visit with prescribing clinician: Visit date not found   Next office visit with prescribing clinician: 4/8/2025  Proton Pump Inhibitors Protocol Failed     Fabiola Sousa LPN  10/16/24, 08:57 EDT

## 2024-10-19 DIAGNOSIS — I10 ESSENTIAL (PRIMARY) HYPERTENSION: ICD-10-CM

## 2024-10-21 RX ORDER — POTASSIUM CHLORIDE 750 MG/1
TABLET, EXTENDED RELEASE ORAL
Qty: 180 TABLET | Refills: 0 | Status: SHIPPED | OUTPATIENT
Start: 2024-10-21

## 2024-11-06 ENCOUNTER — TELEPHONE (OUTPATIENT)
Dept: FAMILY MEDICINE CLINIC | Age: 69
End: 2024-11-06
Payer: MEDICARE

## 2024-11-13 ENCOUNTER — LAB (OUTPATIENT)
Dept: LAB | Facility: HOSPITAL | Age: 69
End: 2024-11-13
Payer: MEDICARE

## 2024-11-13 LAB
ALBUMIN SERPL-MCNC: 4.6 G/DL (ref 3.5–5.2)
ALBUMIN/GLOB SERPL: 1.6 G/DL
ALP SERPL-CCNC: 83 U/L (ref 39–117)
ALT SERPL W P-5'-P-CCNC: 10 U/L (ref 1–41)
ANION GAP SERPL CALCULATED.3IONS-SCNC: 13.2 MMOL/L (ref 5–15)
AST SERPL-CCNC: 14 U/L (ref 1–40)
BILIRUB SERPL-MCNC: 0.6 MG/DL (ref 0–1.2)
BUN SERPL-MCNC: 10 MG/DL (ref 8–23)
BUN/CREAT SERPL: 10.8 (ref 7–25)
CALCIUM SPEC-SCNC: 10.2 MG/DL (ref 8.6–10.5)
CHLORIDE SERPL-SCNC: 100 MMOL/L (ref 98–107)
CHOLEST SERPL-MCNC: 137 MG/DL (ref 0–200)
CO2 SERPL-SCNC: 27.8 MMOL/L (ref 22–29)
CREAT SERPL-MCNC: 0.93 MG/DL (ref 0.76–1.27)
EGFRCR SERPLBLD CKD-EPI 2021: 88.9 ML/MIN/1.73
GLOBULIN UR ELPH-MCNC: 2.8 GM/DL
GLUCOSE SERPL-MCNC: 151 MG/DL (ref 65–99)
HDLC SERPL-MCNC: 44 MG/DL (ref 40–60)
LDLC SERPL CALC-MCNC: 67 MG/DL (ref 0–100)
LDLC/HDLC SERPL: 1.42 {RATIO}
POTASSIUM SERPL-SCNC: 4.3 MMOL/L (ref 3.5–5.2)
PROT SERPL-MCNC: 7.4 G/DL (ref 6–8.5)
SODIUM SERPL-SCNC: 141 MMOL/L (ref 136–145)
TRIGL SERPL-MCNC: 152 MG/DL (ref 0–150)
VLDLC SERPL-MCNC: 26 MG/DL (ref 5–40)

## 2024-11-13 PROCEDURE — 80061 LIPID PANEL: CPT | Performed by: NURSE PRACTITIONER

## 2024-11-13 PROCEDURE — 80053 COMPREHEN METABOLIC PANEL: CPT | Performed by: NURSE PRACTITIONER

## 2024-12-28 DIAGNOSIS — I10 ESSENTIAL (PRIMARY) HYPERTENSION: ICD-10-CM

## 2024-12-30 RX ORDER — CLONIDINE HYDROCHLORIDE 0.2 MG/1
0.1 TABLET ORAL EVERY 12 HOURS
Qty: 90 TABLET | Refills: 0 | Status: SHIPPED | OUTPATIENT
Start: 2024-12-30

## 2024-12-31 DIAGNOSIS — I10 ESSENTIAL HYPERTENSION: ICD-10-CM

## 2024-12-31 RX ORDER — LISINOPRIL 40 MG/1
40 TABLET ORAL DAILY
Qty: 90 TABLET | Refills: 0 | Status: SHIPPED | OUTPATIENT
Start: 2024-12-31

## 2025-01-24 ENCOUNTER — TELEPHONE (OUTPATIENT)
Dept: FAMILY MEDICINE CLINIC | Age: 70
End: 2025-01-24
Payer: MEDICARE

## 2025-01-24 ENCOUNTER — OFFICE VISIT (OUTPATIENT)
Dept: DIABETES SERVICES | Facility: CLINIC | Age: 70
End: 2025-01-24
Payer: MEDICARE

## 2025-01-24 VITALS
SYSTOLIC BLOOD PRESSURE: 137 MMHG | HEART RATE: 92 BPM | HEIGHT: 71 IN | WEIGHT: 242.8 LBS | DIASTOLIC BLOOD PRESSURE: 97 MMHG | OXYGEN SATURATION: 98 % | BODY MASS INDEX: 33.99 KG/M2 | TEMPERATURE: 98 F

## 2025-01-24 DIAGNOSIS — E11.65 UNCONTROLLED TYPE 2 DIABETES MELLITUS WITH HYPERGLYCEMIA: Primary | ICD-10-CM

## 2025-01-24 DIAGNOSIS — E66.9 OBESITY (BMI 30-39.9): ICD-10-CM

## 2025-01-24 DIAGNOSIS — E11.22 TYPE 2 DIABETES MELLITUS WITH STAGE 2 CHRONIC KIDNEY DISEASE, WITHOUT LONG-TERM CURRENT USE OF INSULIN: ICD-10-CM

## 2025-01-24 DIAGNOSIS — N18.2 TYPE 2 DIABETES MELLITUS WITH STAGE 2 CHRONIC KIDNEY DISEASE, WITHOUT LONG-TERM CURRENT USE OF INSULIN: ICD-10-CM

## 2025-01-24 LAB
EXPIRATION DATE: ABNORMAL
HBA1C MFR BLD: 7.3 % (ref 4.5–5.7)
Lab: ABNORMAL

## 2025-01-24 PROCEDURE — 3075F SYST BP GE 130 - 139MM HG: CPT | Performed by: NURSE PRACTITIONER

## 2025-01-24 PROCEDURE — 99214 OFFICE O/P EST MOD 30 MIN: CPT | Performed by: NURSE PRACTITIONER

## 2025-01-24 PROCEDURE — 3051F HG A1C>EQUAL 7.0%<8.0%: CPT | Performed by: NURSE PRACTITIONER

## 2025-01-24 PROCEDURE — 3080F DIAST BP >= 90 MM HG: CPT | Performed by: NURSE PRACTITIONER

## 2025-01-24 PROCEDURE — 1160F RVW MEDS BY RX/DR IN RCRD: CPT | Performed by: NURSE PRACTITIONER

## 2025-01-24 PROCEDURE — 1159F MED LIST DOCD IN RCRD: CPT | Performed by: NURSE PRACTITIONER

## 2025-01-24 NOTE — TELEPHONE ENCOUNTER
Caller: Maksim Acosta    Relationship: Self    Best call back number: [phone]     What is the best time to reach you: ANYTIME     Who are you requesting to speak with (clinical staff, provider,  specific staff member): CLINICAL     What was the call regarding: PATIENT WAS TO CONTACT INSURANCE TO SEE IF FREDIANGYRO IS COVERED, PATIENT STATED THAT IT IS AND POSSIBLE THAT PA MAY BE NEEDED.

## 2025-01-24 NOTE — PROGRESS NOTES
Chief Complaint  Diabetes (Med management, A1C)    Referred By: No ref. provider found    Subjective          Patient or patient representative verbalized consent for the use of Ambient Listening during the visit with  ERUM Davila for chart documentation. 2/2/2025  11:15 DONNA Acosta presents to [facility] for diabetes medication management    History of Present Illness    Visit type:  follow-up  Diabetes type:  Type 2  Current diabetes status/concerns/issues:     History of Present Illness  The patient presents for evaluation of diabetes mellitus.    He reports no significant concerns related to his diabetes today. His current medication regimen includes metformin 1000 mg twice daily, Trulicity 4.5 mg once weekly, and Farxiga 10 mg daily. He monitors his blood glucose levels at home, with a reading of 121 this morning. He admits to not checking his blood glucose levels consistently every morning, but they typically range around 130. He has not experienced any hypoglycemic episodes characterized by symptoms such as shakiness or sweating. He maintains a careful diet, avoiding sugary beverages, and has been utilizing [employer] as a resource for dietary information. His physical activity is limited due to cold weather conditions, but he does engage in stair climbing as a form of exercise. He has experienced a weight gain of 3 pounds, which he attributes to dietary indiscretions during the holiday season. He consumes one cup of coffee daily and has expressed interest in trying Mounjaro, citing his daughter's successful weight loss of 65 pounds with this medication. H    He is experiencing issues with diverticulitis. He has bowel irregularities, alternating between constipation and diarrhea. He is under the care of a gastroenterologist who performed his colonoscopy.    He has a nerve disorder and a spinal cord injury in his neck, which were discovered during a hospital  stay last December. The cause of the spinal cord injury is unknown. He experiences numbness, tingling, and pain in his arms and hands, and has to be cautious with his . He experiences daily pain. He was hospitalized at the [facility] when these issues were discovered. He also has a bulging herniated disc in his lower back. L      Current Diabetes symptoms:    Polyuria: No   Polydipsia: No   Polyphagia: No   Blurred vision: No   Excessive fatigue: No  Known Diabetes complications:  Neuropathy: None; Location: N/A  Renal: No current urine microalbumin available and Stage II mild (GFR = 60-89 mL/min)  Eyes: No Retinopathy reported on current eye exam; Location: N/A  Amputation/Wounds: None  GI: None  Cardiovascular: Hypertension and Hyperlipidemia  ED: Patient Reported  Other: None  Hypoglycemia:  None reported at this time  Hypoglycemia Symptoms:  No hypoglycemia at this time  Current diabetes treatment:  metformin 1000 mg twice a day and Trulicity 4.5 mg once weekly, Farxiga 10 mg once a day   Blood glucose device:  Meter  Blood glucose monitoring frequency:  Random/occasional  Blood glucose range/average:   130's or lower  Glucose Source: Patient Reported  Diet:  Limits high carb/sweet foods, Avoids sugary drinks  Activity/Exercise:  None    Past Medical History:   Diagnosis Date    Acanthosis nigricans     Chronic pain     LOW BACK     Essential (primary) hypertension     Glaucoma     DX'D IN 2009    H/O repair of right rotator cuff     3 months later a manipulation needed    Hyperlipidemia, unspecified     Hypokalemia     Insomnia, unspecified     Low back pain     Nicotine dependence, cigarettes, in remission     Obstructive sleep apnea (adult) (pediatric)     (+) SLEEP STUDY; USES CPAP    Other symptoms and signs involving cognitive functions and awareness     Pain in left leg     Pain in left thigh     Personal history of colonic polyps     Psoriasis, unspecified     Type 2 diabetes  mellitus with hyperglycemia     DX'D IN     Vitamin D deficiency      Past Surgical History:   Procedure Laterality Date    COLONOSCOPY N/A 2024    diverticulosis and spastic colon /dr Rushing    ROTATOR CUFF REPAIR Right     SHOULDER ARTHROSCOPY Right 2021     Family History   Problem Relation Age of Onset    Stroke Mother     Dementia Father     Diabetes type II Father     Heart attack Brother 71    Cancer Brother         PHARYNX     Social History     Socioeconomic History    Marital status:     Number of children: 2   Tobacco Use    Smoking status: Former     Current packs/day: 0.00     Average packs/day: 0.5 packs/day for 15.0 years (7.5 ttl pk-yrs)     Types: Cigarettes     Start date:      Quit date:      Years since quittin.0     Passive exposure: Never    Smokeless tobacco: Never   Vaping Use    Vaping status: Never Used   Substance and Sexual Activity    Alcohol use: Never     Comment: NON-DRINKER    Drug use: Not Currently     Types: Marijuana     Comment: stopped    Sexual activity: Defer     No Known Allergies    Current Outpatient Medications:     Accu-Chek Softclix Lancets lancets, USE TO CHECK BLOOD SUGAR ONE TO TWO TIMES DAILY, Disp: 100 each, Rfl: 0    amLODIPine (NORVASC) 10 MG tablet, Take 1 tablet by mouth once daily, Disp: 90 tablet, Rfl: 1    atorvastatin (LIPITOR) 20 MG tablet, Take 1 tablet by mouth Daily., Disp: 90 tablet, Rfl: 1    baclofen (LIORESAL) 10 MG tablet, Take 1 tablet by mouth every night at bedtime., Disp: , Rfl:     cloNIDine (CATAPRES) 0.2 MG tablet, TAKE 1/2 (ONE-HALF) TABLET BY MOUTH EVERY 12 HOURS, Disp: 90 tablet, Rfl: 0    Combigan 0.2-0.5 % ophthalmic solution, instill 1 drop into each eye twice daily, Disp: , Rfl:     dapagliflozin Propanediol (Farxiga) 10 MG tablet, Take 10 mg by mouth Daily for 180 days., Disp: 90 tablet, Rfl: 1    diclofenac (VOLTAREN) 0.1 % ophthalmic solution, 1 drop 4 (Four) Times a Day., Disp: , Rfl:      "dicyclomine (BENTYL) 20 MG tablet, Take 1 tablet by mouth 3 times a day., Disp: , Rfl:     Dulaglutide (Trulicity) 4.5 MG/0.5ML solution pen-injector, Inject 0.5 mL under the skin into the appropriate area as directed Every 7 (Seven) Days for 180 days., Disp: 6 mL, Rfl: 1    gabapentin (NEURONTIN) 300 MG capsule, Take 1 capsule by mouth 3 (Three) Times a Day., Disp: , Rfl:     glucose blood (Accu-Chek Marisela Plus) test strip, USE STRIP TO CHECK GLUCOSE ONCE OR TWICE DAILY, Disp: 100 each, Rfl: 0    lisinopril (PRINIVIL,ZESTRIL) 40 MG tablet, Take 1 tablet by mouth once daily, Disp: 90 tablet, Rfl: 0    metFORMIN (GLUCOPHAGE) 1000 MG tablet, Take 1 tablet by mouth 2 (Two) Times a Day With Meals. Appt with A Marie 09/26/24., Disp: 180 tablet, Rfl: 1    pantoprazole (PROTONIX) 40 MG EC tablet, Take 1 tablet by mouth once daily, Disp: 90 tablet, Rfl: 1    potassium chloride 10 MEQ CR tablet, Take 1 tablet by mouth twice daily, Disp: 180 tablet, Rfl: 0    traZODone (DESYREL) 100 MG tablet, Take 1 tablet by mouth every night at bedtime., Disp: 90 tablet, Rfl: 1    acetaminophen (TYLENOL) 500 MG tablet, Take 1 tablet by mouth Every 6 (Six) Hours As Needed for Mild Pain. (Patient not taking: Reported on 1/24/2025), Disp: , Rfl:     Objective     Vitals:    01/24/25 1040   BP: 137/97   BP Location: Right arm   Patient Position: Sitting   Cuff Size: Adult   Pulse: 92   Temp: 98 °F (36.7 °C)   TempSrc: Temporal   SpO2: 98%   Weight: 110 kg (242 lb 12.8 oz)   Height: 180.3 cm (70.98\")     Body mass index is 33.88 kg/m².    Physical Exam  Constitutional:       Appearance: Normal appearance. He is obese.      Comments: Obesity (BMI 30 - 39.9) Pt Current BMI = 33.88    HENT:      Head: Normocephalic and atraumatic.      Right Ear: External ear normal.      Left Ear: External ear normal.      Nose: Nose normal.   Eyes:      Extraocular Movements: Extraocular movements intact.      Conjunctiva/sclera: Conjunctivae normal. "   Pulmonary:      Effort: Pulmonary effort is normal.   Musculoskeletal:         General: Normal range of motion.      Cervical back: Normal range of motion.   Skin:     General: Skin is warm and dry.   Neurological:      General: No focal deficit present.      Mental Status: He is alert and oriented to person, place, and time. Mental status is at baseline.   Psychiatric:         Mood and Affect: Mood normal.         Behavior: Behavior normal.         Thought Content: Thought content normal.         Judgment: Judgment normal.             Result Review :   The following data was reviewed by: ERUM Davila on 01/24/2025:    Most Recent A1C          1/24/2025    10:45   HGBA1C Most Recent   Hemoglobin A1C 7.3        A1C Last 3 Results          10/9/2024    13:20 1/24/2025    10:45   HGBA1C Last 3 Results   Hemoglobin A1C 7.1  7.3      A1c collected in the office today is 7.1%, indicating Uncontrolled Type II diabetes.  This result is up from the prior result of 7.3% collected on 10.9/24     Creatinine   Date Value Ref Range Status   11/13/2024 0.93 0.76 - 1.27 mg/dL Final   05/22/2024 0.93 0.76 - 1.27 mg/dL Final     eGFR   Date Value Ref Range Status   11/13/2024 88.9 >60.0 mL/min/1.73 Final   05/22/2024 89.4 >60.0 mL/min/1.73 Final     Labs collected on 11/13/24 show Stage II mild (GFR = 60-89mL/min)      Total Cholesterol   Date Value Ref Range Status   11/13/2024 137 0 - 200 mg/dL Final   05/22/2024 151 0 - 200 mg/dL Final     Triglycerides   Date Value Ref Range Status   11/13/2024 152 (H) 0 - 150 mg/dL Final   05/22/2024 163 (H) 0 - 150 mg/dL Final     HDL Cholesterol   Date Value Ref Range Status   11/13/2024 44 40 - 60 mg/dL Final   05/22/2024 56 40 - 60 mg/dL Final     LDL Cholesterol    Date Value Ref Range Status   11/13/2024 67 0 - 100 mg/dL Final   05/22/2024 68 0 - 100 mg/dL Final     Lipid panel collected on 11/13/24 shows Hypertriglyceridemia              Diagnoses and all orders for this  visit:    1. Uncontrolled type 2 diabetes mellitus with hyperglycemia (Primary)  -     POC Glycosylated Hemoglobin (Hb A1C)    2. Type 2 diabetes mellitus with stage 2 chronic kidney disease, without long-term current use of insulin    3. Obesity (BMI 30-39.9)        Assessment & Plan  1. Diabetes mellitus.  His A1c level has slightly increased from 7.1 in October 2024 to 7.3 currently. He is on metformin 1000 mg twice a day, Trulicity 4.5 mg once a week, and Farxiga 10 mg once a day. He reports no episodes of hypoglycemia. He has been advised to monitor his portion sizes, particularly for foods high in carbohydrates such as potatoes, rice, pasta, and starchy vegetables. He has been instructed to contact his insurance provider to inquire about coverage for medications other than Trulicity within the same drug class. He has been advised to request his ophthalmologist to forward a copy of his eye exam results to our office. He will continue his current medication regimen. He has been provided with sufficient refills for his medications. If his gastrointestinal symptoms subside and his blood sugar levels remain uncontrolled, a switch to Ozempic may be considered at the next appointment, pending insurance approval.    2. Diverticulitis.  He reports experiencing issues with diverticulitis, including alternating constipation and diarrhea. He has been advised to monitor his symptoms and maintain a balanced diet. If symptoms persist or worsen, further evaluation by a gastroenterologist may be necessary.    3. Spinal cord injury.  He has a history of spinal cord injury discovered last December, causing numbness, tingling, and pain in his arms and hands. He also reports a bulging herniated disc in his lower back.          The patient will monitor his blood glucose levels 1 time daily.  If he develops problematic hyperglycemia or hypoglycemia or adverse drug reactions, he will contact the office for further  instructions.        Follow Up     Return in about 3 months (around 4/24/2025) for Medication Management.    Patient was given instructions and counseling regarding his condition or for health maintenance advice. Please see specific information pulled into the AVS if appropriate.     Debra Osman, ERUM  01/24/2025        Dictated Utilizing Dragon Dictation.  Please note that portions of this note were completed with a voice recognition program.  Part of this note may be an electronic transcription/translation of spoken language to printed text using the Dragon Dictation System.

## 2025-02-01 DIAGNOSIS — I10 ESSENTIAL (PRIMARY) HYPERTENSION: ICD-10-CM

## 2025-02-03 RX ORDER — POTASSIUM CHLORIDE 750 MG/1
TABLET, EXTENDED RELEASE ORAL
Qty: 180 TABLET | Refills: 0 | OUTPATIENT
Start: 2025-02-03

## 2025-02-04 ENCOUNTER — OFFICE VISIT (OUTPATIENT)
Dept: FAMILY MEDICINE CLINIC | Age: 70
End: 2025-02-04
Payer: MEDICARE

## 2025-02-04 VITALS
OXYGEN SATURATION: 100 % | HEART RATE: 98 BPM | DIASTOLIC BLOOD PRESSURE: 90 MMHG | HEIGHT: 71 IN | BODY MASS INDEX: 34.27 KG/M2 | WEIGHT: 244.8 LBS | SYSTOLIC BLOOD PRESSURE: 132 MMHG | TEMPERATURE: 97.4 F

## 2025-02-04 DIAGNOSIS — E78.5 TYPE 2 DIABETES MELLITUS WITH HYPERLIPIDEMIA: Primary | ICD-10-CM

## 2025-02-04 DIAGNOSIS — R21 RASH: ICD-10-CM

## 2025-02-04 DIAGNOSIS — E11.69 TYPE 2 DIABETES MELLITUS WITH HYPERLIPIDEMIA: Primary | ICD-10-CM

## 2025-02-04 PROCEDURE — 3080F DIAST BP >= 90 MM HG: CPT | Performed by: NURSE PRACTITIONER

## 2025-02-04 PROCEDURE — 1159F MED LIST DOCD IN RCRD: CPT | Performed by: NURSE PRACTITIONER

## 2025-02-04 PROCEDURE — 1160F RVW MEDS BY RX/DR IN RCRD: CPT | Performed by: NURSE PRACTITIONER

## 2025-02-04 PROCEDURE — 3075F SYST BP GE 130 - 139MM HG: CPT | Performed by: NURSE PRACTITIONER

## 2025-02-04 PROCEDURE — 99213 OFFICE O/P EST LOW 20 MIN: CPT | Performed by: NURSE PRACTITIONER

## 2025-02-04 PROCEDURE — 1125F AMNT PAIN NOTED PAIN PRSNT: CPT | Performed by: NURSE PRACTITIONER

## 2025-02-04 PROCEDURE — 3051F HG A1C>EQUAL 7.0%<8.0%: CPT | Performed by: NURSE PRACTITIONER

## 2025-02-04 NOTE — ASSESSMENT & PLAN NOTE
I reviewed his labs and last note last week with ANYA Osman, I will fwd this note to ANYA Osman and see if she can send in Mounjaro, he reports he contacted his insurance and it is covered; he is scheduled to take his GLP 1 he is currently on tomorrow, go ahead and take and continue efforts with diet and exercise and follow up with ANYA Osman as directed

## 2025-02-04 NOTE — PROGRESS NOTES
Chief Complaint  Med Refill (Discuss changing his diabetes medication.)    Subjective          Maksim Acosta presents to [facility]    History of Present Illness  Diabetes:  Sees ANYA Osman  Current medication: trulicity, farxiga and metformin   Wants to try mournjaro, his daughter has been on this rx and has had more success with her weight loss, he is working on diet and regular exercise, he checked, his insurance will cover Moujaro  Tolerating medication: Yes  Pharmacy is [facility] and would like to switch GLP 1   At home BS ranges: 130 or less fasting   Lab Results       Component                Value               Date                       HGBA1C                   7.3 (A)             2025                Past Medical History changes since 2024:      Hyperlipidemia    Hypertension     Sleep Apnea: (+) sleep study; uses CPAP;     Chronic Pain: affecting the low back;     Type 2 Diabetes: dx'd in ;     Glaucoma: dx'd in ;   PE      Hospitalizations:      2023: transverse myelitis/ seeing Dr Kim (developed a PE)               PREVENTIVE HEALTH MAINTENANCE             COLORECTAL CANCER SCREENING: Up to date (colonoscopy q10y; sigmoidoscopy q5y; Cologuard q3y) was last done 24 with diverticulosis and spastic colon, Dr Rushing  & 19, Results are in chart; colonoscopy with the following abnormalities noted-- Diverticulosis; 2014     Hepatitis C Medicare Screening: was last done 17; negative             CURRENT MEDICAL PROVIDERS:    Dentist: Dr. Cabrera    Neurologist:  was seeing Dr Yap/ bindu Kim     Ophthalmologist: Johnny     Pulmonologist: Dr. Fischer - sleep                   Surgical History:       Right shoulder surgery 2021 R rotator cuff repair  Duber     Procedures:    Colonoscopy         Family History:        Father:  at age 83;  Dementia;  Type 2 Diabetes     Mother:  at age 51; Cause of  death was CVA     Brother(s): 3 brother(s) total; 2 ;  Myocardial Infarction (  71 );  cancer of pharynx     Sister(s): 2 sister(s) total; 2 ;   at birth     Daughter(s): 2 daughter(s) total         Social History:        Occupation: Disabled (due to low back pain)     Marital Status:      Children: 2 children                 Past Medical History:   Diagnosis Date    Acanthosis nigricans     Chronic pain     LOW BACK     Essential (primary) hypertension     Glaucoma     DX'D IN     H/O repair of right rotator cuff     3 months later a manipulation needed    Hyperlipidemia, unspecified     Hypokalemia     Insomnia, unspecified     Low back pain     Nicotine dependence, cigarettes, in remission     Obstructive sleep apnea (adult) (pediatric)     (+) SLEEP STUDY; USES CPAP    Other symptoms and signs involving cognitive functions and awareness     Pain in left leg     Pain in left thigh     Personal history of colonic polyps     Psoriasis, unspecified     Type 2 diabetes mellitus with hyperglycemia     DX'D IN     Vitamin D deficiency        No Known Allergies     Past Surgical History:   Procedure Laterality Date    COLONOSCOPY N/A 2024    diverticulosis and spastic colon /dr Rushing    ROTATOR CUFF REPAIR Right     SHOULDER ARTHROSCOPY Right 2021        Social History     Tobacco Use    Smoking status: Former     Current packs/day: 0.00     Average packs/day: 0.5 packs/day for 15.0 years (7.5 ttl pk-yrs)     Types: Cigarettes     Start date:      Quit date:      Years since quittin.1     Passive exposure: Never    Smokeless tobacco: Never   Substance Use Topics    Alcohol use: Never     Comment: NON-DRINKER       Family History   Problem Relation Age of Onset    Stroke Mother     Dementia Father     Diabetes type II Father     Heart attack Brother 71    Cancer Brother         PHARYNX        Health Maintenance Due   Topic Date Due    ANNUAL WELLNESS  VISIT  03/21/2025        Current Outpatient Medications on File Prior to Visit   Medication Sig    Accu-Chek Softclix Lancets lancets USE TO CHECK BLOOD SUGAR ONE TO TWO TIMES DAILY    acetaminophen (TYLENOL) 500 MG tablet Take 1 tablet by mouth Every 6 (Six) Hours As Needed for Mild Pain.    amLODIPine (NORVASC) 10 MG tablet Take 1 tablet by mouth once daily    atorvastatin (LIPITOR) 20 MG tablet Take 1 tablet by mouth Daily.    baclofen (LIORESAL) 10 MG tablet Take 1 tablet by mouth every night at bedtime.    cloNIDine (CATAPRES) 0.2 MG tablet TAKE 1/2 (ONE-HALF) TABLET BY MOUTH EVERY 12 HOURS    Combigan 0.2-0.5 % ophthalmic solution instill 1 drop into each eye twice daily    dapagliflozin Propanediol (Farxiga) 10 MG tablet Take 10 mg by mouth Daily for 180 days.    diclofenac (VOLTAREN) 0.1 % ophthalmic solution 1 drop 4 (Four) Times a Day.    dicyclomine (BENTYL) 20 MG tablet Take 1 tablet by mouth 3 times a day.    Dulaglutide (Trulicity) 4.5 MG/0.5ML solution pen-injector Inject 0.5 mL under the skin into the appropriate area as directed Every 7 (Seven) Days for 180 days.    gabapentin (NEURONTIN) 300 MG capsule Take 1 capsule by mouth 3 (Three) Times a Day.    glucose blood (Accu-Chek Marisela Plus) test strip USE STRIP TO CHECK GLUCOSE ONCE OR TWICE DAILY    lisinopril (PRINIVIL,ZESTRIL) 40 MG tablet Take 1 tablet by mouth once daily    metFORMIN (GLUCOPHAGE) 1000 MG tablet Take 1 tablet by mouth 2 (Two) Times a Day With Meals. Appt with ONEIL Marie 09/26/24.    pantoprazole (PROTONIX) 40 MG EC tablet Take 1 tablet by mouth once daily    potassium chloride 10 MEQ CR tablet Take 1 tablet by mouth twice daily    traZODone (DESYREL) 100 MG tablet Take 1 tablet by mouth every night at bedtime.     No current facility-administered medications on file prior to visit.       Immunization History   Administered Date(s) Administered    Arexvy (RSV, Adults 60+ yrs) 05/22/2024    COVID-19 (MODERNA) 12YRS+ (SPIKEVAX)  "10/03/2023    COVID-19 (MODERNA) 1st,2nd,3rd Dose Monovalent 02/22/2021, 03/15/2021    COVID-19 (MODERNA) Monovalent Original Booster 09/22/2022    COVID-19 (PFIZER) 12YRS+ (COMIRNATY) 10/08/2024    COVID-19 (PFIZER) Purple Cap Monovalent 03/02/2021, 04/04/2021, 09/29/2021    Covid-19 (Pfizer) Gray Cap Monovalent 05/02/2022    Flu Vaccine Intradermal Quad 18-64YR 09/10/2012, 08/29/2017    Fluzone  >6mos 10/13/2011, 09/10/2013, 08/27/2015, 09/01/2020    Fluzone High-Dose 65+YRS 10/08/2024    Fluzone High-Dose 65+yrs 09/29/2021, 09/21/2022, 10/03/2023    Hepatitis A 06/19/2018, 03/19/2019    Influenza, Unspecified 09/30/2020    Pneumococcal Conjugate 20-Valent (PCV20) 09/21/2022    Pneumococcal Polysaccharide (PPSV23) 10/13/2011    Shingrix 03/19/2019, 08/09/2019    Tdap 03/19/2019    Zostavax 09/19/2015       Review of Systems   Constitutional:  Negative for fatigue and fever.   Respiratory:  Negative for cough and shortness of breath.    Cardiovascular:  Negative for chest pain, palpitations and leg swelling.   Skin:  Positive for color change (dark spots and itching on his lower legs, asked about seeing derm).        Objective     Vitals:    02/04/25 0848   BP: 132/90   BP Location: Left arm   Patient Position: Sitting   Cuff Size: Adult   Pulse: 98   Temp: 97.4 °F (36.3 °C)   TempSrc: Oral   SpO2: 100%   Weight: 111 kg (244 lb 12.8 oz)   Height: 180.3 cm (70.98\")            Physical Exam  Vitals reviewed.   Constitutional:       General: He is not in acute distress.     Appearance: Normal appearance.   Neck:      Vascular: No carotid bruit.   Cardiovascular:      Rate and Rhythm: Normal rate and regular rhythm.      Heart sounds: Normal heart sounds. No murmur heard.  Pulmonary:      Effort: Pulmonary effort is normal. No respiratory distress.      Breath sounds: Normal breath sounds.   Musculoskeletal:      Right lower leg: No edema.      Left lower leg: No edema.   Skin:     Findings: Lesion (darker macular " brown patches scattered on lower ext bilaterally and a few excoriated areas) present.   Neurological:      Mental Status: He is alert.   Psychiatric:         Mood and Affect: Mood normal.         Behavior: Behavior normal.         Result Review :     The following data was reviewed by: ERUM Charles on 02/04/2025:                       Assessment and Plan      Diagnoses and all orders for this visit:    1. Type 2 diabetes mellitus with hyperlipidemia (Primary)  Assessment & Plan:   I reviewed his labs and last note last week with ANYA Osman, I will fwd this note to ANYA Osman and see if she can send in Mounjaro, he reports he contacted his insurance and it is covered; he is scheduled to take his GLP 1 he is currently on tomorrow, go ahead and take and continue efforts with diet and exercise and follow up with ANYA Osman as directed       2. Rash  -     Ambulatory Referral to Dermatology                    Follow Up     Return for Next scheduled follow up.    Patient was given instructions and counseling regarding his condition or for health maintenance advice. Please see specific information pulled into the AVS if appropriate.

## 2025-02-04 NOTE — Clinical Note
Pt saw me today to get rx for Mounjaro, he called his insurance and reports that GLP 1 is covered, he uses [facility], I told him I would fwd that request to you

## 2025-02-07 DIAGNOSIS — I10 ESSENTIAL (PRIMARY) HYPERTENSION: ICD-10-CM

## 2025-02-07 RX ORDER — POTASSIUM CHLORIDE 750 MG/1
TABLET, EXTENDED RELEASE ORAL
Qty: 180 TABLET | Refills: 0 | Status: SHIPPED | OUTPATIENT
Start: 2025-02-07

## 2025-02-12 DIAGNOSIS — G47.00 INSOMNIA, UNSPECIFIED TYPE: ICD-10-CM

## 2025-02-12 RX ORDER — TRAZODONE HYDROCHLORIDE 100 MG/1
100 TABLET ORAL
Qty: 90 TABLET | Refills: 0 | Status: SHIPPED | OUTPATIENT
Start: 2025-02-12

## 2025-03-08 DIAGNOSIS — E11.65 UNCONTROLLED TYPE 2 DIABETES MELLITUS WITH HYPERGLYCEMIA: ICD-10-CM

## 2025-03-10 RX ORDER — DULAGLUTIDE 4.5 MG/.5ML
INJECTION, SOLUTION SUBCUTANEOUS
Qty: 12 ML | Refills: 0 | Status: SHIPPED | OUTPATIENT
Start: 2025-03-10

## 2025-03-28 DIAGNOSIS — I10 ESSENTIAL (PRIMARY) HYPERTENSION: ICD-10-CM

## 2025-03-28 RX ORDER — CLONIDINE HYDROCHLORIDE 0.2 MG/1
0.1 TABLET ORAL EVERY 12 HOURS
Qty: 90 TABLET | Refills: 0 | Status: SHIPPED | OUTPATIENT
Start: 2025-03-28

## 2025-03-30 DIAGNOSIS — I10 ESSENTIAL HYPERTENSION: ICD-10-CM

## 2025-03-31 RX ORDER — LISINOPRIL 40 MG/1
40 TABLET ORAL DAILY
Qty: 90 TABLET | Refills: 0 | Status: SHIPPED | OUTPATIENT
Start: 2025-03-31

## 2025-04-08 ENCOUNTER — OFFICE VISIT (OUTPATIENT)
Dept: FAMILY MEDICINE CLINIC | Age: 70
End: 2025-04-08
Payer: COMMERCIAL

## 2025-04-08 ENCOUNTER — LAB (OUTPATIENT)
Dept: LAB | Facility: HOSPITAL | Age: 70
End: 2025-04-08
Payer: MEDICARE

## 2025-04-08 VITALS
DIASTOLIC BLOOD PRESSURE: 86 MMHG | HEART RATE: 104 BPM | BODY MASS INDEX: 35.06 KG/M2 | HEIGHT: 71 IN | TEMPERATURE: 98 F | SYSTOLIC BLOOD PRESSURE: 132 MMHG | OXYGEN SATURATION: 99 % | WEIGHT: 250.4 LBS

## 2025-04-08 DIAGNOSIS — E78.5 TYPE 2 DIABETES MELLITUS WITH HYPERLIPIDEMIA: ICD-10-CM

## 2025-04-08 DIAGNOSIS — G37.3 TRANSVERSE MYELITIS: ICD-10-CM

## 2025-04-08 DIAGNOSIS — E11.69 TYPE 2 DIABETES MELLITUS WITH HYPERLIPIDEMIA: ICD-10-CM

## 2025-04-08 DIAGNOSIS — Z00.00 ROUTINE GENERAL MEDICAL EXAMINATION AT A HEALTH CARE FACILITY: Primary | ICD-10-CM

## 2025-04-08 DIAGNOSIS — I10 ESSENTIAL HYPERTENSION: ICD-10-CM

## 2025-04-08 DIAGNOSIS — Z00.00 ROUTINE GENERAL MEDICAL EXAMINATION AT A HEALTH CARE FACILITY: ICD-10-CM

## 2025-04-08 DIAGNOSIS — Z12.5 SCREENING FOR PROSTATE CANCER: ICD-10-CM

## 2025-04-08 PROBLEM — M79.601 ARM PAIN, RIGHT: Status: RESOLVED | Noted: 2023-01-10 | Resolved: 2025-04-08

## 2025-04-08 PROBLEM — R42 DIZZINESS: Status: RESOLVED | Noted: 2021-12-14 | Resolved: 2025-04-08

## 2025-04-08 PROBLEM — I26.99 PULMONARY EMBOLISM: Status: RESOLVED | Noted: 2024-01-11 | Resolved: 2025-04-08

## 2025-04-08 LAB
ALBUMIN SERPL-MCNC: 4.5 G/DL (ref 3.5–5.2)
ALBUMIN UR-MCNC: 1.9 MG/DL
ALBUMIN/GLOB SERPL: 1.6 G/DL
ALP SERPL-CCNC: 78 U/L (ref 39–117)
ALT SERPL W P-5'-P-CCNC: 13 U/L (ref 1–41)
ANION GAP SERPL CALCULATED.3IONS-SCNC: 13.1 MMOL/L (ref 5–15)
AST SERPL-CCNC: 19 U/L (ref 1–40)
BASOPHILS # BLD AUTO: 0.03 10*3/MM3 (ref 0–0.2)
BASOPHILS NFR BLD AUTO: 0.5 % (ref 0–1.5)
BILIRUB SERPL-MCNC: 0.5 MG/DL (ref 0–1.2)
BUN SERPL-MCNC: 13 MG/DL (ref 8–23)
BUN/CREAT SERPL: 12.9 (ref 7–25)
CALCIUM SPEC-SCNC: 9.7 MG/DL (ref 8.6–10.5)
CHLORIDE SERPL-SCNC: 105 MMOL/L (ref 98–107)
CHOLEST SERPL-MCNC: 144 MG/DL (ref 0–200)
CO2 SERPL-SCNC: 23.9 MMOL/L (ref 22–29)
CREAT SERPL-MCNC: 1.01 MG/DL (ref 0.76–1.27)
CREAT UR-MCNC: 99.5 MG/DL
DEPRECATED RDW RBC AUTO: 39.4 FL (ref 37–54)
EGFRCR SERPLBLD CKD-EPI 2021: 80.5 ML/MIN/1.73
EOSINOPHIL # BLD AUTO: 0.52 10*3/MM3 (ref 0–0.4)
EOSINOPHIL NFR BLD AUTO: 8.1 % (ref 0.3–6.2)
ERYTHROCYTE [DISTWIDTH] IN BLOOD BY AUTOMATED COUNT: 12.8 % (ref 12.3–15.4)
GLOBULIN UR ELPH-MCNC: 2.9 GM/DL
GLUCOSE SERPL-MCNC: 180 MG/DL (ref 65–99)
HCT VFR BLD AUTO: 47.1 % (ref 37.5–51)
HDLC SERPL-MCNC: 47 MG/DL (ref 40–60)
HGB BLD-MCNC: 15.5 G/DL (ref 13–17.7)
IMM GRANULOCYTES # BLD AUTO: 0.01 10*3/MM3 (ref 0–0.05)
IMM GRANULOCYTES NFR BLD AUTO: 0.2 % (ref 0–0.5)
LDLC SERPL CALC-MCNC: 71 MG/DL (ref 0–100)
LDLC/HDLC SERPL: 1.43 {RATIO}
LYMPHOCYTES # BLD AUTO: 1.42 10*3/MM3 (ref 0.7–3.1)
LYMPHOCYTES NFR BLD AUTO: 22.2 % (ref 19.6–45.3)
MCH RBC QN AUTO: 27.8 PG (ref 26.6–33)
MCHC RBC AUTO-ENTMCNC: 32.9 G/DL (ref 31.5–35.7)
MCV RBC AUTO: 84.4 FL (ref 79–97)
MICROALBUMIN/CREAT UR: 19.1 MG/G (ref 0–29)
MONOCYTES # BLD AUTO: 0.34 10*3/MM3 (ref 0.1–0.9)
MONOCYTES NFR BLD AUTO: 5.3 % (ref 5–12)
NEUTROPHILS NFR BLD AUTO: 4.09 10*3/MM3 (ref 1.7–7)
NEUTROPHILS NFR BLD AUTO: 63.7 % (ref 42.7–76)
PLATELET # BLD AUTO: 231 10*3/MM3 (ref 140–450)
PMV BLD AUTO: 9.5 FL (ref 6–12)
POTASSIUM SERPL-SCNC: 3.6 MMOL/L (ref 3.5–5.2)
PROT SERPL-MCNC: 7.4 G/DL (ref 6–8.5)
PSA SERPL-MCNC: 0.54 NG/ML (ref 0–4)
RBC # BLD AUTO: 5.58 10*6/MM3 (ref 4.14–5.8)
SODIUM SERPL-SCNC: 142 MMOL/L (ref 136–145)
TRIGL SERPL-MCNC: 149 MG/DL (ref 0–150)
VLDLC SERPL-MCNC: 26 MG/DL (ref 5–40)
WBC NRBC COR # BLD AUTO: 6.41 10*3/MM3 (ref 3.4–10.8)

## 2025-04-08 PROCEDURE — 80061 LIPID PANEL: CPT

## 2025-04-08 PROCEDURE — 80053 COMPREHEN METABOLIC PANEL: CPT

## 2025-04-08 PROCEDURE — 36415 COLL VENOUS BLD VENIPUNCTURE: CPT

## 2025-04-08 PROCEDURE — G0103 PSA SCREENING: HCPCS

## 2025-04-08 PROCEDURE — 82043 UR ALBUMIN QUANTITATIVE: CPT

## 2025-04-08 PROCEDURE — 85025 COMPLETE CBC W/AUTO DIFF WBC: CPT

## 2025-04-08 PROCEDURE — 82570 ASSAY OF URINE CREATININE: CPT

## 2025-04-08 NOTE — ASSESSMENT & PLAN NOTE
Advise regular exercise, healthy eating, always wear seat belts. Living will discussed, booklet given and advised to complete, return copy to our office, fall prevention discussed.  Immunizations discussed, he is UTD.   To continue yearly optometry and dental exams.

## 2025-04-08 NOTE — ASSESSMENT & PLAN NOTE
continue to monitor his sugars, keep follow up appt with ANYA Osman, not due his A1C yet, checking other labs

## 2025-04-08 NOTE — ASSESSMENT & PLAN NOTE
Hypertension is stable. Continue to monitor BP at home. Continue current meds. Continue to modify diet and lifestyle. He is fasting today, to reduce salt I his diet and elevate legs when sitting

## 2025-04-08 NOTE — PROGRESS NOTES
Subjective   The ABCs of the Annual Wellness Visit  Medicare Wellness Visit      Maksim Acosta is a 69 y.o. patient who presents for a Medicare Wellness Visit.    Medicare wellness HPI  Exercises regularly:yes  Eats healthy:yes   Last PSA: normal one year ago   Wears seatbelts:yes   Living will:no, booklet given   Optometrist:ANDIA Turner   [facility]   Tobacco: none   Alcohol intake:none   Drugs:none   Falls:no  Colonoscopy: 4-29-24  Immunizations:UTD, covid, flu, rsv, tdap, pneumonia and shingrex    The following portions of the patient's history were reviewed and   updated as appropriate: allergies, current medications, past family history, past medical history, past social history, past surgical history, and problem list.    Compared to one year ago, the patient's physical   health is worse. Due pain and stiffness in joints   Compared to one year ago, the patient's mental   health is the same.    Recent Hospitalizations:  He was not admitted to the hospital during the last year.     Current Medical Providers:  Patient Care Team:  Yolanda Marie APRN as PCP - General (Family Medicine)    Outpatient Medications Prior to Visit   Medication Sig Dispense Refill    Accu-Chek Softclix Lancets lancets USE TO CHECK BLOOD SUGAR ONE TO TWO TIMES DAILY 100 each 0    acetaminophen (TYLENOL) 500 MG tablet Take 1 tablet by mouth Every 6 (Six) Hours As Needed for Mild Pain.      amLODIPine (NORVASC) 10 MG tablet Take 1 tablet by mouth once daily 90 tablet 1    atorvastatin (LIPITOR) 20 MG tablet Take 1 tablet by mouth Daily. 90 tablet 1    baclofen (LIORESAL) 10 MG tablet Take 1 tablet by mouth every night at bedtime.      cloNIDine (CATAPRES) 0.2 MG tablet TAKE 1/2 (ONE-HALF) TABLET BY MOUTH EVERY 12 HOURS 90 tablet 0    Combigan 0.2-0.5 % ophthalmic solution instill 1 drop into each eye twice daily      dapagliflozin Propanediol (Farxiga) 10 MG tablet Take 10 mg by mouth Daily for 180 days. 90  tablet 1    diclofenac (VOLTAREN) 0.1 % ophthalmic solution 1 drop 4 (Four) Times a Day.      dicyclomine (BENTYL) 20 MG tablet Take 1 tablet by mouth 3 times a day.      gabapentin (NEURONTIN) 300 MG capsule Take 1 capsule by mouth 3 (Three) Times a Day.      glucose blood (Accu-Chek Marisela Plus) test strip USE STRIP TO CHECK GLUCOSE ONCE OR TWICE DAILY 100 each 0    lisinopril (PRINIVIL,ZESTRIL) 40 MG tablet Take 1 tablet by mouth once daily 90 tablet 0    metFORMIN (GLUCOPHAGE) 1000 MG tablet Take 1 tablet by mouth 2 (Two) Times a Day With Meals. Appt with A Cynthia 09/26/24. 180 tablet 1    pantoprazole (PROTONIX) 40 MG EC tablet Take 1 tablet by mouth once daily 90 tablet 1    potassium chloride 10 MEQ CR tablet Take 1 tablet by mouth twice daily 180 tablet 0    traZODone (DESYREL) 100 MG tablet TAKE 1 TABLET BY MOUTH EVERY DAY AT BEDTIME 90 tablet 0    Trulicity 4.5 MG/0.5ML solution auto-injector INJECT 0.5 ML UNDER THE SKIN INTO THE APPROPRIATE AREA AS DIRECTED EVERY 7 DAYS 12 mL 0     No facility-administered medications prior to visit.     No opioid medication identified on active medication list. I have reviewed chart for other potential  high risk medication/s and harmful drug interactions in the elderly.      Aspirin is not on active medication list.  Aspirin use is not indicated based on review of current medical condition/s. Risk of harm outweighs potential benefits.  .    Patient Active Problem List   Diagnosis    Mixed hyperlipidemia    Essential hypertension    Type 2 diabetes mellitus with hyperlipidemia    Encounter for general adult medical examination with abnormal findings    Insomnia    Numbness and tingling    Suspected condition    Body mass index (BMI) of 36.0-36.9 in adult    Chronic hypokalemia    Chronic midline low back pain without sciatica    Dental caries    Glaucoma due to type 2 diabetes mellitus    Mild cognitive impairment    Morbid (severe) obesity due to excess calories    JOYCE  "on CPAP    Immunization due    Neck pain    Cervical stenosis of spine    Transverse myelitis    Routine general medical examination at a health care facility    Screening for prostate cancer    Screen for colon cancer     Advance Care Planning Advance Directive is not on file.  ACP discussion was held with the patient during this visit. Patient does not have an advance directive, information provided.            Objective   Vitals:    25 0823 25 0847   BP: 143/86 132/86  Comment: manual bp   BP Location: Left arm Right arm   Patient Position: Sitting Sitting   Cuff Size:  Adult   Pulse: 109 104   Temp: 98 °F (36.7 °C)    TempSrc: Oral    SpO2: 99%  Comment: on RA    Weight: 114 kg (250 lb 6.4 oz)    Height: 180.3 cm (71\")    PainSc: 8     PainLoc: Generalized        Estimated body mass index is 34.92 kg/m² as calculated from the following:    Height as of this encounter: 180.3 cm (71\").    Weight as of this encounter: 114 kg (250 lb 6.4 oz).    BMI is >= 30 and <35. (Class 1 Obesity). The following options were offered after discussion;: exercise counseling/recommendations and nutrition counseling/recommendations           Does the patient have evidence of cognitive impairment? No  Lab Results   Component Value Date    HGBA1C 7.3 (A) 2025                                                                                                Health  Risk Assessment    Smoking Status:  Social History     Tobacco Use   Smoking Status Former    Current packs/day: 0.00    Average packs/day: 0.5 packs/day for 15.0 years (7.5 ttl pk-yrs)    Types: Cigarettes    Start date:     Quit date: 2012    Years since quittin.2    Passive exposure: Never   Smokeless Tobacco Never     Alcohol Consumption:  Social History     Substance and Sexual Activity   Alcohol Use Never    Comment: NON-DRINKER       Fall Risk Screen  STEADI Fall Risk Assessment was completed, and patient is at LOW risk for falls.Assessment " completed on:2025    Depression Screening   Little interest or pleasure in doing things? Not at all   Feeling down, depressed, or hopeless? Not at all   PHQ-2 Total Score 0      Health Habits and Functional and Cognitive Screenin/8/2025     8:21 AM   Functional & Cognitive Status   Do you have difficulty preparing food and eating? No   Do you have difficulty bathing yourself, getting dressed or grooming yourself? No   Do you have difficulty using the toilet? No   Do you have difficulty moving around from place to place? No   Do you have trouble with steps or getting out of a bed or a chair? No   Current Diet Well Balanced Diet   Dental Exam Up to date   Eye Exam Up to date   Exercise (times per week) 7 times per week   Current Exercises Include Stair Step Machine;Walking   Do you need help using the phone?  No   Are you deaf or do you have serious difficulty hearing?  No   Do you need help to go to places out of walking distance? No   Do you need help shopping? No   Do you need help preparing meals?  No   Do you need help with housework?  No   Do you need help with laundry? No   Do you need help taking your medications? No   Do you need help managing money? No   Do you ever drive or ride in a car without wearing a seat belt? No   Have you felt unusual stress, anger or loneliness in the last month? No   Who do you live with? Alone   If you need help, do you have trouble finding someone available to you? No   Have you been bothered in the last four weeks by sexual problems? No   Do you have difficulty concentrating, remembering or making decisions? No           Age-appropriate Screening Schedule:  Refer to the list below for future screening recommendations based on patient's age, sex and/or medical conditions. Orders for these recommended tests are listed in the plan section. The patient has been provided with a written plan.    Health Maintenance List  Health Maintenance   Topic Date Due    URINE  MICROALBUMIN-CREATININE RATIO (uACR)  05/07/2021    COVID-19 Vaccine (10 - 2024-25 season) 09/02/2025 (Originally 4/8/2025)    INFLUENZA VACCINE  07/01/2025    HEMOGLOBIN A1C  07/24/2025    DIABETIC FOOT EXAM  10/08/2025    LIPID PANEL  11/13/2025    DIABETIC EYE EXAM  01/28/2026    ANNUAL WELLNESS VISIT  04/08/2026    TDAP/TD VACCINES (2 - Td or Tdap) 03/19/2029    COLORECTAL CANCER SCREENING  04/29/2029    HEPATITIS C SCREENING  Completed    Pneumococcal Vaccine 50+  Completed    AAA SCREEN ONCE  Completed    ZOSTER VACCINE  Completed                                                                                                                                                Gait and Balance Evaluation: Normal  CMS Preventative Services Quick Reference  Risk Factors Identified During Encounter  Immunizations Discussed/Encouraged:  none, UTD     The above risks/problems have been discussed with the patient.  Pertinent information has been shared with the patient in the After Visit Summary.  An After Visit Summary and PPPS were made available to the patient.    Follow Up:   Next Medicare Wellness visit to be scheduled in 1 year.          Additional E&M Note during same encounter follows:  Patient has multiple medical problems which are significant and separately identifiable that require additional work above and beyond the Medicare Wellness Visit.      Chief Complaint  Medicare Wellness-subsequent    Maksim Acosta is a 69 y.o. male who presents to [facility]     Hypertension:  Current medication: norvasc, clonidine, lisinopril   Tolerating Medication: Yes  Checking BP at home and it is: 125/86, not had his rx this am and rushed to get here   Needs refills: no  Labs:  Lab Results       Component                Value               Date                       GLUCOSE                  151 (H)             11/13/2024                 BUN                      10                  11/13/2024                  CREATININE               0.93                2024                 EGFRIFAFRI               81                  2021                 BCR                      10.8                2024                 K                        4.3                 2024                 CO2                      27.8                2024                 CALCIUM                  10.2                2024                 ALBUMIN                  4.6                 2024                 AST                      14                  2024                 ALT                      10                  2024              Past Medical History changes since 2025:      Hyperlipidemia    Hypertension     Sleep Apnea: (+) sleep study; uses CPAP;     Chronic Pain: affecting the low back;     Type 2 Diabetes: dx'd in ;     Glaucoma: dx'd in ;   PE      Hospitalizations:      2023: transverse myelitis/ seeing Dr Kim (developed a PE)               PREVENTIVE HEALTH MAINTENANCE             COLORECTAL CANCER SCREENING: Up to date (colonoscopy q10y; sigmoidoscopy q5y; Cologuard q3y) was last done 24 with diverticulosis and spastic colon, Dr Rushing  & 19, Results are in chart; colonoscopy with the following abnormalities noted-- Diverticulosis; 2014     Hepatitis C Medicare Screening: was last done 17; negative             CURRENT MEDICAL PROVIDERS:    Dentist: Dr. Cabrera    Neurologist:  was seeing Dr Yap/ bindu Kim     Ophthalmologist: Johnny     Pulmonologist: Dr. Fischer - [place]                   Surgical History:       Right shoulder surgery 2021 R rotator cuff repair  Duber     Procedures:    Colonoscopy         Family History:        Father:  at age 83;  Dementia;  Type 2 Diabetes, heart disease     Mother:  at age 51; Cause of death was CVA     Brother(s): 3 brother(s) total; 2 ;  Myocardial  "Infarction (  71 );  cancer of pharynx , cancer prostate    Sister(s): 2 sister(s) total; 2 ;   at birth     Daughter(s): 2 daughter(s) total         Social History:        Occupation: Disabled (due to low back pain)     Marital Status:      Children: 2 children              Objective   Vital Signs:   Vitals:    25 0823 25 0847   BP: 143/86 132/86  Comment: manual bp   BP Location: Left arm Right arm   Patient Position: Sitting Sitting   Cuff Size:  Adult   Pulse: 109 104   Temp: 98 °F (36.7 °C)    TempSrc: Oral    SpO2: 99%  Comment: on RA    Weight: 114 kg (250 lb 6.4 oz)    Height: 180.3 cm (71\")    PainSc: 8     PainLoc: Generalized      Body mass index is 34.92 kg/m².    Wt Readings from Last 3 Encounters:   25 114 kg (250 lb 6.4 oz)   25 111 kg (244 lb 12.8 oz)   25 110 kg (242 lb 12.8 oz)     BP Readings from Last 3 Encounters:   25 132/86   25 132/90   25 137/97       Review of Systems   Constitutional:  Negative for activity change, appetite change, chills, fatigue and fever.   HENT:  Negative for congestion and hearing loss.    Eyes:  Negative for visual disturbance.   Respiratory:  Negative for cough, shortness of breath and wheezing.    Cardiovascular:  Negative for chest pain, palpitations and leg swelling.   Gastrointestinal:  Negative for abdominal pain, blood in stool, constipation, diarrhea, nausea and vomiting.   Genitourinary:  Negative for difficulty urinating.   Musculoskeletal:  Positive for arthralgias and back pain (chronic).        Joint stiffness    Skin:  Positive for rash (better, he canceled derm appt, using gold balm on legs has helped).   Neurological:  Negative for dizziness, weakness and numbness.   Psychiatric/Behavioral:  Negative for confusion, sleep disturbance and suicidal ideas.         Physical Exam  Vitals reviewed.   Constitutional:       Appearance: Normal appearance. He is well-developed.   HENT:      " Head: Normocephalic and atraumatic.      Right Ear: External ear normal.      Left Ear: External ear normal.      Mouth/Throat:      Pharynx: No oropharyngeal exudate.   Eyes:      Conjunctiva/sclera: Conjunctivae normal.      Pupils: Pupils are equal, round, and reactive to light.   Cardiovascular:      Rate and Rhythm: Normal rate and regular rhythm.      Heart sounds: No murmur heard.     No friction rub. No gallop.   Pulmonary:      Effort: Pulmonary effort is normal.      Breath sounds: Normal breath sounds. No wheezing or rhonchi.   Abdominal:      General: Bowel sounds are normal. There is no distension.      Palpations: Abdomen is soft.      Tenderness: There is no abdominal tenderness.      Comments:       Musculoskeletal:      Right lower leg: Edema (1 +) present.      Left lower leg: Edema (1+) present.   Skin:     General: Skin is warm and dry.   Neurological:      Mental Status: He is alert and oriented to person, place, and time.      Cranial Nerves: No cranial nerve deficit.   Psychiatric:         Mood and Affect: Mood and affect normal.         Behavior: Behavior normal.         Thought Content: Thought content normal.         Judgment: Judgment normal.         The following data was reviewed by ERUM Charles on 04/08/2025        Assessment & Plan   Diagnoses and all orders for this visit:    1. Routine general medical examination at a health care facility (Primary)  Assessment & Plan:  Advise regular exercise, healthy eating, always wear seat belts. Living will discussed, booklet given and advised to complete, return copy to our office, fall prevention discussed.  Immunizations discussed, he is UTD.   To continue yearly optometry and dental exams.        Orders:  -     PSA SCREENING; Future  -     Cancel: Basic metabolic panel; Future  -     CBC w AUTO Differential; Future  -     Comprehensive Metabolic Panel; Future  -     Lipid Panel; Future    2. Screening for prostate  cancer  Assessment & Plan:  Discussed and will screen with PSA     Orders:  -     PSA SCREENING; Future    3. Essential hypertension  Assessment & Plan:  Hypertension is stable. Continue to monitor BP at home. Continue current meds. Continue to modify diet and lifestyle. He is fasting today, to reduce salt I his diet and elevate legs when sitting       Orders:  -     Cancel: Basic metabolic panel; Future  -     Comprehensive Metabolic Panel; Future    4. Transverse myelitis  Assessment & Plan:  Sees neurology every 3 months       5. Type 2 diabetes mellitus with hyperlipidemia  Assessment & Plan:   continue to monitor his sugars, keep follow up appt with ANYA Osman, not due his A1C yet, checking other labs     Orders:  -     Comprehensive Metabolic Panel; Future  -     Lipid Panel; Future  -     Microalbumin / Creatinine Urine Ratio - Urine, Clean Catch; Future          BMI is >= 30 and <35. (Class 1 Obesity). The following options were offered after discussion;: exercise counseling/recommendations and nutrition counseling/recommendations       FOLLOW UP  Return for followup pending lab results.  Patient was given instructions and counseling regarding his condition or for health maintenance advice. Please see specific information pulled into the AVS if appropriate.     Yolanda Marie, APRN  04/08/25  08:51 EDT

## 2025-04-10 DIAGNOSIS — I10 ESSENTIAL (PRIMARY) HYPERTENSION: ICD-10-CM

## 2025-04-10 RX ORDER — AMLODIPINE BESYLATE 10 MG/1
10 TABLET ORAL DAILY
Qty: 90 TABLET | Refills: 0 | Status: SHIPPED | OUTPATIENT
Start: 2025-04-10

## 2025-04-10 RX ORDER — PANTOPRAZOLE SODIUM 40 MG/1
40 TABLET, DELAYED RELEASE ORAL DAILY
Qty: 90 TABLET | Refills: 0 | Status: SHIPPED | OUTPATIENT
Start: 2025-04-10

## 2025-04-10 NOTE — TELEPHONE ENCOUNTER
Rx Refill Note  Requested Prescriptions     Pending Prescriptions Disp Refills    amLODIPine (NORVASC) 10 MG tablet [Pharmacy Med Name: amLODIPine Besylate 10 MG Oral Tablet] 90 tablet 0     Sig: Take 1 tablet by mouth once daily    pantoprazole (PROTONIX) 40 MG EC tablet [Pharmacy Med Name: Pantoprazole Sodium 40 MG Oral Tablet Delayed Release] 90 tablet 0     Sig: Take 1 tablet by mouth once daily      Last office visit with prescribing clinician: 4/8/2025   Last telemedicine visit with prescribing clinician: Visit date not found   Next office visit with prescribing clinician: Visit date not found  Calcium-Channel Blockers Protocol Failed     Fabiola Sousa LPN  04/10/25, 09:34 EDT

## 2025-05-08 DIAGNOSIS — I10 ESSENTIAL (PRIMARY) HYPERTENSION: ICD-10-CM

## 2025-05-08 RX ORDER — POTASSIUM CHLORIDE 750 MG/1
10 TABLET, EXTENDED RELEASE ORAL 2 TIMES DAILY
Qty: 180 TABLET | Refills: 1 | Status: SHIPPED | OUTPATIENT
Start: 2025-05-08

## 2025-05-08 NOTE — PROGRESS NOTES
Chief Complaint  Diabetes (Med management, A1C, discuss possibility of changing to Mounjaro )    Referred By: No ref. provider found    Subjective          Patient or patient representative verbalized consent for the use of Ambient Listening during the visit with  ERUM Davila for chart documentation. 5/9/2025  08:59 EDT    Maksim Acosta presents to [facility] for diabetes medication management    History of Present Illness    Visit type:  follow-up  Diabetes type:  Type 2  Current diabetes status/concerns/issues:     History of Present Illness  The patient presents for evaluation of diabetes mellitus.    The primary reason for this visit is to discuss transitioning from Trulicity to Mounjaro. He has been informed that his insurance will cover the cost of Mounjaro. His weight has increased by about 7 pounds since the last visit. He is currently on metformin 1000 mg twice daily, Farxiga 10 mg once daily, and Trulicity 4.5 mg weekly. He is not monitoring his blood glucose levels at home due to a shortage of test strips. He incorporates stair climbing into his daily routine, residing on the fourth floor, and occasionally walks. No new health concerns are reported.      Current Diabetes symptoms:    Polyuria: No   Polydipsia: No   Polyphagia: No   Blurred vision: No   Excessive fatigue: No  Known Diabetes complications:  Neuropathy: None; Location: N/A  Renal: Stage II mild (GFR = 60-89 mL/min) and Microalbuminuria - NEGATIVE  Eyes: No Retinopathy reported on current eye exam; Location: N/A; Date of Last Exam: 1/28/25  Amputation/Wounds: None  GI: None  Cardiovascular: Hypertension and Hyperlipidemia  ED: Patient Reported  Other: None  Hypoglycemia:  None reported at this time  Hypoglycemia Symptoms:  No hypoglycemia at this time  Current diabetes treatment:  metformin 1000 mg twice a day and Trulicity 4.5 mg once weekly, Farxiga 10 mg once a day   Blood glucose device:  Not  currently monitoring BG  Blood glucose monitoring frequency:  Not currently monitoring BG  Blood glucose range/average:  Not currently monitoring BG  Glucose Source: N/A  Diet:  Limits high carb/sweet foods, Avoids sugary drinks  Activity/Exercise:   walking and steps    Past Medical History:   Diagnosis Date    Acanthosis nigricans     Chronic pain     LOW BACK     Essential (primary) hypertension     Glaucoma     DX'D IN     H/O repair of right rotator cuff     3 months later a manipulation needed    Hyperlipidemia, unspecified     Hypokalemia     Insomnia, unspecified     Low back pain     Nicotine dependence, cigarettes, in remission     Obstructive sleep apnea (adult) (pediatric)     (+) SLEEP STUDY; USES CPAP    Other symptoms and signs involving cognitive functions and awareness     Pain in left leg     Pain in left thigh     Personal history of colonic polyps     Psoriasis, unspecified     Type 2 diabetes mellitus with hyperglycemia     DX'D IN     Vitamin D deficiency      Past Surgical History:   Procedure Laterality Date    COLONOSCOPY N/A 2024    diverticulosis and spastic colon /dr Rushing    ROTATOR CUFF REPAIR Right     SHOULDER ARTHROSCOPY Right 2021     Family History   Problem Relation Age of Onset    Stroke Mother     Dementia Father     Diabetes type II Father     Heart attack Brother 71    Cancer Brother         PHARYNX     Social History     Socioeconomic History    Marital status:     Number of children: 2   Tobacco Use    Smoking status: Former     Current packs/day: 0.00     Average packs/day: 0.5 packs/day for 15.0 years (7.5 ttl pk-yrs)     Types: Cigarettes     Start date:      Quit date:      Years since quittin.3     Passive exposure: Never    Smokeless tobacco: Never   Vaping Use    Vaping status: Never Used   Substance and Sexual Activity    Alcohol use: Never     Comment: NON-DRINKER    Drug use: Not Currently     Types: Marijuana     Comment:  stopped    Sexual activity: Defer     No Known Allergies    Current Outpatient Medications:     Accu-Chek Softclix Lancets lancets, Use as instructed, Disp: 100 each, Rfl: 1    acetaminophen (TYLENOL) 500 MG tablet, Take 1 tablet by mouth Every 6 (Six) Hours As Needed for Mild Pain., Disp: , Rfl:     amLODIPine (NORVASC) 10 MG tablet, Take 1 tablet by mouth once daily, Disp: 90 tablet, Rfl: 0    atorvastatin (LIPITOR) 20 MG tablet, Take 1 tablet by mouth Daily., Disp: 90 tablet, Rfl: 1    baclofen (LIORESAL) 10 MG tablet, Take 1 tablet by mouth every night at bedtime., Disp: , Rfl:     cloNIDine (CATAPRES) 0.2 MG tablet, TAKE 1/2 (ONE-HALF) TABLET BY MOUTH EVERY 12 HOURS, Disp: 90 tablet, Rfl: 0    Combigan 0.2-0.5 % ophthalmic solution, instill 1 drop into each eye twice daily, Disp: , Rfl:     dapagliflozin Propanediol (Farxiga) 10 MG tablet, Take 10 mg by mouth Daily for 180 days., Disp: 90 tablet, Rfl: 1    diclofenac (VOLTAREN) 0.1 % ophthalmic solution, 1 drop 4 (Four) Times a Day., Disp: , Rfl:     dicyclomine (BENTYL) 20 MG tablet, Take 1 tablet by mouth 3 times a day., Disp: , Rfl:     gabapentin (NEURONTIN) 300 MG capsule, Take 1 capsule by mouth 3 (Three) Times a Day., Disp: , Rfl:     glucose blood (Accu-Chek Marisela Plus) test strip, Use as instructed, Disp: 100 each, Rfl: 1    lisinopril (PRINIVIL,ZESTRIL) 40 MG tablet, Take 1 tablet by mouth once daily, Disp: 90 tablet, Rfl: 0    metFORMIN (GLUCOPHAGE) 1000 MG tablet, Take 1 tablet by mouth 2 (Two) Times a Day With Meals. Appt with ONEIL Marie 09/26/24., Disp: 180 tablet, Rfl: 1    pantoprazole (PROTONIX) 40 MG EC tablet, Take 1 tablet by mouth once daily, Disp: 90 tablet, Rfl: 0    potassium chloride 10 MEQ CR tablet, Take 1 tablet by mouth twice daily, Disp: 180 tablet, Rfl: 1    traZODone (DESYREL) 100 MG tablet, TAKE 1 TABLET BY MOUTH EVERY DAY AT BEDTIME, Disp: 90 tablet, Rfl: 0    Tirzepatide (Mounjaro) 10 MG/0.5ML solution auto-injector, Inject 10  "mg under the skin into the appropriate area as directed Every 7 (Seven) Days., Disp: 2 mL, Rfl: 5    Objective     Vitals:    05/09/25 0847   BP: 123/85   BP Location: Right arm   Patient Position: Sitting   Cuff Size: Adult   Pulse: 96   SpO2: 99%   Weight: 113 kg (249 lb)   Height: 180.3 cm (71\")     Body mass index is 34.73 kg/m².    Physical Exam  Constitutional:       Appearance: Normal appearance. He is obese.      Comments: Obesity (BMI 30 - 39.9) Pt Current BMI = 34.73    HENT:      Head: Normocephalic and atraumatic.      Right Ear: External ear normal.      Left Ear: External ear normal.      Nose: Nose normal.   Eyes:      Extraocular Movements: Extraocular movements intact.      Conjunctiva/sclera: Conjunctivae normal.   Pulmonary:      Effort: Pulmonary effort is normal.   Musculoskeletal:         General: Normal range of motion.      Cervical back: Normal range of motion.   Skin:     General: Skin is warm and dry.   Neurological:      General: No focal deficit present.      Mental Status: He is alert and oriented to person, place, and time. Mental status is at baseline.   Psychiatric:         Mood and Affect: Mood normal.         Behavior: Behavior normal.         Thought Content: Thought content normal.         Judgment: Judgment normal.             Result Review :   The following data was reviewed by: ERUM Davila on 05/09/2025:    Most Recent A1C          5/9/2025    08:56   HGBA1C Most Recent   Hemoglobin A1C 8.5        A1C Last 3 Results          10/9/2024    13:20 1/24/2025    10:45 5/9/2025    08:56   HGBA1C Last 3 Results   Hemoglobin A1C 7.1  7.3  8.5      A1c collected in the office today is 8.5%, indicating Uncontrolled Type II diabetes.  This result is up from the prior result of 7.3% collected on 8.5       Creatinine   Date Value Ref Range Status   04/08/2025 1.01 0.76 - 1.27 mg/dL Final   11/13/2024 0.93 0.76 - 1.27 mg/dL Final     eGFR   Date Value Ref Range Status "   04/08/2025 80.5 >60.0 mL/min/1.73 Final   11/13/2024 88.9 >60.0 mL/min/1.73 Final     Labs collected on 4/8/25 show Stage II mild (GFR = 60-89mL/min)    Microalbumin, Urine   Date Value Ref Range Status   04/08/2025 1.9 mg/dL Final   10/03/2023 <1.2 mg/dL Final     Creatinine, Urine   Date Value Ref Range Status   04/08/2025 99.5 mg/dL Final   10/03/2023 47.3 mg/dL Final     Microalbumin/Creatinine Ratio   Date Value Ref Range Status   04/08/2025 19.1 0.0 - 29.0 mg/g Final   10/03/2023   Final     Comment:     Unable to calculate     Urine microalbuminuria collected on 4/8/25 is negative for microalbuminuria    Total Cholesterol   Date Value Ref Range Status   04/08/2025 144 0 - 200 mg/dL Final   11/13/2024 137 0 - 200 mg/dL Final     Triglycerides   Date Value Ref Range Status   04/08/2025 149 0 - 150 mg/dL Final   11/13/2024 152 (H) 0 - 150 mg/dL Final     HDL Cholesterol   Date Value Ref Range Status   04/08/2025 47 40 - 60 mg/dL Final   11/13/2024 44 40 - 60 mg/dL Final     LDL Cholesterol    Date Value Ref Range Status   04/08/2025 71 0 - 100 mg/dL Final   11/13/2024 67 0 - 100 mg/dL Final     Lipid panel collected on 4/8/25 shows normal lipid panel              Diagnoses and all orders for this visit:    1. Uncontrolled type 2 diabetes mellitus with hyperglycemia (Primary)  -     POC Glycosylated Hemoglobin (Hb A1C)  -     Tirzepatide (Mounjaro) 10 MG/0.5ML solution auto-injector; Inject 10 mg under the skin into the appropriate area as directed Every 7 (Seven) Days.  Dispense: 2 mL; Refill: 5  -     dapagliflozin Propanediol (Farxiga) 10 MG tablet; Take 10 mg by mouth Daily for 180 days.  Dispense: 90 tablet; Refill: 1  -     metFORMIN (GLUCOPHAGE) 1000 MG tablet; Take 1 tablet by mouth 2 (Two) Times a Day With Meals. Appt with ONEIL Marie 09/26/24.  Dispense: 180 tablet; Refill: 1  -     glucose blood (Accu-Chek Marisela Plus) test strip; Use as instructed  Dispense: 100 each; Refill: 1  -     Accu-Chek  Softclix Lancets lancets; Use as instructed  Dispense: 100 each; Refill: 1    2. Type 2 diabetes mellitus with stage 2 chronic kidney disease, without long-term current use of insulin    3. Obesity (BMI 30-39.9)        Assessment & Plan  1. Diabetes Mellitus: Not at treatment goal  - Weight has increased by approximately 7 pounds since the last visit.  - A1c level has escalated from 7.3 in 01/2025 to 8.5 currently.  - Discussed transitioning to Mounjaro 10 mg due to inadequate control with Trulicity at  maximum dose; advised to monitor for gastrointestinal side effects such as nausea, vomiting, diarrhea, or constipation.  - Prescription for Mounjaro 10 mg will be sent to the pharmacy, along with refills for Farxiga and metformin, each with a 90-day supply. Additionally, a prescription for Accu-Chek test strips and lancets will be provided. If insurance does not approve Mounjaro, an alternative plan involving Ozempic will be discussed.          The patient will monitor his blood glucose levels 1 time daily.  If he develops problematic hyperglycemia or hypoglycemia or adverse drug reactions, he will contact the office for further instructions.        Follow Up     Return in about 3 months (around 8/9/2025) for Medication Management.    Patient was given instructions and counseling regarding his condition or for health maintenance advice. Please see specific information pulled into the AVS if appropriate.     Debra Osman, APRN  05/09/2025        Dictated Utilizing Dragon Dictation.  Please note that portions of this note were completed with a voice recognition program.  Part of this note may be an electronic transcription/translation of spoken language to printed text using the Dragon Dictation System.

## 2025-05-09 ENCOUNTER — OFFICE VISIT (OUTPATIENT)
Dept: DIABETES SERVICES | Facility: CLINIC | Age: 70
End: 2025-05-09
Payer: COMMERCIAL

## 2025-05-09 VITALS
DIASTOLIC BLOOD PRESSURE: 85 MMHG | OXYGEN SATURATION: 99 % | HEART RATE: 96 BPM | HEIGHT: 71 IN | SYSTOLIC BLOOD PRESSURE: 123 MMHG | BODY MASS INDEX: 34.86 KG/M2 | WEIGHT: 249 LBS

## 2025-05-09 DIAGNOSIS — E11.22 TYPE 2 DIABETES MELLITUS WITH STAGE 2 CHRONIC KIDNEY DISEASE, WITHOUT LONG-TERM CURRENT USE OF INSULIN: ICD-10-CM

## 2025-05-09 DIAGNOSIS — E11.65 UNCONTROLLED TYPE 2 DIABETES MELLITUS WITH HYPERGLYCEMIA: Primary | ICD-10-CM

## 2025-05-09 DIAGNOSIS — E66.9 OBESITY (BMI 30-39.9): ICD-10-CM

## 2025-05-09 DIAGNOSIS — N18.2 TYPE 2 DIABETES MELLITUS WITH STAGE 2 CHRONIC KIDNEY DISEASE, WITHOUT LONG-TERM CURRENT USE OF INSULIN: ICD-10-CM

## 2025-05-09 LAB
EXPIRATION DATE: ABNORMAL
HBA1C MFR BLD: 8.5 % (ref 4.5–5.7)
Lab: ABNORMAL

## 2025-05-09 PROCEDURE — 1159F MED LIST DOCD IN RCRD: CPT | Performed by: NURSE PRACTITIONER

## 2025-05-09 PROCEDURE — 99214 OFFICE O/P EST MOD 30 MIN: CPT | Performed by: NURSE PRACTITIONER

## 2025-05-09 PROCEDURE — 3074F SYST BP LT 130 MM HG: CPT | Performed by: NURSE PRACTITIONER

## 2025-05-09 PROCEDURE — 3052F HG A1C>EQUAL 8.0%<EQUAL 9.0%: CPT | Performed by: NURSE PRACTITIONER

## 2025-05-09 PROCEDURE — 1160F RVW MEDS BY RX/DR IN RCRD: CPT | Performed by: NURSE PRACTITIONER

## 2025-05-09 PROCEDURE — 3079F DIAST BP 80-89 MM HG: CPT | Performed by: NURSE PRACTITIONER

## 2025-05-09 RX ORDER — TIRZEPATIDE 10 MG/.5ML
10 INJECTION, SOLUTION SUBCUTANEOUS
Qty: 2 ML | Refills: 5 | Status: SHIPPED | OUTPATIENT
Start: 2025-05-09

## 2025-05-09 RX ORDER — LANCETS
EACH MISCELLANEOUS
Qty: 100 EACH | Refills: 1 | Status: SHIPPED | OUTPATIENT
Start: 2025-05-09

## 2025-05-09 RX ORDER — BLOOD SUGAR DIAGNOSTIC
STRIP MISCELLANEOUS
Qty: 100 EACH | Refills: 1 | Status: SHIPPED | OUTPATIENT
Start: 2025-05-09

## 2025-05-09 RX ORDER — DAPAGLIFLOZIN 10 MG/1
1 TABLET, FILM COATED ORAL DAILY
Qty: 90 TABLET | Refills: 1 | Status: SHIPPED | OUTPATIENT
Start: 2025-05-09 | End: 2025-11-05

## 2025-05-14 DIAGNOSIS — G47.00 INSOMNIA, UNSPECIFIED TYPE: ICD-10-CM

## 2025-05-15 RX ORDER — TRAZODONE HYDROCHLORIDE 100 MG/1
100 TABLET ORAL
Qty: 90 TABLET | Refills: 0 | Status: SHIPPED | OUTPATIENT
Start: 2025-05-15

## 2025-05-25 DIAGNOSIS — E78.2 MIXED HYPERLIPIDEMIA: ICD-10-CM

## 2025-05-27 RX ORDER — ATORVASTATIN CALCIUM 20 MG/1
20 TABLET, FILM COATED ORAL DAILY
Qty: 90 TABLET | Refills: 1 | Status: SHIPPED | OUTPATIENT
Start: 2025-05-27

## 2025-06-21 DIAGNOSIS — I10 ESSENTIAL (PRIMARY) HYPERTENSION: ICD-10-CM

## 2025-06-23 RX ORDER — CLONIDINE HYDROCHLORIDE 0.2 MG/1
0.1 TABLET ORAL EVERY 12 HOURS
Qty: 90 TABLET | Refills: 0 | Status: SHIPPED | OUTPATIENT
Start: 2025-06-23

## 2025-06-27 DIAGNOSIS — I10 ESSENTIAL HYPERTENSION: ICD-10-CM

## 2025-06-27 RX ORDER — LISINOPRIL 40 MG/1
40 TABLET ORAL DAILY
Qty: 90 TABLET | Refills: 0 | Status: SHIPPED | OUTPATIENT
Start: 2025-06-27

## 2025-07-03 ENCOUNTER — TELEPHONE (OUTPATIENT)
Dept: DIABETES SERVICES | Facility: HOSPITAL | Age: 70
End: 2025-07-03
Payer: MEDICARE

## 2025-07-03 DIAGNOSIS — E11.65 UNCONTROLLED TYPE 2 DIABETES MELLITUS WITH HYPERGLYCEMIA: ICD-10-CM

## 2025-07-03 RX ORDER — CALCIUM CITRATE/VITAMIN D3 200MG-6.25
TABLET ORAL
Qty: 100 EACH | Refills: 12 | Status: SHIPPED | OUTPATIENT
Start: 2025-07-03

## 2025-07-03 NOTE — TELEPHONE ENCOUNTER
Hub staff attempted to follow warm transfer process and was unsuccessful     Caller: [facility] [address] [place] [address] [id number] [facility] [phone] [facility] [phone] FX    Relationship to patient: Pharmacy      Patient is needing: INSURANCE ONLY COVERS [employer] NEED SENT TO PHARMACY WITH PA ASAP

## 2025-07-05 DIAGNOSIS — I10 ESSENTIAL (PRIMARY) HYPERTENSION: ICD-10-CM

## 2025-07-07 NOTE — TELEPHONE ENCOUNTER
Rx Refill Note  Requested Prescriptions     Pending Prescriptions Disp Refills    amLODIPine (NORVASC) 10 MG tablet [Pharmacy Med Name: amLODIPine Besylate 10 MG Oral Tablet] 90 tablet 0     Sig: Take 1 tablet by mouth once daily    pantoprazole (PROTONIX) 40 MG EC tablet [Pharmacy Med Name: Pantoprazole Sodium 40 MG Oral Tablet Delayed Release] 90 tablet 0     Sig: Take 1 tablet by mouth once daily      Last office visit with prescribing clinician: 4/8/2025   Last telemedicine visit with prescribing clinician: Visit date not found   Next office visit with prescribing clinician: Visit date not found  Calcium-Channel Blockers Protocol Failed     Fabiola Sousa LPN  07/07/25, 11:25 EDT

## 2025-07-09 RX ORDER — AMLODIPINE BESYLATE 10 MG/1
10 TABLET ORAL DAILY
Qty: 90 TABLET | Refills: 0 | Status: SHIPPED | OUTPATIENT
Start: 2025-07-09

## 2025-07-09 RX ORDER — PANTOPRAZOLE SODIUM 40 MG/1
40 TABLET, DELAYED RELEASE ORAL DAILY
Qty: 90 TABLET | Refills: 0 | Status: SHIPPED | OUTPATIENT
Start: 2025-07-09